# Patient Record
Sex: FEMALE | Race: BLACK OR AFRICAN AMERICAN | NOT HISPANIC OR LATINO | Employment: OTHER | URBAN - METROPOLITAN AREA
[De-identification: names, ages, dates, MRNs, and addresses within clinical notes are randomized per-mention and may not be internally consistent; named-entity substitution may affect disease eponyms.]

---

## 2018-10-26 ENCOUNTER — HOSPITAL ENCOUNTER (EMERGENCY)
Facility: HOSPITAL | Age: 33
Discharge: HOME | End: 2018-10-26
Attending: EMERGENCY MEDICINE | Admitting: EMERGENCY MEDICINE

## 2018-10-26 VITALS
BODY MASS INDEX: 36.7 KG/M2 | RESPIRATION RATE: 20 BRPM | TEMPERATURE: 98.4 F | SYSTOLIC BLOOD PRESSURE: 122 MMHG | WEIGHT: 215 LBS | OXYGEN SATURATION: 99 % | DIASTOLIC BLOOD PRESSURE: 68 MMHG | HEIGHT: 64 IN | HEART RATE: 103 BPM

## 2018-10-26 DIAGNOSIS — R22.0 MANDIBULAR SWELLING: ICD-10-CM

## 2018-10-26 DIAGNOSIS — K08.89 DENTALGIA: Primary | ICD-10-CM

## 2018-10-26 PROCEDURE — 99282 EMERGENCY DEPT VISIT SF MDM: CPT

## 2018-10-26 RX ORDER — CHLORHEXIDINE GLUCONATE 0.12 MG/ML
15 RINSE ORAL 2 TIMES DAILY
Qty: 120 ML | Refills: 0 | Status: SHIPPED | OUTPATIENT
Start: 2018-10-26

## 2018-10-26 RX ORDER — CLINDAMYCIN HYDROCHLORIDE 150 MG/1
450 CAPSULE ORAL EVERY 8 HOURS SCHEDULED
Qty: 63 CAPSULE | Refills: 0 | Status: SHIPPED | OUTPATIENT
Start: 2018-10-26 | End: 2018-11-02

## 2018-10-26 RX ORDER — LIDOCAINE HYDROCHLORIDE 10 MG/ML
5 INJECTION, SOLUTION EPIDURAL; INFILTRATION; INTRACAUDAL; PERINEURAL ONCE
Status: COMPLETED | OUTPATIENT
Start: 2018-10-26 | End: 2018-10-26

## 2018-10-26 RX ORDER — CLINDAMYCIN HYDROCHLORIDE 150 MG/1
450 CAPSULE ORAL ONCE
Status: COMPLETED | OUTPATIENT
Start: 2018-10-26 | End: 2018-10-26

## 2018-10-26 RX ADMIN — CLINDAMYCIN HYDROCHLORIDE 450 MG: 150 CAPSULE ORAL at 21:08

## 2018-10-26 RX ADMIN — LIDOCAINE HYDROCHLORIDE 5 ML: 10 INJECTION, SOLUTION EPIDURAL; INFILTRATION; INTRACAUDAL; PERINEURAL at 21:08

## 2018-10-27 NOTE — ED ATTENDING ATTESTATION
Roberta Castañeda MD, saw and evaluated the patient  I have discussed the patient with the resident/non-physician practitioner and agree with the resident's/non-physician practitioner's findings, Plan of Care, and MDM as documented in the resident's/non-physician practitioner's note, except where noted  All available labs and Radiology studies were reviewed  At this point I agree with the current assessment done in the Emergency Department  I have conducted an independent evaluation of this patient a history and physical is as follows:      Critical Care Time  CritCare Time    Procedures     34 yo female with left mandibular pain and drainage with hx of poor dentition and cracked crown  Pt with no trauma  Pt taking ibuprofen with no relief  No trouble swallowing or breathing, no fever  Vss, afebrile, lungs cta, rrr, left mandibular lower tooth tender, no trismus    Dentalgia, pain meds, block, abx, dental referral

## 2018-10-27 NOTE — ED PROVIDER NOTES
History  Chief Complaint   Patient presents with    Oral Swelling     pt reports pain and swelling to left lower gum area since last night  denies resp involvement  pt took 800mg motrin around 68pm     35year-old woman presents for evaluation of oral swelling  She describes a 2-day history of progressive left mandibular swelling and associated pain surrounding tooth #20  She has a known cracked crown and has not seen a dentist  No inciting trauma  No fevers, chills, dysphagia, or dyspnea  On arrival, patient is afebrile with a heart rate of 103 and otherwise normal vital signs  Physical exam shows gingival and soft-tissue edema in the area of tooth #20 without obvious abscess  No submental edema  Soft sublingual space  No trismus  Will provide dental block and first dose of antibiotics for suspected dental infection  Will prescribe Peridex, a course of antibiotics, and have the patient follow up with dentistry for further management  None       No past medical history on file  No past surgical history on file  No family history on file  I have reviewed and agree with the history as documented  Social History   Substance Use Topics    Smoking status: Not on file    Smokeless tobacco: Not on file    Alcohol use Not on file        Review of Systems   Constitutional: Negative for chills and fever  HENT: Positive for dental problem and facial swelling  Negative for rhinorrhea, sore throat and trouble swallowing  Eyes: Negative for photophobia and visual disturbance  Respiratory: Negative for cough and shortness of breath  Cardiovascular: Negative for chest pain and leg swelling  Gastrointestinal: Negative for abdominal pain, diarrhea, nausea and vomiting  Genitourinary: Negative for dysuria, frequency and hematuria  Musculoskeletal: Negative for back pain, neck pain and neck stiffness  Skin: Negative for rash and wound     Neurological: Negative for light-headedness and headaches  Physical Exam  ED Triage Vitals [10/26/18 2033]   Temperature Pulse Respirations Blood Pressure SpO2   98 4 °F (36 9 °C) 103 20 122/68 99 %      Temp Source Heart Rate Source Patient Position - Orthostatic VS BP Location FiO2 (%)   Oral Monitor Sitting Right arm --      Pain Score       8           Orthostatic Vital Signs  Vitals:    10/26/18 2033   BP: 122/68   Pulse: 103   Patient Position - Orthostatic VS: Sitting       Physical Exam   Constitutional: She is oriented to person, place, and time  She appears well-developed and well-nourished  No distress  HENT:   Head: Normocephalic and atraumatic    gingival and soft-tissue edema in the area of tooth #20 without obvious abscess  No submental edema  Soft sublingual space  No trismus   Eyes: Pupils are equal, round, and reactive to light  Conjunctivae are normal  No scleral icterus  Neck: Neck supple  No JVD present  No signs of meningismus   Cardiovascular: Normal rate, regular rhythm and normal heart sounds  Exam reveals no gallop and no friction rub  No murmur heard  Pulmonary/Chest: Effort normal and breath sounds normal  No respiratory distress  She has no wheezes  She has no rales  Abdominal: Soft  She exhibits no distension  There is no tenderness  There is no rebound and no guarding  Musculoskeletal: She exhibits no edema or tenderness  Neurological: She is alert and oriented to person, place, and time  Skin: Skin is warm and dry  She is not diaphoretic  Psychiatric: She has a normal mood and affect  Her behavior is normal  Thought content normal    Vitals reviewed        ED Medications  Medications   clindamycin (CLEOCIN) capsule 450 mg (450 mg Oral Given 10/26/18 2108)   lidocaine (PF) (XYLOCAINE-MPF) 1 % injection 5 mL (5 mL Infiltration Given by Other 10/26/18 2108)       Diagnostic Studies  Results Reviewed     None                 No orders to display         Procedures  Procedures      Phone Consults  ED Phone Contact    ED Course                               MDM  Number of Diagnoses or Management Options  Dentalgia:   Mandibular swelling:   Diagnosis management comments: No signs of systemic infection  No submental edema  Dental block was performed  Patient was given the first dose of clindamycin and discharged with Peridex, clindamycin, and outpatient dental follow up  CritCare Time    Disposition  Final diagnoses:   Dentalgia   Mandibular swelling     Time reflects when diagnosis was documented in both MDM as applicable and the Disposition within this note     Time User Action Codes Description Comment    10/26/2018  9:16 PM Charlette Lopez [K08 [id number]] Dentalgia     10/26/2018  9:16 PM Enma Lopez Add [[id number]] Mandibular swelling       ED Disposition     ED Disposition Condition Comment    Discharge  Marleny Odom discharge to home/self care  Condition at discharge: Good        Follow-up Information     Follow up With Specialties Details Why Contact Info Additional [address]  Schedule an appointment as soon as possible for a visit  [address]  [address] Emergency Department Emergency Medicine  As needed [address] [facility] ED, [address] [place], [place], [facility]          Discharge Medication List as of 10/26/2018  9:18 PM      START taking these medications    Details   chlorhexidine (PERIDEX) 0 12 % solution Apply 15 mL to the mouth or throat 2 (two) times a day, Starting Fri 10/26/2018, Print      clindamycin (CLEOCIN) 150 mg capsule Take 3 capsules (450 mg total) by mouth every 8 (eight) hours for 7 days, Starting Fri 10/26/2018, Until Fri 11/2/2018, Print           No discharge procedures on file  ED Provider  Attending physically available and evaluated Marleny Odom   I managed the patient along with the ED Attending      Electronically Signed by         Jesus Gooden MD  10/27/18 4690

## 2018-10-27 NOTE — DISCHARGE INSTRUCTIONS
Please follow up with our dental clinic, as soon as possible, for further management  You can take Tylenol and ibuprofen, as needed, for pain  Take the antibiotics and use the Peridex I prescribed you for a likely dental infection  Return to the ER for new or worrisome symptoms  Toothache   WHAT YOU NEED TO KNOW:   A toothache is pain that is caused by irritation of the nerves in the center of your tooth  The irritation may be caused by several problems, such as a cavity, an infection, a cracked tooth, or gum disease  It is very important to follow up with your dentist so the cause of your toothache can be diagnosed and treated  This can help prevent more serious problems  DISCHARGE INSTRUCTIONS:   Medicines: You may  need any of the following:  · NSAIDs  decrease swelling and pain  This medicine can be bought with or without a doctor's order  This medicine can cause stomach bleeding or kidney problems in certain people  If you take blood thinner medicine, always ask your healthcare provider if NSAIDs are safe for you  Always read the medicine label and follow the directions on it before using this medicine  · Acetaminophen  decreases pain  It is available without a doctor's order  Ask how much to take and how often to take it  Follow directions  Acetaminophen can cause liver damage if not taken correctly  · Pain medicine  may be given as a pill or as medicine that you put directly on your tooth or gums  Do not wait until the pain is severe before you take this medicine  · Antibiotics  help fight or prevent an infection caused by bacteria  Take them as directed  · Take your medicine as directed  Contact your healthcare provider if you think your medicine is not helping or if you have side effects  Tell him of her if you are allergic to any medicine  Keep a list of the medicines, vitamins, and herbs you take  Include the amounts, and when and why you take them   Bring the list or the pill bottles to follow-up visits  Carry your medicine list with you in case of an emergency  Follow up with your dentist as directed: You may be referred to a dental surgeon  Write down your questions so you remember to ask them during your visits  Self-care:   · Rinse your mouth with warm salt water 4 times a day or as directed  · You may need to eat soft foods to help relieve pain caused by chewing  Contact your dentist if:   · You have questions or concerns about your condition or care  Return to the emergency department if:   · You have trouble breathing  · You have swelling in your face or neck  · You have a fever and chills  · You have trouble speaking or swallowing  · You have trouble opening or closing your mouth  © 2017 [facility] Information is for End User's use only and may not be sold, redistributed or otherwise used for commercial purposes  All illustrations and images included in CareNotes® are the copyrighted property of [employer]  or Matthew Savage  The above information is an  only  It is not intended as medical advice for individual conditions or treatments  Talk to your doctor, nurse or pharmacist before following any medical regimen to see if it is safe and effective for you  Chlorhexidine (Into the mouth)   Chlorhexidine (klor-HEX-i-mundo)  Treats gingivitis and periodontitis (teeth and gum disease)  Brand Name(s): Periochip   There may be other brand names for this medicine  When This Medicine Should Not Be Used:   Do not use this medicine if you have had an allergic reaction to chlorhexidine  How to Use This Medicine:   Chip, Liquid  · Your dentist will tell you how much of this medicine to use and how often  Do not use more medicine or use it longer or more often than your dentist tells you to  · Chip: Your dentist will place the chip between your gums and teeth where the gum has a deep pocket   He will place the chip after your teeth have been thoroughly cleaned  Your dentist will check the depth of the pockets in your gums every 3 months to see if they need to be treated again  · Liquid: This medicine should be used after you have brushed and flossed your teeth  Measure out 1/2 fluid ounce (15 milliliters) as marked in the cap that comes with the bottle  Swish the solution in your mouth for at least 30 seconds and then spit it out  Do not swallow it  Do not mix it with water or other fluids  Do not eat or drink right after you use this medicine  If a dose is missed:   · Take a dose as soon as you remember  If it is almost time for your next dose, wait until then and take a regular dose  Do not take extra medicine to make up for a missed dose  How to Store and Dispose of This Medicine:   · Store the medicine in a closed container at room temperature, away from heat, moisture, and direct light  · Ask your pharmacist, doctor, or health caregiver about the best way to dispose of any outdated medicine or medicine no longer needed  · Keep all medicine out of the reach of children  Never share your medicine with anyone  Drugs and Foods to Avoid:      Ask your doctor or pharmacist before using any other medicine, including over-the-counter medicines, vitamins, and herbal products  Warnings While Using This Medicine:   · Make sure your dentist knows if you are pregnant or breastfeeding, or if you have cancer, diabetes, a weak immune system, or other gum problems (such as abscess or pus)  · This medicine may discolor your teeth a yellow-brown color  This is normal and can be removed with professional cleaning  · Check with your dentist right away if a dental chip becomes loose or falls out  Chlorhexidine implants are small, orange-brown rectangular chips that are rounded at one end  · Do not floss around the teeth and gums that have been treated for 10 days after the dental chips have been inserted   Flossing may push out the dental chips  · Mild sensitivity is normal for about 1 week after the chip was inserted  Check with your dentist right away if you feel pain or swelling where the chip was inserted  Possible Side Effects While Using This Medicine: If you notice these less serious side effects, talk with your doctor:  · Allergic reaction: Itching or hives, swelling in your face or hands, swelling or tingling in your mouth or throat, chest tightness, trouble breathing  · Bad taste in your mouth or altered sense of taste  · Stained teeth, tooth fillings, and dentures  · Toothache  If you notice other side effects that you think are caused by this medicine, tell your doctor  Call your doctor for medical advice about side effects  You may report side effects to FDA at 2-860-FDA-3909  © 2017 [address] [employer] Information is for End User's use only and may not be sold, redistributed or otherwise used for commercial purposes  The above information is an  only  It is not intended as medical advice for individual conditions or treatments  Talk to your doctor, nurse or pharmacist before following any medical regimen to see if it is safe and effective for you  Clindamycin (By mouth)   Clindamycin (kaqd-fx-BUI-sin)  Treats infections  Brand Name(s): Cleocin, Cleocin HCl, Cleocin Pediatric   There may be other brand names for this medicine  When This Medicine Should Not Be Used: This medicine is not right for everyone  Do not use it if you had an allergic reaction to clindamycin or lincomycin  How to Use This Medicine:   Capsule, Liquid  · Your doctor will tell you how much medicine to use  Do not use more than directed  · Capsule: Swallow with a full glass of water  · Oral liquid: Measure the oral liquid medicine with a marked measuring spoon, oral syringe, or medicine cup    · Take all of the medicine in your prescription to clear up your infection, even if you feel better after the first few doses   · Missed dose: Take a dose as soon as you remember  If it is almost time for your next dose, wait until then and take a regular dose  Do not take extra medicine to make up for a missed dose  · Store the medicine in a closed container at room temperature, away from heat, moisture, and direct light  Oral liquid: Do not refrigerate or freeze  Throw away any unused medicine after 14 days  Drugs and Foods to Avoid:   Ask your doctor or pharmacist before using any other medicine, including over-the-counter medicines, vitamins, and herbal products  · Some medicines can affect how clindamycin works  Tell your doctor if you are using erythromycin  Warnings While Using This Medicine:   · Tell your doctor if you are pregnant or breastfeeding, or if you have kidney disease, liver disease, allergies (including an allergy to aspirin), asthma, or stomach or bowel problems (including colitis)  · This medicine may cause severe skin reactions  · This medicine can cause diarrhea  Call your doctor if the diarrhea becomes severe, does not stop, or is bloody  Do not take any medicine to stop diarrhea until you have talked to your doctor  Diarrhea can occur 2 months or more after you stop taking this medicine  · Keep all medicine out of the reach of children  Never share your medicine with anyone    Possible Side Effects While Using This Medicine:   Call your doctor right away if you notice any of these side effects:  · Allergic reaction: Itching or hives, swelling in your face or hands, swelling or tingling in your mouth or throat, chest tightness, trouble breathing  · Blistering, peeling, red skin rash  · Fever, chills, cough, sore throat, body aches  · Severe diarrhea that does not go away, stomach cramps  · Unusual bleeding, bruising, or weakness  If you notice these less serious side effects, talk with your doctor:   · Mild diarrhea, nausea  If you notice other side effects that you think are caused by this medicine, tell your doctor  Call your doctor for medical advice about side effects  You may report side effects to FDA at 4-836-FDA-1034  © 2017 [address] [employer] Information is for End User's use only and may not be sold, redistributed or otherwise used for commercial purposes  The above information is an  only  It is not intended as medical advice for individual conditions or treatments  Talk to your doctor, nurse or pharmacist before following any medical regimen to see if it is safe and effective for you

## 2021-01-12 ENCOUNTER — TELEPHONE (OUTPATIENT)
Dept: FAMILY MEDICINE CLINIC | Facility: CLINIC | Age: 36
End: 2021-01-12

## 2024-06-28 ENCOUNTER — HOSPITAL ENCOUNTER (INPATIENT)
Facility: HOSPITAL | Age: 39
LOS: 3 days | Discharge: HOME | DRG: 812 | End: 2024-07-01
Attending: EMERGENCY MEDICINE | Admitting: INTERNAL MEDICINE
Payer: COMMERCIAL

## 2024-06-28 DIAGNOSIS — D64.9 ANEMIA: Primary | ICD-10-CM

## 2024-06-28 DIAGNOSIS — D72.819 LEUKOPENIA: ICD-10-CM

## 2024-06-28 LAB
ABO GROUP BLD: NORMAL
ABO GROUP BLD: NORMAL
ALBUMIN SERPL BCG-MCNC: 4 G/DL (ref 3.5–5)
ALP SERPL-CCNC: 30 U/L (ref 34–104)
ALT SERPL W P-5'-P-CCNC: 5 U/L (ref 7–52)
ANION GAP SERPL CALCULATED.3IONS-SCNC: 6 MMOL/L (ref 4–13)
ANISOCYTOSIS BLD QL SMEAR: PRESENT
AST SERPL W P-5'-P-CCNC: 12 U/L (ref 13–39)
BACTERIA UR QL AUTO: ABNORMAL /HPF
BASOPHILS # BLD AUTO: 0.06 THOUSANDS/ÂΜL (ref 0–0.1)
BASOPHILS # BLD AUTO: 0.25 THOUSAND/UL (ref 0–0.1)
BASOPHILS NFR BLD AUTO: 2 % (ref 0–1)
BASOPHILS NFR MAR MANUAL: 6 % (ref 0–1)
BILIRUB DIRECT SERPL-MCNC: 0.08 MG/DL (ref 0–0.2)
BILIRUB SERPL-MCNC: 0.31 MG/DL (ref 0.2–1)
BILIRUB UR QL STRIP: NEGATIVE
BLD GP AB SCN SERPL QL: NEGATIVE
BUN SERPL-MCNC: 8 MG/DL (ref 5–25)
CALCIUM SERPL-MCNC: 8.5 MG/DL (ref 8.4–10.2)
CHLORIDE SERPL-SCNC: 107 MMOL/L (ref 96–108)
CLARITY UR: CLEAR
CO2 SERPL-SCNC: 26 MMOL/L (ref 21–32)
COLOR UR: ABNORMAL
CREAT SERPL-MCNC: 0.49 MG/DL (ref 0.6–1.3)
DACRYOCYTES BLD QL SMEAR: PRESENT
DACRYOCYTES BLD QL SMEAR: PRESENT
EOSINOPHIL # BLD AUTO: 0.04 THOUSAND/ÂΜL (ref 0–0.61)
EOSINOPHIL NFR BLD AUTO: 1 % (ref 0–6)
ERYTHROCYTE [DISTWIDTH] IN BLOOD BY AUTOMATED COUNT: 27.8 % (ref 11.6–15.1)
FERRITIN SERPL-MCNC: 2 NG/ML (ref 11–307)
FOLATE SERPL-MCNC: 7.2 NG/ML
GFR SERPL CREATININE-BSD FRML MDRD: 123 ML/MIN/1.73SQ M
GLUCOSE SERPL-MCNC: 76 MG/DL (ref 65–140)
GLUCOSE UR STRIP-MCNC: NEGATIVE MG/DL
HCT VFR BLD AUTO: 14.6 % (ref 34.8–46.1)
HCT VFR BLD AUTO: 16.3 % (ref 34.8–46.1)
HELMET CELLS BLD QL SMEAR: PRESENT
HELMET CELLS BLD QL SMEAR: PRESENT
HGB BLD-MCNC: 3.7 G/DL (ref 11.5–15.4)
HGB BLD-MCNC: 3.9 G/DL (ref 11.5–15.4)
HGB UR QL STRIP.AUTO: ABNORMAL
HYPERCHROMIA BLD QL SMEAR: PRESENT
HYPERCHROMIA BLD QL SMEAR: PRESENT
IMM GRANULOCYTES # BLD AUTO: 0.04 THOUSAND/UL (ref 0–0.2)
IMM GRANULOCYTES NFR BLD AUTO: 1 % (ref 0–2)
IRON SATN MFR SERPL: 2 % (ref 15–50)
IRON SERPL-MCNC: 11 UG/DL (ref 50–212)
KETONES UR STRIP-MCNC: NEGATIVE MG/DL
LDH SERPL-CCNC: 186 U/L (ref 140–271)
LEUKOCYTE ESTERASE UR QL STRIP: NEGATIVE
LG PLATELETS BLD QL SMEAR: PRESENT
LG PLATELETS BLD QL SMEAR: PRESENT
LYMPHOCYTES # BLD AUTO: 1.41 THOUSANDS/ÂΜL (ref 0.6–4.47)
LYMPHOCYTES # BLD AUTO: 1.44 THOUSAND/UL (ref 0.6–4.47)
LYMPHOCYTES # BLD AUTO: 32 %
LYMPHOCYTES NFR BLD AUTO: 36 % (ref 14–44)
MCH RBC QN AUTO: 13.9 PG (ref 26.8–34.3)
MCHC RBC AUTO-ENTMCNC: 23.9 G/DL (ref 31.4–37.4)
MCV RBC AUTO: 58 FL (ref 82–98)
MICROCYTES BLD QL AUTO: PRESENT
MICROCYTES BLD QL AUTO: PRESENT
MONOCYTES # BLD AUTO: 0.08 THOUSAND/UL (ref 0–1.22)
MONOCYTES # BLD AUTO: 0.33 THOUSAND/ÂΜL (ref 0.17–1.22)
MONOCYTES NFR BLD AUTO: 2 % (ref 4–12)
MONOCYTES NFR BLD AUTO: 8 % (ref 4–12)
MUCOUS THREADS UR QL AUTO: ABNORMAL
NEUTROPHILS # BLD AUTO: 2.07 THOUSANDS/ÂΜL (ref 1.85–7.62)
NEUTS SEG # BLD: 2.34 THOUSAND/UL (ref 1.81–6.82)
NEUTS SEG NFR BLD AUTO: 52 % (ref 43–75)
NEUTS SEG NFR BLD AUTO: 57 %
NITRITE UR QL STRIP: NEGATIVE
NON-SQ EPI CELLS URNS QL MICRO: ABNORMAL /HPF
NRBC BLD AUTO-RTO: 0 /100 WBCS
OVALOCYTES BLD QL SMEAR: PRESENT
PH UR STRIP.AUTO: 7 [PH]
PLATELET # BLD AUTO: 372 THOUSANDS/UL (ref 149–390)
PLATELET BLD QL SMEAR: ABNORMAL
PLATELET BLD QL SMEAR: ADEQUATE
PLATELET CLUMP BLD QL SMEAR: PRESENT
PLATELET CLUMP BLD QL SMEAR: PRESENT
POIKILOCYTOSIS BLD QL SMEAR: PRESENT
POIKILOCYTOSIS BLD QL SMEAR: PRESENT
POLYCHROMASIA BLD QL SMEAR: PRESENT
POTASSIUM SERPL-SCNC: 3.6 MMOL/L (ref 3.5–5.3)
PROT SERPL-MCNC: 6.6 G/DL (ref 6.4–8.4)
PROT UR STRIP-MCNC: ABNORMAL MG/DL
RBC # BLD AUTO: 2.81 MILLION/UL (ref 3.81–5.12)
RBC #/AREA URNS AUTO: ABNORMAL /HPF
RBC MORPH BLD: PRESENT
RBC MORPH BLD: PRESENT
RH BLD: POSITIVE
RH BLD: POSITIVE
SODIUM SERPL-SCNC: 139 MMOL/L (ref 135–147)
SP GR UR STRIP.AUTO: 1.01 (ref 1–1.03)
SPECIMEN EXPIRATION DATE: NORMAL
TARGETS BLD QL SMEAR: PRESENT
TARGETS BLD QL SMEAR: PRESENT
TIBC SERPL-MCNC: 691 UG/DL (ref 250–450)
TOTAL CELLS COUNTED SPEC: 100
UIBC SERPL-MCNC: 680 UG/DL (ref 155–355)
UROBILINOGEN UR STRIP-ACNC: <2 MG/DL
VARIANT LYMPHS # BLD AUTO: 3 % (ref 0–0)
VIT B12 SERPL-MCNC: 207 PG/ML (ref 180–914)
WBC # BLD AUTO: 3.95 THOUSAND/UL (ref 4.31–10.16)
WBC #/AREA URNS AUTO: ABNORMAL /HPF

## 2024-06-28 PROCEDURE — 86900 BLOOD TYPING SEROLOGIC ABO: CPT

## 2024-06-28 PROCEDURE — 30233N1 TRANSFUSION OF NONAUTOLOGOUS RED BLOOD CELLS INTO PERIPHERAL VEIN, PERCUTANEOUS APPROACH: ICD-10-PCS | Performed by: HOSPITALIST

## 2024-06-28 PROCEDURE — 99291 CRITICAL CARE FIRST HOUR: CPT | Performed by: EMERGENCY MEDICINE

## 2024-06-28 PROCEDURE — 82728 ASSAY OF FERRITIN: CPT | Performed by: INTERNAL MEDICINE

## 2024-06-28 PROCEDURE — 85018 HEMOGLOBIN: CPT

## 2024-06-28 PROCEDURE — 86901 BLOOD TYPING SEROLOGIC RH(D): CPT

## 2024-06-28 PROCEDURE — 85007 BL SMEAR W/DIFF WBC COUNT: CPT | Performed by: INTERNAL MEDICINE

## 2024-06-28 PROCEDURE — 86850 RBC ANTIBODY SCREEN: CPT

## 2024-06-28 PROCEDURE — 36430 TRANSFUSION BLD/BLD COMPNT: CPT

## 2024-06-28 PROCEDURE — 83540 ASSAY OF IRON: CPT | Performed by: INTERNAL MEDICINE

## 2024-06-28 PROCEDURE — 86923 COMPATIBILITY TEST ELECTRIC: CPT

## 2024-06-28 PROCEDURE — 85025 COMPLETE CBC W/AUTO DIFF WBC: CPT

## 2024-06-28 PROCEDURE — 80048 BASIC METABOLIC PNL TOTAL CA: CPT

## 2024-06-28 PROCEDURE — 83550 IRON BINDING TEST: CPT | Performed by: INTERNAL MEDICINE

## 2024-06-28 PROCEDURE — 83615 LACTATE (LD) (LDH) ENZYME: CPT | Performed by: INTERNAL MEDICINE

## 2024-06-28 PROCEDURE — 82607 VITAMIN B-12: CPT | Performed by: INTERNAL MEDICINE

## 2024-06-28 PROCEDURE — 80076 HEPATIC FUNCTION PANEL: CPT | Performed by: INTERNAL MEDICINE

## 2024-06-28 PROCEDURE — 81001 URINALYSIS AUTO W/SCOPE: CPT | Performed by: INTERNAL MEDICINE

## 2024-06-28 PROCEDURE — 82746 ASSAY OF FOLIC ACID SERUM: CPT | Performed by: INTERNAL MEDICINE

## 2024-06-28 PROCEDURE — P9016 RBC LEUKOCYTES REDUCED: HCPCS

## 2024-06-28 PROCEDURE — 99283 EMERGENCY DEPT VISIT LOW MDM: CPT

## 2024-06-28 PROCEDURE — 85014 HEMATOCRIT: CPT

## 2024-06-28 PROCEDURE — 99222 1ST HOSP IP/OBS MODERATE 55: CPT | Performed by: INTERNAL MEDICINE

## 2024-06-28 PROCEDURE — 36415 COLL VENOUS BLD VENIPUNCTURE: CPT

## 2024-06-28 RX ORDER — FOLIC ACID 1 MG/1
1 TABLET ORAL DAILY
Status: DISCONTINUED | OUTPATIENT
Start: 2024-06-29 | End: 2024-07-01 | Stop reason: HOSPADM

## 2024-06-28 RX ORDER — CYANOCOBALAMIN 1000 UG/ML
1000 INJECTION, SOLUTION INTRAMUSCULAR; SUBCUTANEOUS DAILY
Status: DISCONTINUED | OUTPATIENT
Start: 2024-06-29 | End: 2024-07-01 | Stop reason: HOSPADM

## 2024-06-28 NOTE — ED PROVIDER NOTES
"History  Chief Complaint   Patient presents with    Abnormal Lab     Pt states she was called to come in for low RBCs.  Pt states she had it done yesterday at the clinic     Patient is a 39-year-old female no past medical history presenting to the ED for evaluation of abnormal outpatient lab work.  Reports that she had blood work done as part of her process for getting her 's license.  She was called today and told to go to the hospital because \"my RBCs were low\".  She denies any exertional dyspnea chest pain lightheadedness syncope heavy vaginal bleeding or bleeding from other sources no abdominal pain nausea vomiting or any other complaints.          Prior to Admission Medications   Prescriptions Last Dose Informant Patient Reported? Taking?   chlorhexidine (PERIDEX) 0.12 % solution   No No   Sig: Apply 15 mL to the mouth or throat 2 (two) times a day   fexofenadine-pseudoephedrine (ALLEGRA-D 24) 180-240 MG per 24 hr tablet   Yes No   Sig: Take 1 tablet by mouth daily      Facility-Administered Medications: None       History reviewed. No pertinent past medical history.    History reviewed. No pertinent surgical history.    History reviewed. No pertinent family history.  I have reviewed and agree with the history as documented.    E-Cigarette/Vaping    E-Cigarette Use Never User      E-Cigarette/Vaping Substances     Social History     Tobacco Use    Smoking status: Never    Smokeless tobacco: Never   Vaping Use    Vaping status: Never Used   Substance Use Topics    Alcohol use: Yes     Comment: occasional    Drug use: Never        Review of Systems   Constitutional:  Negative for chills and fever.   HENT:  Negative for ear pain and sore throat.    Eyes:  Negative for pain and visual disturbance.   Respiratory:  Negative for cough and shortness of breath.    Cardiovascular:  Negative for chest pain and palpitations.   Gastrointestinal:  Negative for abdominal pain and vomiting.   Genitourinary:  Negative for " dysuria and hematuria.   Musculoskeletal:  Negative for arthralgias and back pain.   Skin:  Negative for color change and rash.   Neurological:  Negative for seizures and syncope.   All other systems reviewed and are negative.      Physical Exam  ED Triage Vitals [06/28/24 1120]   Temperature Pulse Respirations Blood Pressure SpO2   98 °F (36.7 °C) 77 18 162/70 99 %      Temp Source Heart Rate Source Patient Position - Orthostatic VS BP Location FiO2 (%)   Tympanic Monitor Sitting Right arm --      Pain Score       No Pain             Orthostatic Vital Signs  Vitals:    06/28/24 1120 06/28/24 1230 06/28/24 1430   BP: 162/70 146/67 157/67   Pulse: 77 72 74   Patient Position - Orthostatic VS: Sitting Lying        Physical Exam  Vitals and nursing note reviewed.   Constitutional:       General: She is not in acute distress.     Appearance: She is well-developed. She is not ill-appearing or diaphoretic.   HENT:      Head: Normocephalic and atraumatic.      Mouth/Throat:      Mouth: Mucous membranes are moist.   Eyes:      Extraocular Movements: Extraocular movements intact.      Conjunctiva/sclera: Conjunctivae normal.      Comments: Pale sclera   Cardiovascular:      Rate and Rhythm: Normal rate and regular rhythm.      Heart sounds: Murmur heard.      Gallop present.   Pulmonary:      Effort: Pulmonary effort is normal. No respiratory distress.      Breath sounds: Normal breath sounds.   Abdominal:      Palpations: Abdomen is soft.      Tenderness: There is no abdominal tenderness.   Musculoskeletal:         General: Normal range of motion.      Cervical back: Neck supple.   Skin:     General: Skin is warm and dry.      Coloration: Skin is pale (palms, nails, oral mucosa).   Neurological:      General: No focal deficit present.      Mental Status: She is alert.         ED Medications  Medications - No data to display    Diagnostic Studies  Results Reviewed       Procedure Component Value Units Date/Time    Hemoglobin  and hematocrit, blood [[id number]]  (Abnormal) Collected: 06/28/24 1459    Lab Status: Final result Specimen: Blood from Arm, Left Updated: 06/28/24 1541     Hemoglobin 3.7 g/dL      Hematocrit 14.6 %     CBC and differential [[id number]]  (Abnormal) Collected: 06/28/24 1339    Lab Status: Final result Specimen: Blood from Arm, Left Updated: 06/28/24 1446     WBC 3.95 Thousand/uL      RBC 2.81 Million/uL      Hemoglobin 3.9 g/dL      Hematocrit 16.3 %      MCV 58 fL      MCH 13.9 pg      MCHC 23.9 g/dL      RDW 27.8 %      Platelets 372 Thousands/uL      nRBC 0 /100 WBCs      Segmented % 52 %      Immature Grans % 1 %      Lymphocytes % 36 %      Monocytes % 8 %      Eosinophils Relative 1 %      Basophils Relative 2 %      Absolute Neutrophils 2.07 Thousands/µL      Absolute Immature Grans 0.04 Thousand/uL      Absolute Lymphocytes 1.41 Thousands/µL      Absolute Monocytes 0.33 Thousand/µL      Eosinophils Absolute 0.04 Thousand/µL      Basophils Absolute 0.06 Thousands/µL     Narrative:      This is an appended report.  These results have been appended to a previously verified report.    Smear Review(Phlebs Do Not Order) [[id number]]  (Abnormal) Collected: 06/28/24 1339    Lab Status: Final result Specimen: Blood from Arm, Left Updated: 06/28/24 1446     RBC Morphology Present     Platelet Estimate Unable to Estimate due to clumped platelets     Clumped Platelets Present     Large Platelet Present     Anisocytosis Present     Helmet Cells Present     Hypochromia Present     Microcytes Present     Poikilocytes Present     Target Cells Present     Tear Drop Cells Present    Basic metabolic panel [[id number]]  (Abnormal) Collected: 06/28/24 1339    Lab Status: Final result Specimen: Blood from Arm, Left Updated: 06/28/24 1413     Sodium 139 mmol/L      Potassium 3.6 mmol/L      Chloride 107 mmol/L      CO2 26 mmol/L      ANION GAP 6 mmol/L      BUN 8 mg/dL      Creatinine 0.49 mg/dL      Glucose 76 mg/dL      Calcium 8.5  mg/dL      eGFR 123 ml/min/1.73sq m     Narrative:      National [employer] guidelines for Chronic Kidney Disease (CKD):     Stage 1 with normal or high GFR (GFR > 90 mL/min/1.73 square meters)    Stage 2 Mild CKD (GFR = 60-89 mL/min/1.73 square meters)    Stage 3A Moderate CKD (GFR = 45-59 mL/min/1.73 square meters)    Stage 3B Moderate CKD (GFR = 30-44 mL/min/1.73 square meters)    Stage 4 Severe CKD (GFR = 15-29 mL/min/1.73 square meters)    Stage 5 End Stage CKD (GFR <15 mL/min/1.73 square meters)  Note: GFR calculation is accurate only with a steady state creatinine                   No orders to display         Procedures  Procedures      ED Course  ED Course as of 06/28/24 1605   Fri Jun 28, 2024   1455 Hemoglobin(!!): 3.9  Will send repeat h&h to confirm validity as pt is completely asymptomatic                              SBIRT 22yo+      Flowsheet Row Most Recent Value   Initial Alcohol Screen: US AUDIT-C     1. How often do you have a drink containing alcohol? 0 Filed at: 06/28/2024 1122   2. How many drinks containing alcohol do you have on a typical day you are drinking?  0 Filed at: 06/28/2024 1122   3a. Male UNDER 65: How often do you have five or more drinks on one occasion? 0 Filed at: 06/28/2024 1122   3b. FEMALE Any Age, or MALE 65+: How often do you have 4 or more drinks on one occassion? 0 Filed at: 06/28/2024 1122   Audit-C Score 0 Filed at: 06/28/2024 1122   ELVER: How many times in the past year have you...    Used an illegal drug or used a prescription medication for non-medical reasons? Never Filed at: 06/28/2024 1122                  Medical Decision Making  Patient is a 39-year-old female presenting for evaluation of abnormal outpatient lab work    Told low RBCs likely meant hemoglobin.  Will check labs, transfuse as needed    Initial hemoglobin 3.9, verified by repeat H&H 3.7.  There is also leukopenia.  Platelets normal.  Will need to be admitted for further workup as to  etiology.  In the meantime consent obtained for transfusion will give 2 units PRBC    Discussed with medicine who accepts patient for admission.  Patient hemodynamically stable and asymptomatic at the time of admission    Amount and/or Complexity of Data Reviewed  Labs: ordered. Decision-making details documented in ED Course.    Risk  Decision regarding hospitalization.          Disposition  Final diagnoses:   Anemia   Leukopenia     Time reflects when diagnosis was documented in both MDM as applicable and the Disposition within this note       Time User Action Codes Description Comment    6/28/2024  4:05 PM Naeem Lopez [D64.9] Anemia     6/28/2024  4:05 PM Naeem Lopez [D72.819] Leukopenia           ED Disposition       ED Disposition   Admit    Condition   Stable    Date/Time   Fri Jun 28, 2024 1605    Comment                  Follow-up Information    None         Patient's Medications   Discharge Prescriptions    No medications on file     No discharge procedures on file.    PDMP Review       None             ED Provider  Attending physically available and evaluated Tatiana MODE Quesadaon. I managed the patient along with the ED Attending.    Electronically Signed by           Naeem Lopez DO  06/29/24 0714

## 2024-06-28 NOTE — H&P
[facility]  H&P  Name: Tatiana Lopez 39 y.o. female I MRN: [id number]  Unit/Bed#: ED 08 I Date of Admission: 6/28/2024   Date of Service: 6/28/2024 I Hospital Day: 0    Iron studies consistent with severe iron deficiency anemia.  Also notable for B12 deficiency.  Will start IV Venofer and B12 supplements     Assessment & Plan   * Anemia  Assessment & Plan  Reports having routine outpatient blood work and was sent for low hemoglobin level  Hemoglobin presentation 3.7.  No prior history of anemia.  She does report intermittent heavy menstrual periods.  Last menstrual period started last Friday and ended today.  Denies hematuria, hematemesis, rectal bleeding  Check iron studies, folic acid, B12,, T bili, LDH and haptoglobin  Will be transfused 2 RBC units they will recheck afterward  Given concomitant leukopenia.  Will ask for hematology evaluation.  Check peripheral smear  Check occult stool           VTE Pharmacologic Prophylaxis:     Code Status: Level 1 - Full Code   Discussion with family: Patient declined call to .     Anticipated Length of Stay: Patient will be admitted on an inpatient basis with an anticipated length of stay of greater than 2 midnights secondary to above.    Total Time Spent on Date of Encounter in care of patient: 45 mins. This time was spent on one or more of the following: performing physical exam; counseling and coordination of care; obtaining or reviewing history; documenting in the medical record; reviewing/ordering tests, medications or procedures; communicating with other healthcare professionals and discussing with patient's family/caregivers.    Chief Complaint: low Hgb    History of Present Illness:  Tatiana Lopez is a 39 y.o. female with no significant past medical history.  She reports history of learning stability.  She had outpatient routine blood work which showed low hemoglobin level so she was sent to ED for further  evaluation.  Hemoglobin presentation 3.7 with associated mild leukopenia.  Patient reports intermittent episodes of heavy menstrual periods with last episode started last Friday and ending today.  Otherwise denies any other signs of bleeding.  She has no complaints today.  She is being transfused 2 units of blood and admitted for further evaluation.    Review of Systems:  Review of Systems   Constitutional:  Negative for chills and fever.   HENT:  Negative for ear pain and sore throat.    Eyes:  Negative for pain and visual disturbance.   Respiratory:  Negative for cough and shortness of breath.    Cardiovascular:  Negative for chest pain and palpitations.   Gastrointestinal:  Negative for abdominal pain and vomiting.   Genitourinary:  Negative for dysuria and hematuria.   Musculoskeletal:  Negative for arthralgias and back pain.   Skin:  Negative for color change and rash.   Neurological:  Negative for seizures and syncope.   All other systems reviewed and are negative.      Past Medical and Surgical History:   History reviewed. No pertinent past medical history.    History reviewed. No pertinent surgical history.    Meds/Allergies:  Prior to Admission medications    Medication Sig Start Date End Date Taking? Authorizing Provider   chlorhexidine (PERIDEX) 0.12 % solution Apply 15 mL to the mouth or throat 2 (two) times a day 10/26/18   Ibrahima Lopez MD   fexofenadine-pseudoephedrine (ALLEGRA-D 24) 180-240 MG per 24 hr tablet Take 1 tablet by mouth daily    Historical Provider, MD     I have reviewed home medications with patient personally.    Allergies:   Allergies   Allergen Reactions    Ceclor [Cefaclor]        Social History:  Marital Status: Single     Social History     Substance and Sexual Activity   Alcohol Use Yes    Comment: occasional     Social History     Tobacco Use   Smoking Status Never   Smokeless Tobacco Never     Social History     Substance and Sexual Activity   Drug Use Never  "      Family History:  History reviewed. No pertinent family history.    Physical Exam:     Vitals:   Blood Pressure: 163/71 (06/28/24 1657)  Pulse: 80 (06/28/24 1657)  Temperature: 98.9 °F (37.2 °C) (06/28/24 1657)  Temp Source: Tympanic (06/28/24 1657)  Respirations: 18 (06/28/24 1657)  SpO2: 99 % (06/28/24 1657)    Physical Exam  Vitals and nursing note reviewed.   Constitutional:       General: She is not in acute distress.     Appearance: She is well-developed.   HENT:      Head: Normocephalic and atraumatic.   Eyes:      Conjunctiva/sclera: Conjunctivae normal.   Cardiovascular:      Rate and Rhythm: Normal rate and regular rhythm.      Heart sounds: No murmur heard.  Pulmonary:      Effort: Pulmonary effort is normal. No respiratory distress.      Breath sounds: Normal breath sounds.   Abdominal:      Palpations: Abdomen is soft.      Tenderness: There is no abdominal tenderness.   Musculoskeletal:         General: No swelling.      Cervical back: Neck supple.   Skin:     General: Skin is warm and dry.      Capillary Refill: Capillary refill takes less than 2 seconds.   Neurological:      Mental Status: She is alert.   Psychiatric:         Mood and Affect: Mood normal.          Additional Data:     Lab Results:  Results from last 7 days   Lab Units 06/28/24  1459 06/28/24  1339   WBC Thousand/uL  --  3.95*   HEMOGLOBIN g/dL 3.7* 3.9*   HEMATOCRIT % 14.6* 16.3*   PLATELETS Thousands/uL  --  372   SEGS PCT %  --  52   LYMPHO PCT % 32 36   MONO PCT % 2* 8   EOS PCT %  --  1     Results from last 7 days   Lab Units 06/28/24  1339   SODIUM mmol/L 139   POTASSIUM mmol/L 3.6   CHLORIDE mmol/L 107   CO2 mmol/L 26   BUN mg/dL 8   CREATININE mg/dL 0.49*   ANION GAP mmol/L 6   CALCIUM mg/dL 8.5   ALBUMIN g/dL 4.0   TOTAL BILIRUBIN mg/dL 0.31   ALK PHOS U/L 30*   ALT U/L 5*   AST U/L 12*   GLUCOSE RANDOM mg/dL 76             No results found for: \"HGBA1C\"        Lines/Drains:  Invasive Devices       Peripheral " Intravenous Line  Duration             Peripheral IV 06/28/24 Distal;Left;Ventral (anterior) Forearm <1 day                        Imaging: Reviewed radiology reports from this admission including: xray(s)  No orders to display       EKG and Other Studies Reviewed on Admission:   EKG: Personally Reviewed.    ** Please Note: This note has been constructed using a voice recognition system. **

## 2024-06-28 NOTE — ASSESSMENT & PLAN NOTE
Reports having routine outpatient blood work and was sent for low hemoglobin level  Hemoglobin presentation 3.7.  No prior history of anemia.  She does report intermittent heavy menstrual periods.  Last menstrual period started last Friday and ended today.  Denies hematuria, hematemesis, rectal bleeding  Check iron studies, folic acid, B12,, T bili, LDH and haptoglobin  Will be transfused 2 RBC units they will recheck afterward  Given concomitant leukopenia.  Will ask for hematology evaluation.  Check peripheral smear

## 2024-06-28 NOTE — ED PROCEDURE NOTE
PROCEDURE  CriticalCare Time    Date/Time: 6/28/2024 3:45 PM    Performed by: Mitchell Rivas DO  Authorized by: Mitchell Rivas DO    Critical care provider statement:     Critical care time (minutes):  60    Critical care time was exclusive of:  Separately billable procedures and treating other patients and teaching time    Critical care was time spent personally by me on the following activities:  Blood draw for specimens, obtaining history from patient or surrogate, development of treatment plan with patient or surrogate, evaluation of patient's response to treatment, examination of patient, ordering and performing treatments and interventions, ordering and review of laboratory studies, ordering and review of radiographic studies, re-evaluation of patient's condition and review of old charts  Comments:      Upon my evaluation, this patient has a high probability of imminent or life-threatening deterioration due to anemia requiring blood transfusion  which required my direct attention, intervention, and personal management.     I have personally provided 60 minutes of critical care time, exclusive of procedures, teaching, and any prior time recorded by providers other than myself. Time includes review of laboratory data, radiology results, discussion with consultants, and monitoring for potential decompensation.            Mitchell Rivas DO  06/28/24 1546

## 2024-06-28 NOTE — ED ATTENDING ATTESTATION
6/28/2024  I, Mitchell Rivas DO, saw and evaluated the patient. I have discussed the patient with the resident/non-physician practitioner and agree with the resident's/non-physician practitioner's findings, Plan of Care, and MDM as documented in the resident's/non-physician practitioner's note, except where noted. All available labs and Radiology studies were reviewed.  I was present for key portions of any procedure(s) performed by the resident/non-physician practitioner and I was immediately available to provide assistance.       At this point I agree with the current assessment done in the Emergency Department.  I have conducted an independent evaluation of this patient a history and physical is as follows:    39F with routine blood work, low rbcs noted. No symptoms noted. Unknown lab value on chart review.     ROS: per resident physician note    Gen: NAD, AA&Ox3  HEENT: PERRL, EOMI  Neck: supple  CV: RRR, murmur noted, systolic.   Lungs: CTA B/L  Abdomen: soft, NT/ND  Ext: no swelling or deformity  Neuro: 5/5 strength all extremities, sensation grossly intact  Skin: no rash    Ddx- anemia, lab error,     Will check cbc.     ED Course  ED Course as of 06/28/24 1546   Fri Jun 28, 2024   1543 Hemoglobin(!!): 3.7   1543 Hemoglobin(!!): 3.9  Hemoglobin with severe anemia noted.  Will transfuse and admit.         Critical Care Time  Procedures

## 2024-06-28 NOTE — ASSESSMENT & PLAN NOTE
Reports having routine outpatient blood work and was sent for low hemoglobin level  Hemoglobin presentation 3.7.  No prior history of anemia.  She does report intermittent heavy menstrual periods.  Last menstrual period started last Friday and ended this morning, denies passing any clots.  Denies hematuria, hematemesis, rectal bleeding.  Iron studies remarkable for iron deficiency anemia.  Total bili and LDH within normal limit, haptoglobin pending but low suspicious for any underlying hemolysis.  Patient is s/p 3 unit transfusion of PRBC with improvement in hemoglobin to 6.4 this morning, will order 1 more dose of PRBC.  She will also be started on IV iron on Monday.   Hematology consulted due to concurrent leukopenia.  Patient will need referral for GYN on discharge.

## 2024-06-29 LAB
ABO GROUP BLD BPU: NORMAL
ANION GAP SERPL CALCULATED.3IONS-SCNC: 8 MMOL/L (ref 4–13)
BPU ID: NORMAL
BUN SERPL-MCNC: 8 MG/DL (ref 5–25)
CALCIUM SERPL-MCNC: 8.5 MG/DL (ref 8.4–10.2)
CHLORIDE SERPL-SCNC: 107 MMOL/L (ref 96–108)
CO2 SERPL-SCNC: 25 MMOL/L (ref 21–32)
CREAT SERPL-MCNC: 0.47 MG/DL (ref 0.6–1.3)
CROSSMATCH: NORMAL
ERYTHROCYTE [DISTWIDTH] IN BLOOD BY AUTOMATED COUNT: 30.9 % (ref 11.6–15.1)
GFR SERPL CREATININE-BSD FRML MDRD: 124 ML/MIN/1.73SQ M
GLUCOSE SERPL-MCNC: 68 MG/DL (ref 65–140)
HCT VFR BLD AUTO: 23.1 % (ref 34.8–46.1)
HGB BLD-MCNC: 5.7 G/DL (ref 11.5–15.4)
HGB BLD-MCNC: 6.4 G/DL (ref 11.5–15.4)
MCH RBC QN AUTO: 18.3 PG (ref 26.8–34.3)
MCHC RBC AUTO-ENTMCNC: 27.7 G/DL (ref 31.4–37.4)
MCV RBC AUTO: 66 FL (ref 82–98)
PLATELET # BLD AUTO: 392 THOUSANDS/UL (ref 149–390)
POTASSIUM SERPL-SCNC: 3.6 MMOL/L (ref 3.5–5.3)
RBC # BLD AUTO: 3.5 MILLION/UL (ref 3.81–5.12)
SODIUM SERPL-SCNC: 140 MMOL/L (ref 135–147)
TSH SERPL DL<=0.05 MIU/L-ACNC: 3.21 UIU/ML (ref 0.45–4.5)
UNIT DISPENSE STATUS: NORMAL
UNIT PRODUCT CODE: NORMAL
UNIT PRODUCT VOLUME: 350 ML
UNIT RH: NORMAL
WBC # BLD AUTO: 5.08 THOUSAND/UL (ref 4.31–10.16)

## 2024-06-29 PROCEDURE — 83918 ORGANIC ACIDS TOTAL QUANT: CPT | Performed by: INTERNAL MEDICINE

## 2024-06-29 PROCEDURE — 84443 ASSAY THYROID STIM HORMONE: CPT | Performed by: INTERNAL MEDICINE

## 2024-06-29 PROCEDURE — 85018 HEMOGLOBIN: CPT | Performed by: INTERNAL MEDICINE

## 2024-06-29 PROCEDURE — 83010 ASSAY OF HAPTOGLOBIN QUANT: CPT | Performed by: INTERNAL MEDICINE

## 2024-06-29 PROCEDURE — 85027 COMPLETE CBC AUTOMATED: CPT | Performed by: INTERNAL MEDICINE

## 2024-06-29 PROCEDURE — 80048 BASIC METABOLIC PNL TOTAL CA: CPT | Performed by: INTERNAL MEDICINE

## 2024-06-29 PROCEDURE — P9040 RBC LEUKOREDUCED IRRADIATED: HCPCS

## 2024-06-29 PROCEDURE — 99231 SBSQ HOSP IP/OBS SF/LOW 25: CPT | Performed by: STUDENT IN AN ORGANIZED HEALTH CARE EDUCATION/TRAINING PROGRAM

## 2024-06-29 PROCEDURE — P9016 RBC LEUKOCYTES REDUCED: HCPCS

## 2024-06-29 PROCEDURE — 99222 1ST HOSP IP/OBS MODERATE 55: CPT | Performed by: INTERNAL MEDICINE

## 2024-06-29 RX ADMIN — FOLIC ACID 1 MG: 1 TABLET ORAL at 08:58

## 2024-06-29 RX ADMIN — CYANOCOBALAMIN 1000 MCG: 1000 INJECTION, SOLUTION INTRAMUSCULAR at 08:58

## 2024-06-29 RX ADMIN — IRON SUCROSE 200 MG: 20 INJECTION, SOLUTION INTRAVENOUS at 08:58

## 2024-06-29 NOTE — PLAN OF CARE
Problem: PAIN - ADULT  Goal: Verbalizes/displays adequate comfort level or baseline comfort level  Description: Interventions:  - Encourage patient to monitor pain and request assistance  - Assess pain using appropriate pain scale  - Administer analgesics based on type and severity of pain and evaluate response  - Implement non-pharmacological measures as appropriate and evaluate response  - Consider cultural and social influences on pain and pain management  - Notify physician/advanced practitioner if interventions unsuccessful or patient reports new pain  Outcome: Progressing     Problem: INFECTION - ADULT  Goal: Absence or prevention of progression during hospitalization  Description: INTERVENTIONS:  - Assess and monitor for signs and symptoms of infection  - Monitor lab/diagnostic results  - Monitor all insertion sites, i.e. indwelling lines, tubes, and drains  - Monitor endotracheal if appropriate and nasal secretions for changes in amount and color  - Price appropriate cooling/warming therapies per order  - Administer medications as ordered  - Instruct and encourage patient and family to use good hand hygiene technique  - Identify and instruct in appropriate isolation precautions for identified infection/condition  Outcome: Progressing     Problem: SAFETY ADULT  Goal: Maintain or return to baseline ADL function  Description: INTERVENTIONS:  -  Assess patient's ability to carry out ADLs; assess patient's baseline for ADL function and identify physical deficits which impact ability to perform ADLs (bathing, care of mouth/teeth, toileting, grooming, dressing, etc.)  - Assess/evaluate cause of self-care deficits   - Assess range of motion  - Assess patient's mobility; develop plan if impaired  - Assess patient's need for assistive devices and provide as appropriate  - Encourage maximum independence but intervene and supervise when necessary  - Involve family in performance of ADLs  - Assess for home care  needs following discharge   - Consider OT consult to assist with ADL evaluation and planning for discharge  - Provide patient education as appropriate  Outcome: Progressing     Problem: CARDIOVASCULAR - ADULT  Goal: Maintains optimal cardiac output and hemodynamic stability  Description: INTERVENTIONS:  - Monitor I/O, vital signs and rhythm  - Monitor for S/S and trends of decreased cardiac output  - Administer and titrate ordered vasoactive medications to optimize hemodynamic stability  - Assess quality of pulses, skin color and temperature  - Assess for signs of decreased coronary artery perfusion  - Instruct patient to report change in severity of symptoms  Outcome: Progressing     Problem: RESPIRATORY - ADULT  Goal: Achieves optimal ventilation and oxygenation  Description: INTERVENTIONS:  - Assess for changes in respiratory status  - Assess for changes in mentation and behavior  - Position to facilitate oxygenation and minimize respiratory effort  - Oxygen administered by appropriate delivery if ordered  - Initiate smoking cessation education as indicated  - Encourage broncho-pulmonary hygiene including cough, deep breathe, Incentive Spirometry  - Assess the need for suctioning and aspirate as needed  - Assess and instruct to report SOB or any respiratory difficulty  - Respiratory Therapy support as indicated  Outcome: Progressing     Problem: HEMATOLOGIC - ADULT  Goal: Maintains hematologic stability  Description: INTERVENTIONS  - Assess for signs and symptoms of bleeding or hemorrhage  - Monitor labs  - Administer supportive blood products/factors as ordered and appropriate  Outcome: Progressing

## 2024-06-29 NOTE — CONSULTS
Hematology/Oncology Consult Note      [facility]ATOLOG[facility] [place]  [address] [facility] [id number] [phone]    Date of Service: 6/29/2024     Admitting Diagnosis: Leukopenia [D72.819]  Anemia [D64.9]  Encounter to discuss test results [Z71.2]    Reason for Consultation: Microcytic anemia/EDUAR    Referral Physician: No ref. provider found    Oncology/Hematology History:  6/28/2024: Hgb 3.7, MCV 66, ferritin 2, TIBC 691, iron 11, Fe Sat 2%  6/29/2024: WBC 5k, Hgb 6.4, plt 392k    Assessment and Recommendations:   Acute microcytic anemia in the setting of heavy menstrual period blood loss and established EDUAR based off of 6/28 labs  Pt is s/p 4U PRBC. Recommend IV Venofer 300mg Monday   Please initiate PO Iron today to be taken Monday, Wednesday, Friday  Outpatient heme f/u to assess if further IV Iron needed on a recurring basis  Outpatient gyn referral to address options to reduce her bleeding and need for future parenteral iron    Above discussed with the primary team    David Payne MD         Thank you very much for your consultation and making us part of this nice patient's care. I will continue to follow closely with you. Please contact me with any additional questions.    Disclaimer: This document was prepared using [employer] Direct technology. If a word or phrase is confusing, or does not make sense, this is likely due to recognition error which was not discovered during the providers review. If you believe an error has occurred, please contact me through HemOn service line for apple?cation.    HPI:   Tatiana Lopez is a 39 y.o. female admitted by Rui Hemphill MD with heavy menstrual periods since she was a teenager. No other acute issues. Feeling better since her 34th PRBC.    Review of system:  10-point review of system was performed, pertinent positive and negative were detailed as above    History reviewed. No  pertinent past medical history.    History reviewed. No pertinent surgical history.    History reviewed. No pertinent family history.    Social History     Socioeconomic History    Marital status: Single     Spouse name: Not on file    Number of children: Not on file    Years of education: Not on file    Highest education level: Not on file   Occupational History    Not on file   Tobacco Use    Smoking status: Never    Smokeless tobacco: Never   Vaping Use    Vaping status: Never Used   Substance and Sexual Activity    Alcohol use: Yes     Comment: occasional    Drug use: Never    Sexual activity: Not on file   Other Topics Concern    Not on file   Social History Narrative    Not on file     Social Determinants of Health     Financial Resource Strain: Not on file   Food Insecurity: Not on file   Transportation Needs: Not on file   Physical Activity: Not on file   Stress: Not on file   Social Connections: Not on file   Intimate Partner Violence: Not on file   Housing Stability: Not on file       Allergies   Allergen Reactions    Ceclor [Cefaclor]        Current Facility-Administered Medications   Medication Dose Route Frequency Provider Last Rate Last Admin    cyanocobalamin injection 1,000 mcg  1,000 mcg Intramuscular Daily Rui Hemphill MD   1,000 mcg at 06/29/24 0858    folic acid (FOLVITE) tablet 1 mg  1 mg Oral Daily Rui Hemphill MD   1 mg at 06/29/24 0858    iron sucrose (VENOFER) 200 mg in sodium chloride 0.9 % 100 mL IVPB  200 mg Intravenous Daily Rui Hemphill  mL/hr at 06/29/24 0858 200 mg at 06/29/24 0858       Medications Prior to Admission   Medication Sig Dispense Refill Last Dose    chlorhexidine (PERIDEX) 0.12 % solution Apply 15 mL to the mouth or throat 2 (two) times a day (Patient not taking: Reported on 6/28/2024) 120 mL 0 Not Taking    fexofenadine-pseudoephedrine (ALLEGRA-D 24) 180-240 MG per 24 hr tablet Take 1 tablet by mouth daily (Patient not taking: Reported on 6/28/2024)   Not  "Taking       Objective:     24 Hour Vitals Assessment:     BP-Systolic (24hrs), Av , Min:124 , Max:172   BP-Diastolic (24hrs), Av, Min:67, Max:85  BP  Min: 124/70  Max: 172/74  Temp  Av.6 °F (37 °C)  Min: 97.8 °F (36.6 °C)  Max: 99.4 °F (37.4 °C)  Pulse  Av.3  Min: 68  Max: 80  Resp  Av.6  Min: 16  Max: 18  SpO2  Av.4 %  Min: 96 %  Max: 100 %    PHYSICIAN EXAM:      Physical exam:  General:  Appears in no distress, sitting up in bed  Neuro:  Speaks in full sentences, no focal deficits noted  Pulmonary:  No cyanosis, no accessory muscle use  Cardiovascular: Regular rate, no abnormal rhythm noted  GI:  Appears nondistended, no masses noted  Extremities:  No new rash noted, no cyanosis  Psychiatry:  Normal mood with congruent affect  Ear nose and throat:  Atraumatic, extraocular muscles intact      Data Review:    Image Study:      LABS:  CBC:  Recent Labs     24  1339 24  0546   WBC 3.95* 5.08   MCV 58* 66*   MCH 13.9* 18.3*   MCHC 23.9* 27.7*   RDW 27.8* 30.9*     CMP:   Recent Labs     24  1339 24  0546   SODIUM 139 140   BUN 8 8   CALCIUM 8.5 8.5   CREATININE 0.49* 0.47*       MISC. LABS:    Recent Labs     24  1339        LFT:   Recent Labs     24  1339   AST 12*       Coags:  Invalid input(s): \"COAGPROFILE\"  No results for input(s): \"INR\", \"PTT\" in the last 72 hours.    Invalid input(s): \"PTP\", \"FIBRINOGENQN\"    By:  David Payne MD, 2024, 12:13 PM                                  Primary Care Physician:  No primary care provider on file.       "

## 2024-06-29 NOTE — PLAN OF CARE
Problem: PAIN - ADULT  Goal: Verbalizes/displays adequate comfort level or baseline comfort level  Description: Interventions:  - Encourage patient to monitor pain and request assistance  - Assess pain using appropriate pain scale  - Administer analgesics based on type and severity of pain and evaluate response  - Implement non-pharmacological measures as appropriate and evaluate response  - Consider cultural and social influences on pain and pain management  - Notify physician/advanced practitioner if interventions unsuccessful or patient reports new pain  Outcome: Progressing     Problem: INFECTION - ADULT  Goal: Absence or prevention of progression during hospitalization  Description: INTERVENTIONS:  - Assess and monitor for signs and symptoms of infection  - Monitor lab/diagnostic results  - Monitor all insertion sites, i.e. indwelling lines, tubes, and drains  - Monitor endotracheal if appropriate and nasal secretions for changes in amount and color  - [place] appropriate cooling/warming therapies per order  - Administer medications as ordered  - Instruct and encourage patient and family to use good hand hygiene technique  - Identify and instruct in appropriate isolation precautions for identified infection/condition  Outcome: Progressing  Goal: Absence of fever/infection during neutropenic period  Description: INTERVENTIONS:  - Monitor WBC    Outcome: Progressing     Problem: SAFETY ADULT  Goal: Patient will remain free of falls  Description: INTERVENTIONS:  - Educate patient/family on patient safety including physical limitations  - Instruct patient to call for assistance with activity   - Consult OT/PT to assist with strengthening/mobility   - Keep Call bell within reach  - Keep bed low and locked with side rails adjusted as appropriate  - Keep care items and personal belongings within reach  - Initiate and maintain comfort rounds  - Make Fall Risk Sign visible to staff  - Offer Toileting every 2 Hours,  in advance of need  - Initiate/Maintain bed alarm  - Obtain necessary fall risk management equipment  - Apply yellow socks and bracelet for high fall risk patients  - Consider moving patient to room near nurses station  Outcome: Progressing  Goal: Maintain or return to baseline ADL function  Description: INTERVENTIONS:  -  Assess patient's ability to carry out ADLs; assess patient's baseline for ADL function and identify physical deficits which impact ability to perform ADLs (bathing, care of mouth/teeth, toileting, grooming, dressing, etc.)  - Assess/evaluate cause of self-care deficits   - Assess range of motion  - Assess patient's mobility; develop plan if impaired  - Assess patient's need for assistive devices and provide as appropriate  - Encourage maximum independence but intervene and supervise when necessary  - Involve family in performance of ADLs  - Assess for home care needs following discharge   - Consider OT consult to assist with ADL evaluation and planning for discharge  - Provide patient education as appropriate  Outcome: Progressing  Goal: Maintains/Returns to pre admission functional level  Description: INTERVENTIONS:  - Perform AM-PAC 6 Click Basic Mobility/ Daily Activity assessment daily.  - Set and communicate daily mobility goal to care team and patient/family/caregiver.   - Collaborate with rehabilitation services on mobility goals if consulted  - Ambulate patient 3 times a day  - Out of bed to chair 3 times a day   - Out of bed for meals 3 times a day  - Out of bed for toileting  - Record patient progress and toleration of activity level   Outcome: Progressing     Problem: DISCHARGE PLANNING  Goal: Discharge to home or other facility with appropriate resources  Description: INTERVENTIONS:  - Identify barriers to discharge w/patient and caregiver  - Arrange for needed discharge resources and transportation as appropriate  - Identify discharge learning needs (meds, wound care, etc.)  - Arrange  for interpretive services to assist at discharge as needed  - Refer to Case Management Department for coordinating discharge planning if the patient needs post-hospital services based on physician/advanced practitioner order or complex needs related to functional status, cognitive ability, or social support system  Outcome: Progressing     Problem: Knowledge Deficit  Goal: Patient/family/caregiver demonstrates understanding of disease process, treatment plan, medications, and discharge instructions  Description: Complete learning assessment and assess knowledge base.  Interventions:  - Provide teaching at level of understanding  - Provide teaching via preferred learning methods  Outcome: Progressing     Problem: CARDIOVASCULAR - ADULT  Goal: Maintains optimal cardiac output and hemodynamic stability  Description: INTERVENTIONS:  - Monitor I/O, vital signs and rhythm  - Monitor for S/S and trends of decreased cardiac output  - Administer and titrate ordered vasoactive medications to optimize hemodynamic stability  - Assess quality of pulses, skin color and temperature  - Assess for signs of decreased coronary artery perfusion  - Instruct patient to report change in severity of symptoms  Outcome: Progressing  Goal: Absence of cardiac dysrhythmias or at baseline rhythm  Description: INTERVENTIONS:  - Continuous cardiac monitoring, vital signs, obtain 12 lead EKG if ordered  - Administer antiarrhythmic and heart rate control medications as ordered  - Monitor electrolytes and administer replacement therapy as ordered  Outcome: Progressing     Problem: RESPIRATORY - ADULT  Goal: Achieves optimal ventilation and oxygenation  Description: INTERVENTIONS:  - Assess for changes in respiratory status  - Assess for changes in mentation and behavior  - Position to facilitate oxygenation and minimize respiratory effort  - Oxygen administered by appropriate delivery if ordered  - Initiate smoking cessation education as indicated  -  Encourage broncho-pulmonary hygiene including cough, deep breathe, Incentive Spirometry  - Assess the need for suctioning and aspirate as needed  - Assess and instruct to report SOB or any respiratory difficulty  - Respiratory Therapy support as indicated  Outcome: Progressing     Problem: HEMATOLOGIC - ADULT  Goal: Maintains hematologic stability  Description: INTERVENTIONS  - Assess for signs and symptoms of bleeding or hemorrhage  - Monitor labs  - Administer supportive blood products/factors as ordered and appropriate  Outcome: Progressing

## 2024-06-29 NOTE — PROGRESS NOTES
[facility]  Progress Note  Name: Tatiana Lopez I  MRN: [id number]  Unit/Bed#: [facility] 905-01 I Date of Admission: 6/28/2024   Date of Service: 6/29/2024 I Hospital Day: 1    Assessment & Plan   * Anemia  Assessment & Plan  Reports having routine outpatient blood work and was sent for low hemoglobin level  Hemoglobin presentation 3.7.  No prior history of anemia.  She does report intermittent heavy menstrual periods.  Last menstrual period started last Friday and ended this morning, denies passing any clots.  Denies hematuria, hematemesis, rectal bleeding.  Iron studies remarkable for iron deficiency anemia.  Total bili and LDH within normal limit, haptoglobin pending but low suspicious for any underlying hemolysis.  Patient is s/p 3 unit transfusion of PRBC with improvement in hemoglobin to 6.4 this morning, will order 1 more dose of PRBC.  She will also be started on IV iron on Monday.   Hematology consulted due to concurrent leukopenia.  Patient will need referral for GYN on discharge.               VTE Pharmacologic Prophylaxis:   Moderate Risk (Score 3-4) - Pharmacological DVT Prophylaxis Contraindicated. Sequential Compression Devices Ordered.    Mobility:   Basic Mobility Inpatient Raw Score: 18  JH-HLM Goal: 6: Walk 10 steps or more  JH-HLM Achieved: 7: Walk 25 feet or more  JH-HLM Goal achieved. Continue to encourage appropriate mobility.    Patient Centered Rounds: I performed bedside rounds with nursing staff today.   Discussions with Specialists or Other Care Team Provider: Hematology.    Education and Discussions with Family / Patient:  Pt updated at bedside.     Total Time Spent on Date of Encounter in care of patient: 27 mins. This time was spent on one or more of the following: performing physical exam; counseling and coordination of care; obtaining or reviewing history; documenting in the medical record; reviewing/ordering tests, medications or procedures;  communicating with other healthcare professionals and discussing with patient's family/caregivers.    Current Length of Stay: 1 day(s)  Current Patient Status: Inpatient   Certification Statement: The patient will continue to require additional inpatient hospital stay due to ongoing management for anemia requiring transfusions and IV iron.  Discharge Plan: Anticipate discharge in 48 hrs to home.    Code Status: Level 1 - Full Code    Subjective:   Patient seen at bedside, denies any bleeding in urine or stool.  She reports her menstrual cycle ended this morning.  Denies passing any clots.    Objective:     Vitals:   Temp (24hrs), Av.7 °F (37.1 °C), Min:97.8 °F (36.6 °C), Max:99.8 °F (37.7 °C)    Temp:  [97.8 °F (36.6 °C)-99.8 °F (37.7 °C)] 99.8 °F (37.7 °C)  HR:  [68-80] 75  Resp:  [16-18] 18  BP: (124-172)/(67-85) 147/79  SpO2:  [96 %-100 %] 96 %  There is no height or weight on file to calculate BMI.     Input and Output Summary (last 24 hours):     Intake/Output Summary (Last 24 hours) at 2024 1605  Last data filed at 2024 1419  Gross per 24 hour   Intake 1818.94 ml   Output 825 ml   Net 993.94 ml       Physical Exam:   Physical Exam  Constitutional:       Appearance: Normal appearance.   HENT:      Head: Normocephalic and atraumatic.   Cardiovascular:      Rate and Rhythm: Normal rate and regular rhythm.   Pulmonary:      Effort: Pulmonary effort is normal.      Breath sounds: Normal breath sounds.   Abdominal:      General: Bowel sounds are normal.      Palpations: Abdomen is soft.   Skin:     General: Skin is warm.   Neurological:      Mental Status: She is alert and oriented to person, place, and time.          Additional Data:     Labs:  Results from last 7 days   Lab Units 24  0546 24  0002 24  1459 24  1339   WBC Thousand/uL 5.08  --   --  3.95*   HEMOGLOBIN g/dL 6.4*   < > 3.7* 3.9*   HEMATOCRIT % 23.1*  --  14.6* 16.3*   PLATELETS Thousands/uL 392*  --   --  372    SEGS PCT %  --   --   --  52   LYMPHO PCT %  --   --  32 36   MONO PCT %  --   --  2* 8   EOS PCT %  --   --   --  1    < > = values in this interval not displayed.     Results from last 7 days   Lab Units 06/29/24  0546 06/28/24  1339   SODIUM mmol/L 140 139   POTASSIUM mmol/L 3.6 3.6   CHLORIDE mmol/L 107 107   CO2 mmol/L 25 26   BUN mg/dL 8 8   CREATININE mg/dL 0.47* 0.49*   ANION GAP mmol/L 8 6   CALCIUM mg/dL 8.5 8.5   ALBUMIN g/dL  --  4.0   TOTAL BILIRUBIN mg/dL  --  0.31   ALK PHOS U/L  --  30*   ALT U/L  --  5*   AST U/L  --  12*   GLUCOSE RANDOM mg/dL 68 76                       Lines/Drains:  Invasive Devices       Peripheral Intravenous Line  Duration             Peripheral IV 06/28/24 Distal;Left;Ventral (anterior) Forearm 1 day                          Imaging: No pertinent imaging reviewed.    Recent Cultures (last 7 days):         Last 24 Hours Medication List:   Current Facility-Administered Medications   Medication Dose Route Frequency Provider Last Rate    cyanocobalamin  1,000 mcg Intramuscular Daily Rui Hemphill MD      folic acid  1 mg Oral Daily Rui Hemphill MD      iron sucrose  200 mg Intravenous Daily Rui Hemphill  mg (06/29/24 0858)        Today, Patient Was Seen By: Geno Guillen MD    **Please Note: This note may have been constructed using a voice recognition system.**

## 2024-06-29 NOTE — CASE MANAGEMENT
Case Management Assessment & Discharge Planning Note    Patient name Tatiana Lopez  Location [facility] 905/[facility]-01 MRN [id number]  : 1985 Date 2024       Current Admission Date: 2024  Current Admission Diagnosis:Anemia   Patient Active Problem List    Diagnosis Date Noted Date Diagnosed    Anemia 2024       LOS (days): 1  Geometric Mean LOS (GMLOS) (days):   Days to GMLOS:     OBJECTIVE:    Risk of Unplanned Readmission Score: 5.74         Current admission status: Inpatient       Preferred Pharmacy:   UNKNOWN - FOLLOW UP PRIOR TO DISCHARGE TO E-PRESCRIBE  No address on file      Primary Care Provider: No primary care provider on file.    Primary Insurance: MEDICARE  Secondary Insurance: HU[employer]    ASSESSMENT:  Active Health Care Proxies    There are no active Health Care Proxies on file.         Patient Information  Admitted from:: Home  Mental Status: Alert  During Assessment patient was accompanied by: Not accompanied during assessment  Assessment information provided by:: Patient  Primary Caregiver: Self  Support Systems: Parent  County of Residence: [place]  What city do you live in?: [place]  Home entry access options. Select all that apply.: Stairs  Number of steps to enter home.: One Flight (13)  Living Arrangements: Lives w/ Parent(s), Other (Comment) (Brother)  Is patient a ?: No    Activities of Daily Living Prior to Admission  Functional Status: Independent  Completes ADLs independently?: Yes  Ambulates independently?: Yes  Does patient use assisted devices?: No  Does patient currently own DME?: No  Does patient have a history of Outpatient Therapy (PT/OT)?: Yes  Does the patient have a history of Short-Term Rehab?: No  Does patient have a history of HHC?: No  Does patient currently have HHC?: No         Patient Information Continued  Income Source: SSI/SSD  Does patient have prescription coverage?: Yes  Does patient receive dialysis treatments?: No  Does  patient have a history of substance abuse?: No  Does patient have a history of Mental Health Diagnosis?: No         Means of Transportation  Means of Transport to Appts:: Family transport      Social Determinants of Health (SDOH)      Flowsheet Row Most Recent Value   Housing Stability    In the last 12 months, was there a time when you were not able to pay the mortgage or rent on time? N   At any time in the past 12 months, were you homeless or living in a shelter (including now)? N   Transportation Needs    In the past 12 months, has lack of transportation kept you from medical appointments or from getting medications? no   In the past 12 months, has lack of transportation kept you from meetings, work, or from getting things needed for daily living? No   Food Insecurity    Within the past 12 months, you worried that your food would run out before you got the money to buy more. Never true   Within the past 12 months, the food you bought just didn't last and you didn't have money to get more. Never true   Utilities    In the past 12 months has the electric, gas, oil, or water company threatened to shut off services in your home? No            DISCHARGE DETAILS:    Discharge planning discussed with:: Pt  Freedom of Choice: Yes  Comments - Freedom of Choice: pending recs

## 2024-06-30 LAB
ABO GROUP BLD BPU: NORMAL
ANION GAP SERPL CALCULATED.3IONS-SCNC: 7 MMOL/L (ref 4–13)
BPU ID: NORMAL
BUN SERPL-MCNC: 5 MG/DL (ref 5–25)
CALCIUM SERPL-MCNC: 8.7 MG/DL (ref 8.4–10.2)
CHLORIDE SERPL-SCNC: 107 MMOL/L (ref 96–108)
CO2 SERPL-SCNC: 25 MMOL/L (ref 21–32)
CREAT SERPL-MCNC: 0.48 MG/DL (ref 0.6–1.3)
CROSSMATCH: NORMAL
ERYTHROCYTE [DISTWIDTH] IN BLOOD BY AUTOMATED COUNT: 30.3 % (ref 11.6–15.1)
GFR SERPL CREATININE-BSD FRML MDRD: 123 ML/MIN/1.73SQ M
GLUCOSE SERPL-MCNC: 75 MG/DL (ref 65–140)
GLUCOSE SERPL-MCNC: 93 MG/DL (ref 65–140)
HAPTOGLOB SERPL-MCNC: 60 MG/DL (ref 33–278)
HCT VFR BLD AUTO: 26.1 % (ref 34.8–46.1)
HGB BLD-MCNC: 7.7 G/DL (ref 11.5–15.4)
MCH RBC QN AUTO: 19.5 PG (ref 26.8–34.3)
MCHC RBC AUTO-ENTMCNC: 29.5 G/DL (ref 31.4–37.4)
MCV RBC AUTO: 66 FL (ref 82–98)
PLATELET # BLD AUTO: 473 THOUSANDS/UL (ref 149–390)
POTASSIUM SERPL-SCNC: 3.3 MMOL/L (ref 3.5–5.3)
RBC # BLD AUTO: 3.94 MILLION/UL (ref 3.81–5.12)
SODIUM SERPL-SCNC: 139 MMOL/L (ref 135–147)
UNIT DISPENSE STATUS: NORMAL
UNIT PRODUCT CODE: NORMAL
UNIT PRODUCT VOLUME: 350 ML
UNIT RH: NORMAL
WBC # BLD AUTO: 8.32 THOUSAND/UL (ref 4.31–10.16)

## 2024-06-30 PROCEDURE — 80048 BASIC METABOLIC PNL TOTAL CA: CPT | Performed by: STUDENT IN AN ORGANIZED HEALTH CARE EDUCATION/TRAINING PROGRAM

## 2024-06-30 PROCEDURE — 82948 REAGENT STRIP/BLOOD GLUCOSE: CPT

## 2024-06-30 PROCEDURE — 85027 COMPLETE CBC AUTOMATED: CPT | Performed by: STUDENT IN AN ORGANIZED HEALTH CARE EDUCATION/TRAINING PROGRAM

## 2024-06-30 PROCEDURE — 99231 SBSQ HOSP IP/OBS SF/LOW 25: CPT | Performed by: STUDENT IN AN ORGANIZED HEALTH CARE EDUCATION/TRAINING PROGRAM

## 2024-06-30 RX ORDER — ACETAMINOPHEN 325 MG/1
650 TABLET ORAL ONCE
Status: COMPLETED | OUTPATIENT
Start: 2024-06-30 | End: 2024-06-30

## 2024-06-30 RX ORDER — POTASSIUM CHLORIDE 20 MEQ/1
40 TABLET, EXTENDED RELEASE ORAL ONCE
Status: COMPLETED | OUTPATIENT
Start: 2024-06-30 | End: 2024-06-30

## 2024-06-30 RX ORDER — FERROUS GLUCONATE 324(38)MG
324 TABLET ORAL
Status: DISCONTINUED | OUTPATIENT
Start: 2024-07-01 | End: 2024-07-01 | Stop reason: HOSPADM

## 2024-06-30 RX ADMIN — CYANOCOBALAMIN 1000 MCG: 1000 INJECTION, SOLUTION INTRAMUSCULAR at 08:40

## 2024-06-30 RX ADMIN — IRON SUCROSE 200 MG: 20 INJECTION, SOLUTION INTRAVENOUS at 08:40

## 2024-06-30 RX ADMIN — ACETAMINOPHEN 650 MG: 325 TABLET, FILM COATED ORAL at 09:50

## 2024-06-30 RX ADMIN — POTASSIUM CHLORIDE 40 MEQ: 1500 TABLET, EXTENDED RELEASE ORAL at 09:50

## 2024-06-30 RX ADMIN — FOLIC ACID 1 MG: 1 TABLET ORAL at 08:40

## 2024-06-30 NOTE — PLAN OF CARE
Problem: PAIN - ADULT  Goal: Verbalizes/displays adequate comfort level or baseline comfort level  Description: Interventions:  - Encourage patient to monitor pain and request assistance  - Assess pain using appropriate pain scale  - Administer analgesics based on type and severity of pain and evaluate response  - Implement non-pharmacological measures as appropriate and evaluate response  - Consider cultural and social influences on pain and pain management  - Notify physician/advanced practitioner if interventions unsuccessful or patient reports new pain  Outcome: Progressing     Problem: INFECTION - ADULT  Goal: Absence or prevention of progression during hospitalization  Description: INTERVENTIONS:  - Assess and monitor for signs and symptoms of infection  - Monitor lab/diagnostic results  - Monitor all insertion sites, i.e. indwelling lines, tubes, and drains  - Monitor endotracheal if appropriate and nasal secretions for changes in amount and color  - [place] appropriate cooling/warming therapies per order  - Administer medications as ordered  - Instruct and encourage patient and family to use good hand hygiene technique  - Identify and instruct in appropriate isolation precautions for identified infection/condition  Outcome: Progressing  Goal: Absence of fever/infection during neutropenic period  Description: INTERVENTIONS:  - Monitor WBC    Outcome: Progressing     Problem: SAFETY ADULT  Goal: Patient will remain free of falls  Description: INTERVENTIONS:  - Educate patient/family on patient safety including physical limitations  - Instruct patient to call for assistance with activity   - Consult OT/PT to assist with strengthening/mobility   - Keep Call bell within reach  - Keep bed low and locked with side rails adjusted as appropriate  - Keep care items and personal belongings within reach  - Initiate and maintain comfort rounds  - Make Fall Risk Sign visible to staff  - Offer Toileting every 2 Hours,  in advance of need  - Initiate/Maintain bed alarm  - Obtain necessary fall risk management equipment  - Apply yellow socks and bracelet for high fall risk patients  - Consider moving patient to room near nurses station  Outcome: Progressing  Goal: Maintain or return to baseline ADL function  Description: INTERVENTIONS:  -  Assess patient's ability to carry out ADLs; assess patient's baseline for ADL function and identify physical deficits which impact ability to perform ADLs (bathing, care of mouth/teeth, toileting, grooming, dressing, etc.)  - Assess/evaluate cause of self-care deficits   - Assess range of motion  - Assess patient's mobility; develop plan if impaired  - Assess patient's need for assistive devices and provide as appropriate  - Encourage maximum independence but intervene and supervise when necessary  - Involve family in performance of ADLs  - Assess for home care needs following discharge   - Consider OT consult to assist with ADL evaluation and planning for discharge  - Provide patient education as appropriate  Outcome: Progressing  Goal: Maintains/Returns to pre admission functional level  Description: INTERVENTIONS:  - Perform AM-PAC 6 Click Basic Mobility/ Daily Activity assessment daily.  - Set and communicate daily mobility goal to care team and patient/family/caregiver.   - Collaborate with rehabilitation services on mobility goals if consulted  - Ambulate patient 3 times a day  - Out of bed to chair 3 times a day   - Out of bed for meals 3 times a day  - Out of bed for toileting  - Record patient progress and toleration of activity level   Outcome: Progressing     Problem: DISCHARGE PLANNING  Goal: Discharge to home or other facility with appropriate resources  Description: INTERVENTIONS:  - Identify barriers to discharge w/patient and caregiver  - Arrange for needed discharge resources and transportation as appropriate  - Identify discharge learning needs (meds, wound care, etc.)  - Arrange  for interpretive services to assist at discharge as needed  - Refer to Case Management Department for coordinating discharge planning if the patient needs post-hospital services based on physician/advanced practitioner order or complex needs related to functional status, cognitive ability, or social support system  Outcome: Progressing     Problem: Knowledge Deficit  Goal: Patient/family/caregiver demonstrates understanding of disease process, treatment plan, medications, and discharge instructions  Description: Complete learning assessment and assess knowledge base.  Interventions:  - Provide teaching at level of understanding  - Provide teaching via preferred learning methods  Outcome: Progressing     Problem: CARDIOVASCULAR - ADULT  Goal: Maintains optimal cardiac output and hemodynamic stability  Description: INTERVENTIONS:  - Monitor I/O, vital signs and rhythm  - Monitor for S/S and trends of decreased cardiac output  - Administer and titrate ordered vasoactive medications to optimize hemodynamic stability  - Assess quality of pulses, skin color and temperature  - Assess for signs of decreased coronary artery perfusion  - Instruct patient to report change in severity of symptoms  Outcome: Progressing  Goal: Absence of cardiac dysrhythmias or at baseline rhythm  Description: INTERVENTIONS:  - Continuous cardiac monitoring, vital signs, obtain 12 lead EKG if ordered  - Administer antiarrhythmic and heart rate control medications as ordered  - Monitor electrolytes and administer replacement therapy as ordered  Outcome: Progressing     Problem: RESPIRATORY - ADULT  Goal: Achieves optimal ventilation and oxygenation  Description: INTERVENTIONS:  - Assess for changes in respiratory status  - Assess for changes in mentation and behavior  - Position to facilitate oxygenation and minimize respiratory effort  - Oxygen administered by appropriate delivery if ordered  - Initiate smoking cessation education as indicated  -  Encourage broncho-pulmonary hygiene including cough, deep breathe, Incentive Spirometry  - Assess the need for suctioning and aspirate as needed  - Assess and instruct to report SOB or any respiratory difficulty  - Respiratory Therapy support as indicated  Outcome: Progressing     Problem: HEMATOLOGIC - ADULT  Goal: Maintains hematologic stability  Description: INTERVENTIONS  - Assess for signs and symptoms of bleeding or hemorrhage  - Monitor labs  - Administer supportive blood products/factors as ordered and appropriate  Outcome: Progressing

## 2024-06-30 NOTE — UTILIZATION REVIEW
Initial Clinical Review    Admission: Date/Time/Statement:   Admission Orders (From admission, onward)       Ordered        06/28/24 1622  INPATIENT ADMISSION  Once                          Orders Placed This Encounter   Procedures    INPATIENT ADMISSION     Standing Status:   Standing     Number of Occurrences:   1     Order Specific Question:   Level of Care     Answer:   Med Surg [16]     Order Specific Question:   Estimated length of stay     Answer:   More than 2 Midnights     Order Specific Question:   Certification     Answer:   I certify that inpatient services are medically necessary for this patient for a duration of greater than two midnights. See H&P and MD Progress Notes for additional information about the patient's course of treatment.     ED Arrival Information       Expected   -    Arrival   6/28/2024 11:13    Acuity   Urgent              Means of arrival   Ambulance    Escorted by   [facility]    Service   Hospitalist    Admission type   Emergency              Arrival complaint   abn lab             Chief Complaint   Patient presents with    Abnormal Lab     Pt states she was called to come in for low RBCs.  Pt states she had it done yesterday at the clinic       Initial Presentation: 39 y.o. female with no significant PMHx, who presented on 6/28 to [facility] ED via EMS due to outpatient routine blood work which showed low hemoglobin level, sent to ED for further evaluation.  Hemoglobin presentation 3.7 with associated mild leukopenia.  Patient reports intermittent episodes of heavy menstrual periods with last episode started last Friday and ending today.  Otherwise denies any other signs of bleeding. Transfused 2 units of blood and admitted for further evaluation.    Plan:  Admit Inpatient status to med surg dt Anemia: recheck hgb post transfusion, order iron studies, folic acid, B12,, T bili, LDH and haptoglobin. Hematology consult, order peripheral smear. Check occult  stool.     Anticipated Length of Stay/Certification Statement: Patient will be admitted on an inpatient basis with an anticipated length of stay of greater than 2 midnights secondary to above.    Date: 6/29   Day 2:  Denies any bleeding in urine or stool. She reports her menstrual cycle ended this morning. Hgb 6.4 today. Iron studies remarkable for iron deficiency anemia. Patient is s/p 3 unit transfusion of PRBC with improvement in hemoglobin to 6.4 this morning, will order 1 more dose of PRBC. The patient will continue to require additional inpatient hospital stay due to ongoing management for anemia requiring transfusions and IV iron.     6/29 Per Hematology: Acute microcytic anemia in the setting of heavy menstrual period blood loss and established EDUAR based off of 6/28 labs  Pt is s/p 4U PRBC. Recommend IV Venofer 300mg Monday   Please initiate PO Iron today to be taken Monday, Wednesday, Friday  Outpatient heme f/u to assess if further IV Iron needed on a recurring basis  Outpatient gyn referral to address options to reduce her bleeding and need for future paren    Date: 6/30  Day 3: Has surpassed a 2nd midnight with active treatments and services. Denies any bleeding in urine or stool. Denies passing any clots. Pt was started on IV iron on admission, will receive 3rd dose tomorrow. Continue to monitor VS and labs. The patient will continue to require additional inpatient hospital stay due to ongoing management for anemia requiring transfusions and IV iron.     ED Triage Vitals [06/28/24 1120]   Temperature Pulse Respirations Blood Pressure SpO2 Pain Score   98 °F (36.7 °C) 77 18 162/70 99 % No Pain     Weight (last 2 days)       Date/Time Weight    06/29/24 1937 89 (196.21)            Vital Signs (last 3 days)       Date/Time Temp Pulse Resp BP MAP (mmHg) SpO2 O2 Device Patient Position - Orthostatic VS Pain    07/01/24 07:54:19 98.7 °F (37.1 °C) 69 18 149/68 95 97 % -- -- --    06/30/24 2300 -- -- 16 -- --  -- -- -- --    06/30/24 21:49:13 -- 70 -- 165/98 120 96 % -- -- --    06/30/24 1936 -- -- -- -- -- -- None (Room air) -- No Pain    06/30/24 1900 -- -- -- -- -- -- -- -- No Pain    06/30/24 14:59:59 98.2 °F (36.8 °C) 67 18 150/81 104 97 % -- -- --    06/30/24 0950 -- -- -- -- -- -- -- -- 9    06/30/24 0850 -- -- -- -- -- -- None (Room air) -- 2    06/30/24 07:24:53 99.5 °F (37.5 °C) 65 17 146/80 102 96 % -- -- --    06/29/24 22:23:27 100.2 °F (37.9 °C) 73 18 150/80 103 96 % -- -- --    06/29/24 2100 -- -- -- -- -- -- None (Room air) -- No Pain    06/29/24 1937 99 °F (37.2 °C) 75 18 147/79 -- -- -- -- --    06/29/24 1700 -- -- -- -- -- 97 % -- -- --    06/29/24 15:19:57 99.8 °F (37.7 °C) 75 18 147/79 102 96 % -- -- --    06/29/24 14:22:45 -- 74 18 146/78 101 97 % -- -- --    06/29/24 1419 99 °F (37.2 °C) 74 18 146/78 -- -- -- -- --    06/29/24 1139 98.5 °F (36.9 °C) 70 17 144/77 99 97 % -- Lying --    06/29/24 1104 98.6 °F (37 °C) 73 18 144/78 -- -- -- Lying --    06/29/24 11:00:43 98.6 °F (37 °C) 73 18 144/78 100 98 % -- -- --    06/29/24 0815 -- -- -- -- -- 98 % None (Room air) -- No Pain    06/29/24 07:58:42 98.4 °F (36.9 °C) 68 17 148/77 101 98 % -- -- --    06/29/24 07:58:18 98.4 °F (36.9 °C) 69 17 148/77 101 97 % -- -- --    06/29/24 04:39:58 98.8 °F (37.1 °C) 70 16 135/67 90 99 % -- -- --    06/29/24 03:26:03 98.2 °F (36.8 °C) 72 17 139/85 103 98 % -- -- --    06/29/24 02:36:53 98.5 °F (36.9 °C) 72 18 141/84 103 96 % -- -- --    06/28/24 22:03:39 98.4 °F (36.9 °C) 69 18 154/83 107 98 % -- -- --    06/28/24 2159 -- -- -- -- -- -- -- -- No Pain    06/28/24 20:11:50 99.4 °F (37.4 °C) 72 18 124/70 88 100 % -- -- --    06/28/24 2011 99.4 °F (37.4 °C) -- -- -- -- 99 % None (Room air) -- No Pain    06/28/24 1915 98.9 °F (37.2 °C) 72 18 151/72 104 98 % None (Room air) Lying --    06/28/24 1901 98.6 °F (37 °C) 71 18 154/72 -- -- -- -- --    06/28/24 1900 -- 72 18 154/72 103 98 % None (Room air) Lying --    06/28/24  1657 98.9 °F (37.2 °C) 80 18 163/71 -- 99 % None (Room air) -- --    06/28/24 1636 97.8 °F (36.6 °C) 80 18 172/74 -- 99 % None (Room air) Lying --    06/28/24 1430 -- 74 -- 157/67 96 100 % -- -- --    06/28/24 1230 -- 72 18 146/67 97 100 % None (Room air) Lying --    06/28/24 1120 98 °F (36.7 °C) 77 18 162/70 -- 99 % None (Room air) Sitting No Pain            Pertinent Labs/Diagnostic Test Results:   Radiology:  No orders to display     Cardiology:  No orders to display     GI:  No orders to display       Results from last 7 days   Lab Units 07/01/24  0536 06/30/24  0443 06/29/24  0546 06/29/24  0002 06/28/24  1459 06/28/24  1339   WBC Thousand/uL 8.04 8.32 5.08  --   --  3.95*   HEMOGLOBIN g/dL 7.6* 7.7* 6.4* 5.7* 3.7* 3.9*   HEMATOCRIT % 26.8* 26.1* 23.1*  --  14.6* 16.3*   PLATELETS Thousands/uL 477* 473* 392*  --   --  372   TOTAL NEUT ABS Thousand/uL  --   --   --   --  2.34 2.07         Results from last 7 days   Lab Units 06/30/24  0443 06/29/24  0546 06/28/24  1339   SODIUM mmol/L 139 140 139   POTASSIUM mmol/L 3.3* 3.6 3.6   CHLORIDE mmol/L 107 107 107   CO2 mmol/L 25 25 26   ANION GAP mmol/L 7 8 6   BUN mg/dL 5 8 8   CREATININE mg/dL 0.48* 0.47* 0.49*   EGFR ml/min/1.73sq m 123 124 123   CALCIUM mg/dL 8.7 8.5 8.5     Results from last 7 days   Lab Units 06/28/24  1339   AST U/L 12*   ALT U/L 5*   ALK PHOS U/L 30*   TOTAL PROTEIN g/dL 6.6   ALBUMIN g/dL 4.0   TOTAL BILIRUBIN mg/dL 0.31   BILIRUBIN DIRECT mg/dL 0.08     Results from last 7 days   Lab Units 07/01/24  0751 06/30/24  2053   POC GLUCOSE mg/dl 89 93     Results from last 7 days   Lab Units 06/30/24  0443 06/29/24  0546 06/28/24  1339   GLUCOSE RANDOM mg/dL 75 68 76     Results from last 7 days   Lab Units 06/29/24  0546   TSH 3RD GENERATON uIU/mL 3.208     Results from last 7 days   Lab Units 06/28/24  1339   FERRITIN ng/mL 2*   IRON SATURATION % 2*   IRON ug/dL 11*   TIBC ug/dL 691*         Results from last 7 days   Lab Units 06/30/24  0555  06/29/24  0555   UNIT PRODUCT CODE  T0581I54 [id number]  [id number]  [id number]  D4487E50   UNIT NUMBER  [id number]  [id number]  [id number]  [id number]   UNITABO  A A  A  A  A   UNITRH  POS POS  POS  POS  POS   CROSSMATCH  Compatible Compatible  Compatible  Compatible  Compatible   UNIT DISPENSE STATUS  Presumed Trans Presumed Trans  Crossmatched  Presumed Trans  Presumed Trans   UNIT PRODUCT VOL mL 350 350  350  350  350     Results from last 7 days   Lab Units 06/28/24  2142   CLARITY UA  Clear   COLOR UA  Light Yellow   SPEC GRAV UA  1.011   PH UA  7.0   GLUCOSE UA mg/dl Negative   KETONES UA mg/dl Negative   BLOOD UA  Moderate*   PROTEIN UA mg/dl Trace*   NITRITE UA  Negative   BILIRUBIN UA  Negative   UROBILINOGEN UA (BE) mg/dl <2.0   LEUKOCYTES UA  Negative   WBC UA /hpf 1-2   RBC UA /hpf 2-4*   BACTERIA UA /hpf Occasional   EPITHELIAL CELLS WET PREP /hpf Occasional   MUCUS THREADS  Occasional*     Results from last 7 days   Lab Units 06/28/24  1459   TOTAL COUNTED  100         History reviewed. No pertinent past medical history.  Present on Admission:  **None**      Admitting Diagnosis: Leukopenia [D72.819]  Anemia [D64.9]  Encounter to discuss test results [Z71.2]  Age/Sex: 39 y.o. female  Admission Orders:  Scheduled Medications:  cyanocobalamin, 1,000 mcg, Intramuscular, Daily  ferrous gluconate, 324 mg, Oral, Daily With Breakfast  folic acid, 1 mg, Oral, Daily  iron sucrose, 200 mg, Intravenous, Daily      Continuous IV Infusions: none     PRN Meds: none       IP CONSULT TO HEMATOLOGY    Network Utilization Review Department  ATTENTION: Please call with any questions or concerns to [phone] and carefully listen to the prompts so that you are directed to the right person. All voicemails are confidential.   For Discharge needs, contact Care Management DC Support Team at [phone] opt. [phone]  Send all requests for admission clinical reviews, approved or  denied determinations and any other requests to dedicated fax number below belonging to the campus where the patient is receiving treatment. List of dedicated fax numbers for the Facilities:  FACILITY NAME UR FAX NUMBER   ADMISSION DENIALS (Administrative/Medical Necessity) [phone]   DISCHARGE SUPPORT TEAM (NETWORK) [phone]   PARENT CHILD HEALTH (Maternity/NICU/Pediatrics) [phone]   [facility] [phone]   [facility] [phone]   [facility] [phone]   [facility] [phone]   Critical access hospital [phone]   [facility] [phone]   [facility] [phone]   [facility] [phone]   [facility] [phone]   [facility] [phone]   [facility] [phone]   [facility] [phone]

## 2024-06-30 NOTE — PROGRESS NOTES
[facility]  Progress Note  Name: Tatiana Lopez I  MRN: [id number]  Unit/Bed#: [facility] 905-01 I Date of Admission: 6/28/2024   Date of Service: 6/30/2024 I Hospital Day: 2    Assessment & Plan   * Anemia  Assessment & Plan  Reports having routine outpatient blood work and was sent for low hemoglobin level  Hemoglobin presentation 3.7.  No prior history of anemia.  She does report intermittent heavy menstrual periods.  Last menstrual period started last Friday and ended this morning, denies passing any clots.  Denies hematuria, hematemesis, rectal bleeding.  Iron studies remarkable for iron deficiency anemia.  Total bili and LDH within normal limit, haptoglobin pending but low suspicious for any underlying hemolysis.  Patient is s/p 3 unit transfusion of PRBC with improvement in hemoglobin to 6.4 this morning, will order 1 more dose of PRBC.  Pt was started on IV iron on admission, will receive 3rd dose tomorrow and can be discharged home with hematology follow up.   Patient will need referral for GYN on discharge.           VTE Pharmacologic Prophylaxis:   Moderate Risk (Score 3-4) - Pharmacological DVT Prophylaxis Contraindicated. Sequential Compression Devices Ordered.    Mobility:   Basic Mobility Inpatient Raw Score: 18  JH-HLM Goal: 6: Walk 10 steps or more  JH-HLM Achieved: 7: Walk 25 feet or more  JH-HLM Goal achieved. Continue to encourage appropriate mobility.    Patient Centered Rounds: I performed bedside rounds with nursing staff today.   Discussions with Specialists or Other Care Team Provider: Hematology.    Education and Discussions with Family / Patient: Updated  (mother and sister) at bedside.    Total Time Spent on Date of Encounter in care of patient: 27 mins. This time was spent on one or more of the following: performing physical exam; counseling and coordination of care; obtaining or reviewing history; documenting in the medical record;  reviewing/ordering tests, medications or procedures; communicating with other healthcare professionals and discussing with patient's family/caregivers.    Current Length of Stay: 2 day(s)  Current Patient Status: Inpatient   Certification Statement: The patient will continue to require additional inpatient hospital stay due to ongoing management for anemia requiring transfusions and IV iron.  Discharge Plan: Anticipate discharge in 48 hrs to home.    Code Status: Level 1 - Full Code    Subjective:   Patient seen at bedside, denies any bleeding in urine or stool.  Denies passing any clots.    Objective:     Vitals:   Temp (24hrs), Av.2 °F (37.3 °C), Min:98.2 °F (36.8 °C), Max:100.2 °F (37.9 °C)    Temp:  [98.2 °F (36.8 °C)-100.2 °F (37.9 °C)] 98.2 °F (36.8 °C)  HR:  [65-75] 67  Resp:  [17-18] 18  BP: (146-150)/(79-81) 150/81  SpO2:  [96 %-97 %] 97 %  Body mass index is 33.68 kg/m².     Input and Output Summary (last 24 hours):     Intake/Output Summary (Last 24 hours) at 2024 1646  Last data filed at 2024 1500  Gross per 24 hour   Intake 540 ml   Output 2400 ml   Net -1860 ml       Physical Exam:   Physical Exam  Constitutional:       Appearance: Normal appearance.   HENT:      Head: Normocephalic and atraumatic.   Cardiovascular:      Rate and Rhythm: Normal rate and regular rhythm.   Pulmonary:      Effort: Pulmonary effort is normal.      Breath sounds: Normal breath sounds.   Abdominal:      General: Bowel sounds are normal.      Palpations: Abdomen is soft.   Skin:     General: Skin is warm.   Neurological:      Mental Status: She is alert and oriented to person, place, and time.          Additional Data:     Labs:  Results from last 7 days   Lab Units 24  0443 24  0002 24  1459 24  1339   WBC Thousand/uL 8.32   < >  --  3.95*   HEMOGLOBIN g/dL 7.7*   < > 3.7* 3.9*   HEMATOCRIT % 26.1*   < > 14.6* 16.3*   PLATELETS Thousands/uL 473*   < >  --  372   SEGS PCT %  --   --   --   52   LYMPHO PCT %  --   --  32 36   MONO PCT %  --   --  2* 8   EOS PCT %  --   --   --  1    < > = values in this interval not displayed.     Results from last 7 days   Lab Units 06/30/24  0443 06/29/24  0546 06/28/24  1339   SODIUM mmol/L 139   < > 139   POTASSIUM mmol/L 3.3*   < > 3.6   CHLORIDE mmol/L 107   < > 107   CO2 mmol/L 25   < > 26   BUN mg/dL 5   < > 8   CREATININE mg/dL 0.48*   < > 0.49*   ANION GAP mmol/L 7   < > 6   CALCIUM mg/dL 8.7   < > 8.5   ALBUMIN g/dL  --   --  4.0   TOTAL BILIRUBIN mg/dL  --   --  0.31   ALK PHOS U/L  --   --  30*   ALT U/L  --   --  5*   AST U/L  --   --  12*   GLUCOSE RANDOM mg/dL 75   < > 76    < > = values in this interval not displayed.                       Lines/Drains:  Invasive Devices       Peripheral Intravenous Line  Duration             Peripheral IV 06/28/24 Distal;Left;Ventral (anterior) Forearm 2 days                          Imaging: No pertinent imaging reviewed.    Recent Cultures (last 7 days):         Last 24 Hours Medication List:   Current Facility-Administered Medications   Medication Dose Route Frequency Provider Last Rate    cyanocobalamin  1,000 mcg Intramuscular Daily Rui Hemphill MD      [START ON 7/1/2024] ferrous gluconate  324 mg Oral Daily With Breakfast Geno Guillen MD      folic acid  1 mg Oral Daily Rui Hemphill MD      iron sucrose  200 mg Intravenous Daily Rui Hemphill  mg (06/30/24 0840)        Today, Patient Was Seen By: Geno Guillen MD    **Please Note: This note may have been constructed using a voice recognition system.**

## 2024-06-30 NOTE — ASSESSMENT & PLAN NOTE
Reports having routine outpatient blood work and was sent for low hemoglobin level  Hemoglobin presentation 3.7.  No prior history of anemia.  She does report intermittent heavy menstrual periods.  Last menstrual period started last Friday and ended this morning, denies passing any clots.  Denies hematuria, hematemesis, rectal bleeding.  Iron studies remarkable for iron deficiency anemia.    Patient seen by heme-onc.  She received 3 doses of IV iron.  Okay to go home on iron supplementation.    I put in referrals for her to see gynecology and hematology in the office and follow-up

## 2024-07-01 ENCOUNTER — TELEPHONE (OUTPATIENT)
Dept: HEMATOLOGY ONCOLOGY | Facility: CLINIC | Age: 39
End: 2024-07-01

## 2024-07-01 VITALS
RESPIRATION RATE: 18 BRPM | OXYGEN SATURATION: 97 % | HEIGHT: 64 IN | WEIGHT: 196.21 LBS | BODY MASS INDEX: 33.5 KG/M2 | HEART RATE: 69 BPM | SYSTOLIC BLOOD PRESSURE: 149 MMHG | DIASTOLIC BLOOD PRESSURE: 68 MMHG | TEMPERATURE: 98.7 F

## 2024-07-01 LAB
ERYTHROCYTE [DISTWIDTH] IN BLOOD BY AUTOMATED COUNT: 32 % (ref 11.6–15.1)
GLUCOSE SERPL-MCNC: 89 MG/DL (ref 65–140)
HCT VFR BLD AUTO: 26.8 % (ref 34.8–46.1)
HGB BLD-MCNC: 7.6 G/DL (ref 11.5–15.4)
MCH RBC QN AUTO: 19.4 PG (ref 26.8–34.3)
MCHC RBC AUTO-ENTMCNC: 28.4 G/DL (ref 31.4–37.4)
MCV RBC AUTO: 68 FL (ref 82–98)
PLATELET # BLD AUTO: 477 THOUSANDS/UL (ref 149–390)
RBC # BLD AUTO: 3.92 MILLION/UL (ref 3.81–5.12)
WBC # BLD AUTO: 8.04 THOUSAND/UL (ref 4.31–10.16)

## 2024-07-01 PROCEDURE — 85027 COMPLETE CBC AUTOMATED: CPT | Performed by: STUDENT IN AN ORGANIZED HEALTH CARE EDUCATION/TRAINING PROGRAM

## 2024-07-01 PROCEDURE — 99239 HOSP IP/OBS DSCHRG MGMT >30: CPT | Performed by: HOSPITALIST

## 2024-07-01 PROCEDURE — 82948 REAGENT STRIP/BLOOD GLUCOSE: CPT

## 2024-07-01 RX ORDER — FERROUS GLUCONATE 324(38)MG
324 TABLET ORAL
Qty: 30 TABLET | Refills: 1 | Status: SHIPPED | OUTPATIENT
Start: 2024-07-02

## 2024-07-01 RX ADMIN — CYANOCOBALAMIN 1000 MCG: 1000 INJECTION, SOLUTION INTRAMUSCULAR at 08:48

## 2024-07-01 RX ADMIN — FERROUS GLUCONATE 324 MG: 324 TABLET ORAL at 08:48

## 2024-07-01 RX ADMIN — FOLIC ACID 1 MG: 1 TABLET ORAL at 08:48

## 2024-07-01 RX ADMIN — IRON SUCROSE 200 MG: 20 INJECTION, SOLUTION INTRAVENOUS at 08:48

## 2024-07-01 NOTE — PLAN OF CARE
Problem: PAIN - ADULT  Goal: Verbalizes/displays adequate comfort level or baseline comfort level  Description: Interventions:  - Encourage patient to monitor pain and request assistance  - Assess pain using appropriate pain scale  - Administer analgesics based on type and severity of pain and evaluate response  - Implement non-pharmacological measures as appropriate and evaluate response  - Consider cultural and social influences on pain and pain management  - Notify physician/advanced practitioner if interventions unsuccessful or patient reports new pain  Outcome: Completed     Problem: INFECTION - ADULT  Goal: Absence or prevention of progression during hospitalization  Description: INTERVENTIONS:  - Assess and monitor for signs and symptoms of infection  - Monitor lab/diagnostic results  - Monitor all insertion sites, i.e. indwelling lines, tubes, and drains  - Monitor endotracheal if appropriate and nasal secretions for changes in amount and color  - [place] appropriate cooling/warming therapies per order  - Administer medications as ordered  - Instruct and encourage patient and family to use good hand hygiene technique  - Identify and instruct in appropriate isolation precautions for identified infection/condition  Outcome: Completed  Goal: Absence of fever/infection during neutropenic period  Description: INTERVENTIONS:  - Monitor WBC    Outcome: Completed     Problem: SAFETY ADULT  Goal: Patient will remain free of falls  Description: INTERVENTIONS:  - Educate patient/family on patient safety including physical limitations  - Instruct patient to call for assistance with activity   - Consult OT/PT to assist with strengthening/mobility   - Keep Call bell within reach  - Keep bed low and locked with side rails adjusted as appropriate  - Keep care items and personal belongings within reach  - Initiate and maintain comfort rounds  - Make Fall Risk Sign visible to staff  - Offer Toileting every  Hours, in  advance of need  - Initiate/Maintain alarm  - Obtain necessary fall risk management equipment:   - Apply yellow socks and bracelet for high fall risk patients  - Consider moving patient to room near nurses station  Outcome: Completed  Goal: Maintain or return to baseline ADL function  Description: INTERVENTIONS:  -  Assess patient's ability to carry out ADLs; assess patient's baseline for ADL function and identify physical deficits which impact ability to perform ADLs (bathing, care of mouth/teeth, toileting, grooming, dressing, etc.)  - Assess/evaluate cause of self-care deficits   - Assess range of motion  - Assess patient's mobility; develop plan if impaired  - Assess patient's need for assistive devices and provide as appropriate  - Encourage maximum independence but intervene and supervise when necessary  - Involve family in performance of ADLs  - Assess for home care needs following discharge   - Consider OT consult to assist with ADL evaluation and planning for discharge  - Provide patient education as appropriate  Outcome: Completed  Goal: Maintains/Returns to pre admission functional level  Description: INTERVENTIONS:  - Perform AM-PAC 6 Click Basic Mobility/ Daily Activity assessment daily.  - Set and communicate daily mobility goal to care team and patient/family/caregiver.   - Collaborate with rehabilitation services on mobility goals if consulted  - Perform Range of Motion  times a day.  - Reposition patient every  hours.  - Dangle patient  times a day  - Stand patient  times a day  - Ambulate patient  times a day  - Out of bed to chair  times a day   - Out of bed for meals  times a day  - Out of bed for toileting  - Record patient progress and toleration of activity level   Outcome: Completed     Problem: DISCHARGE PLANNING  Goal: Discharge to home or other facility with appropriate resources  Description: INTERVENTIONS:  - Identify barriers to discharge w/patient and caregiver  - Arrange for needed  discharge resources and transportation as appropriate  - Identify discharge learning needs (meds, wound care, etc.)  - Arrange for interpretive services to assist at discharge as needed  - Refer to Case Management Department for coordinating discharge planning if the patient needs post-hospital services based on physician/advanced practitioner order or complex needs related to functional status, cognitive ability, or social support system  Outcome: Completed     Problem: Knowledge Deficit  Goal: Patient/family/caregiver demonstrates understanding of disease process, treatment plan, medications, and discharge instructions  Description: Complete learning assessment and assess knowledge base.  Interventions:  - Provide teaching at level of understanding  - Provide teaching via preferred learning methods  Outcome: Completed     Problem: CARDIOVASCULAR - ADULT  Goal: Maintains optimal cardiac output and hemodynamic stability  Description: INTERVENTIONS:  - Monitor I/O, vital signs and rhythm  - Monitor for S/S and trends of decreased cardiac output  - Administer and titrate ordered vasoactive medications to optimize hemodynamic stability  - Assess quality of pulses, skin color and temperature  - Assess for signs of decreased coronary artery perfusion  - Instruct patient to report change in severity of symptoms  Outcome: Completed  Goal: Absence of cardiac dysrhythmias or at baseline rhythm  Description: INTERVENTIONS:  - Continuous cardiac monitoring, vital signs, obtain 12 lead EKG if ordered  - Administer antiarrhythmic and heart rate control medications as ordered  - Monitor electrolytes and administer replacement therapy as ordered  Outcome: Completed     Problem: RESPIRATORY - ADULT  Goal: Achieves optimal ventilation and oxygenation  Description: INTERVENTIONS:  - Assess for changes in respiratory status  - Assess for changes in mentation and behavior  - Position to facilitate oxygenation and minimize respiratory  effort  - Oxygen administered by appropriate delivery if ordered  - Initiate smoking cessation education as indicated  - Encourage broncho-pulmonary hygiene including cough, deep breathe, Incentive Spirometry  - Assess the need for suctioning and aspirate as needed  - Assess and instruct to report SOB or any respiratory difficulty  - Respiratory Therapy support as indicated  Outcome: Completed     Problem: HEMATOLOGIC - ADULT  Goal: Maintains hematologic stability  Description: INTERVENTIONS  - Assess for signs and symptoms of bleeding or hemorrhage  - Monitor labs  - Administer supportive blood products/factors as ordered and appropriate  Outcome: Completed

## 2024-07-01 NOTE — DISCHARGE SUMMARY
[facility]  Discharge- Tatiana Lopez 1985, 39 y.o. female MRN: [id number]  Unit/Bed#: PPHP 905-01 Encounter: [id number]  Primary Care Provider: No primary care provider on file.   Date and time admitted to hospital: 6/28/2024 11:14 AM    * Anemia  Assessment & Plan  Reports having routine outpatient blood work and was sent for low hemoglobin level  Hemoglobin presentation 3.7.  No prior history of anemia.  She does report intermittent heavy menstrual periods.  Last menstrual period started last Friday and ended this morning, denies passing any clots.  Denies hematuria, hematemesis, rectal bleeding.  Iron studies remarkable for iron deficiency anemia.    Patient seen by heme-onc.  She received 3 doses of IV iron.  Okay to go home on iron supplementation.    I put in referrals for her to see gynecology and hematology in the office and follow-up        Discharging Physician / Practitioner: Carlos De León DO  PCP: No primary care provider on file.  Admission Date:   Admission Orders (From admission, onward)       Ordered        06/28/24 1622  INPATIENT ADMISSION  Once                          Discharge Date: 07/01/24    Medical Problems       Resolved Problems  Date Reviewed: 6/29/2024   None           Consultations During Hospital Stay:  hematology          Reason for Admission: Anemia noted on outpatient labs      Hospital Course:     Tatiana Lopez is a 39 y.o. female patient who originally presented to the hospital on 6/28/2024 due to anemia noted on outpatient labs.  Patient was sent in for severe anemia.  Hemoglobin was found to be 3.7.  She states she has been having heavy menses started a week prior to admission and just ended on the date of admission.  She is found to have microcytic anemia.  She was seen by hematology.  She was transfused with 4 units of packed red blood cells.  Hematology feels this is likely all secondary to the menses but they are  "working her up for other possibilities.  She received 3 doses of IV iron.  She will go home on iron supplementation.  We will refer her to gynecology to see if they can help with the heavy menses in the office.  As well as have her follow-up with hematology in the office for follow-up further workup of other potential causes of anemia.    Please see above list of diagnoses and related plan for additional information.       Condition at Discharge: good       Discharge Day Visit / Exam:     Subjective:  feels well  No vaginal bleeding since admission  No light headedness      Vitals: Blood Pressure: 149/68 (07/01/24 0754)  Pulse: 69 (07/01/24 0754)  Temperature: 98.7 °F (37.1 °C) (07/01/24 0754)  Temp Source: Oral (06/29/24 1937)  Respirations: 18 (07/01/24 0754)  Height: 5' 4\" (162.6 cm) (06/29/24 1937)  Weight - Scale: 89 kg (196 lb 3.4 oz) (06/29/24 1937)  SpO2: 97 % (07/01/24 0754)    Exam:     Physical Exam  Vitals and nursing note reviewed.   HENT:      Head: Normocephalic and atraumatic.   Eyes:      Pupils: Pupils are equal, round, and reactive to light.   Cardiovascular:      Rate and Rhythm: Normal rate and regular rhythm.      Heart sounds: No murmur heard.     No friction rub. No gallop.   Pulmonary:      Effort: Pulmonary effort is normal.      Breath sounds: Normal breath sounds. No wheezing or rales.   Abdominal:      General: Bowel sounds are normal.      Palpations: Abdomen is soft.      Tenderness: There is no abdominal tenderness.   Musculoskeletal:      Right lower leg: No edema.      Left lower leg: No edema.       .         Discharge instructions/Information to patient and family:   See after visit summary for information provided to patient and family.      Provisions for Follow-Up Care:  See after visit summary for information related to follow-up care and any pertinent home health orders.      Disposition:     Home       Discharge Statement:  I spent 36 minutes discharging the patient. This " time was spent on the day of discharge. I had direct contact with the patient on the day of discharge. Greater than 50% of the total time was spent examining patient, answering all patient questions, arranging and discussing plan of care with patient as well as directly providing post-discharge instructions.  Additional time then spent on discharge activities.    Discharge Medications:  See after visit summary for reconciled discharge medications provided to patient and family.      ** Please Note: This note has been constructed using a voice recognition system **

## 2024-07-01 NOTE — TELEPHONE ENCOUNTER
New Patient Intake Form   Patient Details:    Tatiana Lopez  1985    Appointment Information   Who is calling to schedule? Anne-Marie Wolf   If not self, what is the caller's name?   NA   DID YOU CONFIRM INSURANCE WITH PATIENT? E verified, Routed to finance   Referring provider Dr. Payne   What is the diagnosis? Acute microcytic anemia in the setting of heavy menstrual period blood loss and established EDUAR based off of 6/28 labs     Is there a confirmed tissue diagnosis?   NA     Is there a biopsy ordered or pending?  Please specify dates  If yes, route to NN/OCC   NA     Is patient aware of diagnosis?   Yes     Have you had any imaging or labs done?  If yes, where?  (If imaging done outside of [facility], please remind patient to bring a disk.) Yes-[facility]     If imaging done at outside facility, did you instruct patient to obtain discs and bring to visit? NA   Have you been seen by another Oncologist/Hematologist?  If so, who and where? No   Are the records in [facility] or Care Everywhere? Yes   Does the patient have records at another facility/hospital?    If yes, Name of facility, city and state where facility is located. Yes-[facility]     Did you instruct patient to have records faxed to rightx and provide rightfax number?   In Care Everywhere   Preferred [place]   Is the patient willing to be seen by another provider?  (This is for breast patients only) NA     Did you send new patient paperwork?  Email or mail? NA   Miscellaneous Information: The patient is scheduled for her HFU appointment with MARLI Saeed on 8/12 at 0900 in the [place] office.

## 2024-07-02 LAB
ABO GROUP BLD BPU: NORMAL
ABO GROUP BLD BPU: NORMAL
BPU ID: NORMAL
BPU ID: NORMAL
CROSSMATCH: NORMAL
CROSSMATCH: NORMAL
UNIT DISPENSE STATUS: NORMAL
UNIT DISPENSE STATUS: NORMAL
UNIT PRODUCT CODE: NORMAL
UNIT PRODUCT CODE: NORMAL
UNIT PRODUCT VOLUME: 350 ML
UNIT PRODUCT VOLUME: 350 ML
UNIT RH: NORMAL
UNIT RH: NORMAL

## 2024-07-02 NOTE — UTILIZATION REVIEW
NOTIFICATION OF ADMISSION DISCHARGE   This is a Notification of Discharge from [facility]. Please be advised that this patient has been discharge from our facility. Below you will find the admission and discharge date and time including the patient’s disposition.   UTILIZATION REVIEW CONTACT:  Reza Sanford  Utilization   Network Utilization Review Department  Phone: [phone] x carefully listen to the prompts. All voicemails are confidential.  Email: [email or web address]     ADMISSION INFORMATION  PRESENTATION DATE: 6/28/2024 11:14 AM  OBERVATION ADMISSION DATE: N/A  INPATIENT ADMISSION DATE: 6/28/24  4:23 PM   DISCHARGE DATE: 7/1/2024  1:45 PM   DISPOSITION:Home/Self Care    Network Utilization Review Department  ATTENTION: Please call with any questions or concerns to [phone] and carefully listen to the prompts so that you are directed to the right person. All voicemails are confidential.   For Discharge needs, contact Care Management DC Support Team at [phone] opt. 2  Send all requests for admission clinical reviews, approved or denied determinations and any other requests to dedicated fax number below belonging to the campus where the patient is receiving treatment. List of dedicated fax numbers for the Facilities:  FACILITY NAME UR FAX NUMBER   ADMISSION DENIALS (Administrative/Medical Necessity) [phone]   DISCHARGE SUPPORT TEAM ([facility]) [phone]   PARENT CHILD HEALTH (Maternity/NICU/Pediatrics) [phone]   [facility] [phone]   [facility] [phone]   [facility] [phone]   [facility] [phone]   [facility] [phone]   [facility] [phone]   [facility] [phone]   [facility] [phone]   [facility]  [facility] [phone]   [facility] [phone]   [facility] [phone]   [facility] [phone]

## 2024-07-03 LAB — METHYLMALONATE SERPL-SCNC: 173 NMOL/L (ref 0–378)

## 2024-08-23 NOTE — PROGRESS NOTES
Subjective      Tatiana Lopez is a 39 y.o. female who presents for annual GYN exam.     She has intellectual disability.     GYN:  Menses are regular, every month, occasionally skipped menses. Lasting 7 days. +heavy menses, with changing pads every 1-2 hours.   Recent evaluation in ED for menorrhagia, and hemeglobin of 3. She was admitted and given 4UPRBCs and Venofer.  TSH was normal at 3.2;   She reports that she was given oral OCPs, unsure what kind, but caused nausea and vomiting.  No history of bleeding/clotting disorders.  Denies HTN, migraines with auras, liver dysfunction, diabetes  Denies vaginal discharge, labial erythema or lesions, dyspareunia.  Menarche at 14-15 years old.  Patient is virginal.       OB:  OB History    Para Term  AB Living   0 0 0 0 0 0   SAB IAB Ectopic Multiple Live Births   0 0 0 0 0         :  Denies dysuria, urinary frequency or urgency.  Denies hematuria, flank pain, incontinence.    Breast:  Denies breast mass, skin changes, dimpling, reddening, nipple retraction.  Denies breast discharge.  Hx of breast reduction  Patient is unsure of family history of breast, endometrial, colon, or ovarian ca.     Cancer-related family history is not on file.    History reviewed. No pertinent past medical history.    Past Surgical History:   Procedure Laterality Date    ANKLE FRACTURE SURGERY Left     per patient    REDUCTION MAMMAPLASTY Bilateral     SPINE SURGERY N/A     per patient    TONSILECTOMY AND ADNOIDECTOMY Bilateral          General:  Safety: she lives with parents and brother. She feels safe at home.     Social History     Tobacco Use    Smoking status: Never    Smokeless tobacco: Never   Vaping Use    Vaping status: Never Used   Substance Use Topics    Alcohol use: Yes     Comment: occasional    Drug use: Never       Screening:  Cervical cancer: last pap smear in Not on file.     Review of Systems   Constitutional:  Negative for fatigue.   Eyes:  Negative for  "photophobia and visual disturbance.   Respiratory:  Negative for cough and shortness of breath.    Cardiovascular:  Negative for chest pain and palpitations.   Gastrointestinal:  Negative for abdominal pain, blood in stool, constipation, diarrhea, nausea and rectal pain.   Genitourinary:  Positive for menstrual problem. Negative for dyspareunia, dysuria, flank pain, frequency, genital sores, pelvic pain, urgency, vaginal bleeding, vaginal discharge and vaginal pain.   Musculoskeletal:  Negative for arthralgias and back pain.   Skin:  Negative for rash.   Neurological:  Negative for weakness and headaches.          Objective      /88 (BP Location: Left arm, Patient Position: Sitting, Cuff Size: Standard)   Ht 5' 4\" (1.626 m)   Wt 84.8 kg (187 lb)   LMP 08/07/2024 (Exact Date)   BMI 32.10 kg/m²   Physical Exam  Vitals and nursing note reviewed. Exam conducted with a chaperone present (Rukhsana Glasgow MA).   Constitutional:       General: She is not in acute distress.     Appearance: Normal appearance.   HENT:      Head: Normocephalic.   Eyes:      Conjunctiva/sclera: Conjunctivae normal.   Cardiovascular:      Rate and Rhythm: Normal rate and regular rhythm.      Heart sounds: Normal heart sounds.   Pulmonary:      Effort: Pulmonary effort is normal.      Breath sounds: Normal breath sounds.   Chest:      Comments: Patient declined breast exam.  Abdominal:      General: Abdomen is flat. There is distension.      Palpations: Abdomen is soft. There is no mass.      Tenderness: There is no abdominal tenderness. There is no right CVA tenderness or left CVA tenderness.   Genitourinary:     Exam position: Lithotomy position.      Pubic Area: No rash or pubic lice.       Labia:         Right: No rash or tenderness.         Left: No rash or tenderness.       Urethra: No urethral pain.      Comments: Unable to complete pelvic exam due to patient discomfort. Mild spotting noted at vaginal introitus. "   Musculoskeletal:         General: Normal range of motion.      Cervical back: Normal range of motion.      Right lower leg: No edema.      Left lower leg: No edema.   Lymphadenopathy:      Cervical: No cervical adenopathy.      Lower Body: No right inguinal adenopathy. No left inguinal adenopathy.   Skin:     General: Skin is warm and dry.   Neurological:      Mental Status: She is alert and oriented to person, place, and time.   Psychiatric:         Mood and Affect: Mood normal.         Behavior: Behavior normal.         Thought Content: Thought content normal.         Judgment: Judgment normal.                 Assessment/Plan  Problem List Items Addressed This Visit    None  Visit Diagnoses       Well woman exam    -  Primary    Menorrhagia with regular cycle        Relevant Medications    norgestrel-ethinyl estradiol (Cryselle-28) 0.3 mg-30 mcg per tablet    Other Relevant Orders    US pelvis transabdominal only              Menorrhagia: Reviewed recent ED visit. Advised evaluation by US for anatomical anomalies causing menorrhagia (uterine fibroids/polyps). Discussed options to treat menorrhagia, including TXA/Lysteda or hormonal birth control.  Contraceptive options were reviewed, including hormonal methods, both combination (pill, patch, vaginal ring) and progesterone-only (pill, Depo Provera and Nexplanon), intrauterine devices (Mirena, Lissette and Paraguard), barrier methods (condoms, diaphragm) and male/female sterilization.  The mechanisms, risks, benefits and side effects of all methods were discussed.  All questions have been answered to her satisfaction.     Despite previous nausea, patient desires OCP. Encouraged to take with food. Pt aware of importance of taking her pill daily at the same time every day to maximize effectiveness. Pt is aware of risks of estrogen use and higher clotting risks.   Pt aware of possible side effects including, but not limited to, headaches, acne, bloating, irregular  menses, mood changes and breast tenderness.   Pt aware to start with her next menstrual cycle on the fist Sunday of her cycle. Aware of need for back up method of birth control during her first month of pill use.        Cervical cancer screening: unable to obtain  Reviewed healthy lifestyle.  RTO for annual exam or PRN      MARLI Barnhart  OB/GYN  8/26/2024  9:55 AM

## 2024-08-26 ENCOUNTER — OFFICE VISIT (OUTPATIENT)
Dept: OBGYN CLINIC | Facility: CLINIC | Age: 39
End: 2024-08-26
Payer: COMMERCIAL

## 2024-08-26 VITALS
SYSTOLIC BLOOD PRESSURE: 136 MMHG | BODY MASS INDEX: 31.92 KG/M2 | HEIGHT: 64 IN | DIASTOLIC BLOOD PRESSURE: 88 MMHG | WEIGHT: 187 LBS

## 2024-08-26 DIAGNOSIS — Z01.419 WELL WOMAN EXAM: Primary | ICD-10-CM

## 2024-08-26 DIAGNOSIS — N92.0 MENORRHAGIA WITH REGULAR CYCLE: ICD-10-CM

## 2024-08-26 PROBLEM — F81.9 LEARNING DISABILITY: Status: ACTIVE | Noted: 2024-08-26

## 2024-08-26 PROCEDURE — G0101 CA SCREEN;PELVIC/BREAST EXAM: HCPCS

## 2024-08-26 RX ORDER — NORGESTREL-ETHINYL ESTRADIOL 0.3-0.03MG
1 TABLET ORAL DAILY
Qty: 84 TABLET | Refills: 0 | Status: SHIPPED | OUTPATIENT
Start: 2024-08-26

## 2024-09-10 ENCOUNTER — TELEPHONE (OUTPATIENT)
Age: 39
End: 2024-09-10

## 2024-09-10 NOTE — TELEPHONE ENCOUNTER
Patient called, having vision correction surgery. The Ophthalmologist, Dr Coleen Mckeon[facility] [place] requested office notes from 8/26 [facility] for clearance prior to surgery.   Please fax office notes to (f) [phone]

## 2024-09-10 NOTE — TELEPHONE ENCOUNTER
She saw me for menorrhagia - can you please fax the notes to eye provider. However, I do not want to clear her for surgery. It should come from her hematologist due to recent hospital admission for hemoglobin of 3. She is on OCPs now, which should mitigate her bleeding.

## 2024-09-10 NOTE — TELEPHONE ENCOUNTER
Patient's PCP Dr. Desir calling with updates regarding patient. States previously ordered pelvic ultrasound has been rescheduled to 9/19 as patient was to have eye surgery on 9/6 when ultrasound was previously scheduled. He states they then did not go through with the surgery and although it is not clear why he believes it was because she was still having vaginal bleeding, though improved. He states they Ophthalmologist will not reschedule her eye surgery until she is cleared by OBGYN.     He is additionally requesting appointment note from patient's visit with Wendie CALLES on 8/26.     Message to office clerical pool

## 2024-09-10 NOTE — TELEPHONE ENCOUNTER
Office notes faxed to requested number.    Outgoing call to patient. Informed of providers response. Patient verbalized understanding. Patient is seeing hematologist tomorrow, 9/11.

## 2024-09-11 ENCOUNTER — OFFICE VISIT (OUTPATIENT)
Dept: HEMATOLOGY ONCOLOGY | Facility: CLINIC | Age: 39
End: 2024-09-11
Payer: COMMERCIAL

## 2024-09-11 ENCOUNTER — APPOINTMENT (OUTPATIENT)
Dept: LAB | Facility: CLINIC | Age: 39
End: 2024-09-11
Payer: COMMERCIAL

## 2024-09-11 VITALS
TEMPERATURE: 97.6 F | RESPIRATION RATE: 17 BRPM | HEIGHT: 64 IN | HEART RATE: 90 BPM | DIASTOLIC BLOOD PRESSURE: 80 MMHG | BODY MASS INDEX: 30.56 KG/M2 | WEIGHT: 179 LBS | OXYGEN SATURATION: 97 % | SYSTOLIC BLOOD PRESSURE: 138 MMHG

## 2024-09-11 DIAGNOSIS — E53.8 B12 DEFICIENCY: ICD-10-CM

## 2024-09-11 DIAGNOSIS — E53.8 LOW FOLATE: ICD-10-CM

## 2024-09-11 DIAGNOSIS — D50.0 IRON DEFICIENCY ANEMIA DUE TO CHRONIC BLOOD LOSS: ICD-10-CM

## 2024-09-11 DIAGNOSIS — D50.0 IRON DEFICIENCY ANEMIA DUE TO CHRONIC BLOOD LOSS: Primary | ICD-10-CM

## 2024-09-11 LAB
ALBUMIN SERPL BCG-MCNC: 3.7 G/DL (ref 3.5–5)
ALP SERPL-CCNC: 51 U/L (ref 34–104)
ALT SERPL W P-5'-P-CCNC: 18 U/L (ref 7–52)
ANION GAP SERPL CALCULATED.3IONS-SCNC: 7 MMOL/L (ref 4–13)
AST SERPL W P-5'-P-CCNC: 28 U/L (ref 13–39)
BASOPHILS # BLD AUTO: 0.05 THOUSANDS/ΜL (ref 0–0.1)
BASOPHILS NFR BLD AUTO: 0 % (ref 0–1)
BILIRUB SERPL-MCNC: 0.45 MG/DL (ref 0.2–1)
BUN SERPL-MCNC: 10 MG/DL (ref 5–25)
CALCIUM SERPL-MCNC: 8.8 MG/DL (ref 8.4–10.2)
CHLORIDE SERPL-SCNC: 103 MMOL/L (ref 96–108)
CO2 SERPL-SCNC: 27 MMOL/L (ref 21–32)
CREAT SERPL-MCNC: 0.55 MG/DL (ref 0.6–1.3)
EOSINOPHIL # BLD AUTO: 0.02 THOUSAND/ΜL (ref 0–0.61)
EOSINOPHIL NFR BLD AUTO: 0 % (ref 0–6)
ERYTHROCYTE [DISTWIDTH] IN BLOOD BY AUTOMATED COUNT: 20.9 % (ref 11.6–15.1)
FERRITIN SERPL-MCNC: 55 NG/ML (ref 11–307)
FOLATE SERPL-MCNC: 7.4 NG/ML
GFR SERPL CREATININE-BSD FRML MDRD: 118 ML/MIN/1.73SQ M
GLUCOSE P FAST SERPL-MCNC: 95 MG/DL (ref 65–99)
HCT VFR BLD AUTO: 24.1 % (ref 34.8–46.1)
HGB BLD-MCNC: 7 G/DL (ref 11.5–15.4)
IMM GRANULOCYTES # BLD AUTO: 0.13 THOUSAND/UL (ref 0–0.2)
IMM GRANULOCYTES NFR BLD AUTO: 1 % (ref 0–2)
IRON SERPL-MCNC: <10 UG/DL (ref 50–212)
LYMPHOCYTES # BLD AUTO: 1.25 THOUSANDS/ΜL (ref 0.6–4.47)
LYMPHOCYTES NFR BLD AUTO: 11 % (ref 14–44)
MCH RBC QN AUTO: 23.1 PG (ref 26.8–34.3)
MCHC RBC AUTO-ENTMCNC: 29 G/DL (ref 31.4–37.4)
MCV RBC AUTO: 80 FL (ref 82–98)
MONOCYTES # BLD AUTO: 1.21 THOUSAND/ΜL (ref 0.17–1.22)
MONOCYTES NFR BLD AUTO: 11 % (ref 4–12)
NEUTROPHILS # BLD AUTO: 8.85 THOUSANDS/ΜL (ref 1.85–7.62)
NEUTS SEG NFR BLD AUTO: 77 % (ref 43–75)
NRBC BLD AUTO-RTO: 0 /100 WBCS
PLATELET # BLD AUTO: 364 THOUSANDS/UL (ref 149–390)
PMV BLD AUTO: 10.4 FL (ref 8.9–12.7)
POTASSIUM SERPL-SCNC: 4.4 MMOL/L (ref 3.5–5.3)
PROT SERPL-MCNC: 7 G/DL (ref 6.4–8.4)
RBC # BLD AUTO: 3.03 MILLION/UL (ref 3.81–5.12)
SODIUM SERPL-SCNC: 137 MMOL/L (ref 135–147)
UIBC SERPL-MCNC: 293 UG/DL (ref 155–355)
VIT B12 SERPL-MCNC: 480 PG/ML (ref 180–914)
WBC # BLD AUTO: 11.51 THOUSAND/UL (ref 4.31–10.16)

## 2024-09-11 PROCEDURE — 82607 VITAMIN B-12: CPT

## 2024-09-11 PROCEDURE — 80053 COMPREHEN METABOLIC PANEL: CPT

## 2024-09-11 PROCEDURE — 82746 ASSAY OF FOLIC ACID SERUM: CPT

## 2024-09-11 PROCEDURE — 83540 ASSAY OF IRON: CPT

## 2024-09-11 PROCEDURE — 82728 ASSAY OF FERRITIN: CPT

## 2024-09-11 PROCEDURE — 83550 IRON BINDING TEST: CPT

## 2024-09-11 PROCEDURE — 99204 OFFICE O/P NEW MOD 45 MIN: CPT | Performed by: NURSE PRACTITIONER

## 2024-09-11 PROCEDURE — 36415 COLL VENOUS BLD VENIPUNCTURE: CPT

## 2024-09-11 PROCEDURE — 85025 COMPLETE CBC W/AUTO DIFF WBC: CPT

## 2024-09-11 NOTE — PROGRESS NOTES
[facility] HEMATOLOGY ONCOLOGY SPECIALISTS [place]  [address] [id number]  [phone]  Hematology Ambulatory Consult  Tatiana Lopez, 1985, [id number]  9/11/2024      Assessment and Plan   1. Iron deficiency anemia due to chronic blood loss  2. B12 deficiency  3. Low folate    Patient is a 39-year-old female with a history of learning disability however no other significant past medical history who was initially seen by hematology during hospitalization on 6/29/2024.  Patient had gotten outpatient blood work and found to have a hemoglobin of 3.7.  She was sent to the ER for further evaluation.  Anemia was felt to be secondary to acute menstrual blood loss.  Patient has a history of heavy periods.  She also follows with gynecology.  Patient received 4 units PRBCs and Venofer 300 mg x 3 doses.  She was discharged on oral iron.  She is here for hospital follow-up.   Hemoglobin improved to 7.6 on day of discharge, RBC count 3.92, MCV 68, normal WBCs, platelets 477.  No repeat CBC has been done since discharge.  Iron saturation was 2% with a ferritin level of 2, vitamin B12 level was 207, folate 7.2.  Hepatic function panel showed an alkaline phosphatase of 30 AST 12, ALT 5 normal T. bili, normal haptoglobin.  Methylmalonic acid was 173.  We will request updated labs and manage as indicated.  I suspect patient will need an iron infusion.  She tolerated the infusions without difficulty in the past.  I reviewed indications and adverse reactions including shortness of breath, chest heaviness, muscle cramping, headache, itching, nausea; agrees to proceed with treatment.   Patient also needs a surgical clearance for vision correction surgery with ophthalmologist Dr. Coleen Mckeon/eye care in [place].  For follow-up I will have her see one of our physicians so they can determine surgical clearance.  Patient had her father who was on the phone verbalized understanding and  agrees to the plan.    - CBC and differential; Future  - Folate; Future  - Vitamin B12; Future  - Iron Panel (Includes Ferritin, Iron Sat%, Iron, and TIBC); Future  - Comprehensive metabolic panel; Future    Patient and her father verbalized understanding and is in agreement to the plan. Patient knows to call the Hematology/Oncology office with any questions and concerns regarding the above.    Barrier(s) to care: None.   The patient is able to self care.    Alfreda CALLES  Medical Oncology/Hematology  [facility]    Subjective     Chief Complaint   Patient presents with    Hospital Follow-up     Referring provider    Carlos De León, [address] [address]    History of present illness:   Patient is a 39-year-old female with a history of learning disability however no other significant past medical history who was initially seen by hematology during hospitalization on 6/29/2024.  Patient had gotten outpatient blood work and found to have a hemoglobin of 3.7.  She was sent to the ER for further evaluation.  Anemia was felt to be secondary to acute menstrual blood loss.  Patient received 4 units PRBCs and Venofer 300 mg x 3 doses.  She was discharged on oral iron.  She is here for hospital follow-up.    09/11/24: Clinically stable    Review of Systems   Constitutional:  Negative for activity change, appetite change, fatigue, fever and unexpected weight change.   Respiratory:  Negative for cough and shortness of breath.    Cardiovascular:  Negative for chest pain and leg swelling.   Gastrointestinal:  Negative for abdominal pain, constipation, diarrhea and nausea.   Endocrine: Negative for cold intolerance and heat intolerance.   Genitourinary:  Positive for menstrual problem (heavy periods).   Musculoskeletal:  Positive for myalgias. Negative for arthralgias.   Skin: Negative.    Neurological:  Positive for headaches. Negative for dizziness and weakness.    Hematological:  Negative for adenopathy. Does not bruise/bleed easily.       No past medical history on file.  Past Surgical History:   Procedure Laterality Date    ANKLE FRACTURE SURGERY Left     per patient    REDUCTION MAMMAPLASTY Bilateral     SPINE SURGERY N/A     per patient    TONSILECTOMY AND ADNOIDECTOMY Bilateral      No family history on file.  Social History     Socioeconomic History    Marital status: Single     Spouse name: Not on file    Number of children: Not on file    Years of education: Not on file    Highest education level: Not on file   Occupational History    Not on file   Tobacco Use    Smoking status: Never    Smokeless tobacco: Never   Vaping Use    Vaping status: Never Used   Substance and Sexual Activity    Alcohol use: Yes     Comment: occasional    Drug use: Never    Sexual activity: Never   Other Topics Concern    Not on file   Social History Narrative    Not on file     Social Determinants of Health     Financial Resource Strain: Not on file   Food Insecurity: No Food Insecurity (6/29/2024)    Hunger Vital Sign     Worried About Running Out of Food in the Last Year: Never true     Ran Out of Food in the Last Year: Never true   Transportation Needs: No Transportation Needs (6/29/2024)    PRAPARE - Transportation     Lack of Transportation (Medical): No     Lack of Transportation (Non-Medical): No   Physical Activity: Not on file   Stress: Not on file   Social Connections: Not on file   Intimate Partner Violence: Not on file   Housing Stability: Unknown (6/29/2024)    Housing Stability Vital Sign     Unable to Pay for Housing in the Last Year: No     Number of Times Moved in the Last Year: Not on file     Homeless in the Last Year: No     Current Outpatient Medications:     ferrous gluconate (FERGON) 324 mg tablet, Take 1 tablet (324 mg total) by mouth daily with breakfast, Disp: 30 tablet, Rfl: 1    norgestrel-ethinyl estradiol (Cryselle-28) 0.3 mg-30 mcg per tablet, Take 1 tablet  "by mouth daily (Patient not taking: Reported on 9/11/2024), Disp: 84 tablet, Rfl: 0  Allergies   Allergen Reactions    Ceclor [Cefaclor]      Objective   /80 (BP Location: Left arm, Patient Position: Sitting, Cuff Size: Standard)   Pulse 90   Temp 97.6 °F (36.4 °C)   Resp 17   Ht 5' 4\" (1.626 m)   Wt 81.2 kg (179 lb)   LMP 08/07/2024 (Exact Date)   SpO2 97%   BMI 30.73 kg/m²   Physical Exam  Vitals reviewed.   Constitutional:       Appearance: Normal appearance. She is well-developed.   HENT:      Head: Normocephalic and atraumatic.   Eyes:      Conjunctiva/sclera: Conjunctivae normal.      Pupils: Pupils are equal, round, and reactive to light.   Pulmonary:      Effort: Pulmonary effort is normal. No respiratory distress.   Musculoskeletal:         General: Normal range of motion.      Cervical back: Normal range of motion.   Lymphadenopathy:      Cervical: No cervical adenopathy.   Skin:     General: Skin is dry.   Neurological:      Mental Status: She is alert and oriented to person, place, and time.   Psychiatric:         Behavior: Behavior normal.     Result Review  Labs:  Lab Results   Component Value Date    WBC 8.04 07/01/2024    HGB 7.6 (L) 07/01/2024    HCT 26.8 (L) 07/01/2024    MCV 68 (L) 07/01/2024     (H) 07/01/2024     Lab Results   Component Value Date    SODIUM 139 06/30/2024    K 3.3 (L) 06/30/2024     06/30/2024    CO2 25 06/30/2024    AGAP 7 06/30/2024    BUN 5 06/30/2024    CREATININE 0.48 (L) 06/30/2024    GLUC 75 06/30/2024    CALCIUM 8.7 06/30/2024    AST 12 (L) 06/28/2024    ALT 5 (L) 06/28/2024    ALKPHOS 30 (L) 06/28/2024    TP 6.6 06/28/2024    TBILI 0.31 06/28/2024    EGFR 123 06/30/2024     Imaging:   No results found.    Please note:  This report has been generated by a voice recognition software system. Therefore there may be syntax, spelling, and/or grammatical errors. Please call if you have any questions.  "

## 2024-09-12 ENCOUNTER — TELEPHONE (OUTPATIENT)
Dept: HEMATOLOGY ONCOLOGY | Facility: CLINIC | Age: 39
End: 2024-09-12

## 2024-09-12 DIAGNOSIS — D50.9 IRON DEFICIENCY ANEMIA, UNSPECIFIED IRON DEFICIENCY ANEMIA TYPE: ICD-10-CM

## 2024-09-12 DIAGNOSIS — D50.0 IRON DEFICIENCY ANEMIA DUE TO CHRONIC BLOOD LOSS: Primary | ICD-10-CM

## 2024-09-12 DIAGNOSIS — E53.8 LOW FOLATE: ICD-10-CM

## 2024-09-12 RX ORDER — SODIUM CHLORIDE 9 MG/ML
20 INJECTION, SOLUTION INTRAVENOUS ONCE
Status: CANCELLED | OUTPATIENT
Start: 2024-09-18

## 2024-09-12 NOTE — TELEPHONE ENCOUNTER
Called and left message for patient to review labs.  I also called patient's father and reviewed labs from yesterday.  Hemoglobin is 7.0.  We will make arrangements for an iron infusion.  I also would like to get a blood consent on file in case we need to transfuse.  Patient's father verbalized understanding and agrees to the plan.    11:03 I called and spoke with patient to review lab results, hemoglobin 7.0, iron saturation unable to be calculated, ferritin level 55.  We discussed iron infusions and will make arrangements for Venofer 300 mg IV x 3 to start as soon as possible.  I reviewed side effects including chest heaviness, muscle cramping, headache, itching, nausea.  I also obtained blood consent from patient.  We reviewed risks and side effects.  For now we will trend CBC and transfuse if hemoglobin less than 7.0.  Patient is currently not symptomatic.  Patient verbalized understanding and agrees with plan.

## 2024-09-17 ENCOUNTER — APPOINTMENT (OUTPATIENT)
Dept: LAB | Facility: CLINIC | Age: 39
End: 2024-09-17
Payer: COMMERCIAL

## 2024-09-17 ENCOUNTER — TELEPHONE (OUTPATIENT)
Age: 39
End: 2024-09-17

## 2024-09-17 DIAGNOSIS — D50.9 IRON DEFICIENCY ANEMIA, UNSPECIFIED IRON DEFICIENCY ANEMIA TYPE: ICD-10-CM

## 2024-09-17 DIAGNOSIS — D50.0 IRON DEFICIENCY ANEMIA DUE TO CHRONIC BLOOD LOSS: ICD-10-CM

## 2024-09-17 DIAGNOSIS — E53.8 LOW FOLATE: ICD-10-CM

## 2024-09-17 LAB
ERYTHROCYTE [DISTWIDTH] IN BLOOD BY AUTOMATED COUNT: 20.7 % (ref 11.6–15.1)
HCT VFR BLD AUTO: 24.7 % (ref 34.8–46.1)
HGB BLD-MCNC: 7.1 G/DL (ref 11.5–15.4)
MCH RBC QN AUTO: 22.8 PG (ref 26.8–34.3)
MCHC RBC AUTO-ENTMCNC: 28.7 G/DL (ref 31.4–37.4)
MCV RBC AUTO: 79 FL (ref 82–98)
PLATELET # BLD AUTO: 585 THOUSANDS/UL (ref 149–390)
PMV BLD AUTO: 9.7 FL (ref 8.9–12.7)
RBC # BLD AUTO: 3.11 MILLION/UL (ref 3.81–5.12)
WBC # BLD AUTO: 6.52 THOUSAND/UL (ref 4.31–10.16)

## 2024-09-17 PROCEDURE — 85027 COMPLETE CBC AUTOMATED: CPT

## 2024-09-17 PROCEDURE — 36415 COLL VENOUS BLD VENIPUNCTURE: CPT

## 2024-09-17 NOTE — TELEPHONE ENCOUNTER
Call received from lab. Questioning if patient needed an Iron panel drawn today. Informed lab that patient needs a CBC and blood bank hold tube drawn today. Verbalized understanding.

## 2024-09-20 ENCOUNTER — HOSPITAL ENCOUNTER (OUTPATIENT)
Dept: INFUSION CENTER | Facility: CLINIC | Age: 39
End: 2024-09-20
Payer: COMMERCIAL

## 2024-09-20 VITALS
HEART RATE: 68 BPM | RESPIRATION RATE: 18 BRPM | DIASTOLIC BLOOD PRESSURE: 81 MMHG | TEMPERATURE: 97.6 F | SYSTOLIC BLOOD PRESSURE: 151 MMHG

## 2024-09-20 DIAGNOSIS — D50.9 IRON DEFICIENCY ANEMIA, UNSPECIFIED IRON DEFICIENCY ANEMIA TYPE: Primary | ICD-10-CM

## 2024-09-20 DIAGNOSIS — D50.0 IRON DEFICIENCY ANEMIA DUE TO CHRONIC BLOOD LOSS: ICD-10-CM

## 2024-09-20 PROCEDURE — 96365 THER/PROPH/DIAG IV INF INIT: CPT

## 2024-09-20 RX ORDER — SODIUM CHLORIDE 9 MG/ML
20 INJECTION, SOLUTION INTRAVENOUS ONCE
Status: COMPLETED | OUTPATIENT
Start: 2024-09-20 | End: 2024-09-20

## 2024-09-20 RX ORDER — SODIUM CHLORIDE 9 MG/ML
20 INJECTION, SOLUTION INTRAVENOUS ONCE
Status: CANCELLED | OUTPATIENT
Start: 2024-09-27

## 2024-09-20 RX ADMIN — IRON SUCROSE 300 MG: 20 INJECTION, SOLUTION INTRAVENOUS at 14:40

## 2024-09-20 RX ADMIN — SODIUM CHLORIDE 20 ML/HR: 0.9 INJECTION, SOLUTION INTRAVENOUS at 14:34

## 2024-09-20 NOTE — PROGRESS NOTES
Tatiana Lopez  tolerated Venofer therapy well with no complications.      Tatiana Lopez is aware of future appt on Friday Sep 27, 2024 2:00 PM.     AVS printed and given to Tatiana Lopez.

## 2024-09-24 ENCOUNTER — HOSPITAL ENCOUNTER (OUTPATIENT)
Dept: RADIOLOGY | Facility: HOSPITAL | Age: 39
Discharge: HOME | End: 2024-09-24
Payer: COMMERCIAL

## 2024-09-24 DIAGNOSIS — R19.09 OTHER INTRA-ABDOMINAL AND PELVIC SWELLING, MASS AND LUMP: ICD-10-CM

## 2024-09-24 DIAGNOSIS — N92.0 MENORRHAGIA WITH REGULAR CYCLE: ICD-10-CM

## 2024-09-24 PROCEDURE — 76705 ECHO EXAM OF ABDOMEN: CPT

## 2024-09-24 PROCEDURE — 76856 US EXAM PELVIC COMPLETE: CPT

## 2024-09-27 ENCOUNTER — HOSPITAL ENCOUNTER (OUTPATIENT)
Dept: INFUSION CENTER | Facility: CLINIC | Age: 39
End: 2024-09-27
Payer: COMMERCIAL

## 2024-09-27 VITALS
HEART RATE: 71 BPM | SYSTOLIC BLOOD PRESSURE: 146 MMHG | RESPIRATION RATE: 18 BRPM | TEMPERATURE: 96.7 F | DIASTOLIC BLOOD PRESSURE: 88 MMHG

## 2024-09-27 DIAGNOSIS — E53.8 LOW FOLATE: ICD-10-CM

## 2024-09-27 DIAGNOSIS — D50.0 IRON DEFICIENCY ANEMIA DUE TO CHRONIC BLOOD LOSS: ICD-10-CM

## 2024-09-27 DIAGNOSIS — D50.9 IRON DEFICIENCY ANEMIA, UNSPECIFIED IRON DEFICIENCY ANEMIA TYPE: Primary | ICD-10-CM

## 2024-09-27 LAB
ERYTHROCYTE [DISTWIDTH] IN BLOOD BY AUTOMATED COUNT: 21.2 % (ref 11.6–15.1)
HCT VFR BLD AUTO: 26.5 % (ref 34.8–46.1)
HGB BLD-MCNC: 7.6 G/DL (ref 11.5–15.4)
MCH RBC QN AUTO: 21.9 PG (ref 26.8–34.3)
MCHC RBC AUTO-ENTMCNC: 28.7 G/DL (ref 31.4–37.4)
MCV RBC AUTO: 76 FL (ref 82–98)
PLATELET # BLD AUTO: 507 THOUSANDS/UL (ref 149–390)
PMV BLD AUTO: 10 FL (ref 8.9–12.7)
RBC # BLD AUTO: 3.47 MILLION/UL (ref 3.81–5.12)
WBC # BLD AUTO: 5.21 THOUSAND/UL (ref 4.31–10.16)

## 2024-09-27 PROCEDURE — 85027 COMPLETE CBC AUTOMATED: CPT

## 2024-09-27 PROCEDURE — 96366 THER/PROPH/DIAG IV INF ADDON: CPT

## 2024-09-27 PROCEDURE — 96365 THER/PROPH/DIAG IV INF INIT: CPT

## 2024-09-27 RX ORDER — SODIUM CHLORIDE 9 MG/ML
20 INJECTION, SOLUTION INTRAVENOUS ONCE
Status: COMPLETED | OUTPATIENT
Start: 2024-09-27 | End: 2024-09-27

## 2024-09-27 RX ORDER — SODIUM CHLORIDE 9 MG/ML
20 INJECTION, SOLUTION INTRAVENOUS ONCE
OUTPATIENT
Start: 2024-10-04

## 2024-09-27 RX ADMIN — IRON SUCROSE 300 MG: 20 INJECTION, SOLUTION INTRAVENOUS at 14:38

## 2024-09-27 RX ADMIN — SODIUM CHLORIDE 20 ML/HR: 0.9 INJECTION, SOLUTION INTRAVENOUS at 14:39

## 2024-09-27 NOTE — PROGRESS NOTES
Tatiana Lopez  tolerated venofer therapy well with no complications.  CBC monitoring noted from note, and patient states she has had a lot going on recently and forgot to get her CBC checked this week and last week. This week's cbc drawn prior to therapy, and patient states she will remember to get it next week.    Tatiana Lopez is aware of future appt on Friday Oct 4, 2024 2:00 PM.     AVS printed and given to Tatiana Lopez.

## 2024-09-30 ENCOUNTER — TELEPHONE (OUTPATIENT)
Dept: OBGYN CLINIC | Facility: CLINIC | Age: 39
End: 2024-09-30

## 2024-09-30 NOTE — TELEPHONE ENCOUNTER
Called patient and spoke with her and father (Emily) on phone regarding US results indicating large uterine fibroids.     Reviewed the uterine fibroids can contribute to excessive vaginal bleeding. She is not currently taking CINDY prescribed to her. I would recommend restarting to try to control menses until further treatment can be instituted.    Advised to have appt with a physician to discuss further treatment or surgery. I reviewed surgery may be required due to excessive size of the fibroids. Patient and father agreed to appointment on 10/11/24 at 0800 with Dr. Roman. Provided address and directions to the office.

## 2024-09-30 NOTE — PROGRESS NOTES
Ambulatory Visit  Name: Tatiana Lopez      : 1985      MRN: [id number]  Encounter Provider: Luz Zamora MD  Encounter Date: 10/1/2024   Encounter department: [facility] HEMATOLOGY ONCOLOGY SPECIALISTS [place]    Assessment & Plan  Iron deficiency anemia due to chronic blood loss  Source of chronic blood loss: Heavy menses in patient with massive enlargement of uterus caused by bulky uterine fibroids     Patient is 39-year-old -American female with severe iron deficiency anemia due to chronic gynecologic blood loss caused by bulky uterine fibroids and massively enlarged uterus. Her excessive vaginal bleeding during menstrual cycles is the cause of her iron deficiency anemia. On 2024, she re-initiated oral contraceptives for bleeding control prescribed by the Gyn service. Patient may require a hysterectomy to control her heavy menses which have resulted in severe iron deficiency anemia. So far, the patient has required transfusion of 4 units of pRBCs in late 2024 and parenteral plus oral iron replacement therapy with moderate improvement of her severe iron deficiency anemia.    Today I presented detail information about the clinical presentation, causes, management, and prognosis of iron deficiency anemia caused by chronic blood loss.  Patient has chronic, severe gynecologic blood loss during menstrual cycles due to a massively enlarged uterus with bulky uterine fibroids.    She was advised to initiate oral contraceptive agents on 2024 to improve heavy menses and to lessen her severe, chronic blood loss.  In addition, she will have outpatient assessment by Dr. Roman on 2024 for consideration of a hysterectomy. In the meantime, the patient will continue oral and parenteral iron replacement therapy and transfusion of pRBCs as needed. However, it is unlikely that the patient may achieve a target hemoglobin of 10 g/dL or higher with oral and parenteral iron  replacement therapy, if she continues to have heavy manses with severe, chronic gynecologic blood loss, caused by a massively enlarged uterus and bulky uterine fibroids.    Plan:  - Outpatient gynecologic evaluation by Dr. Roman to discuss risks and benefits of hysterectomy on 10/11/2024    -Continue oral and parenteral iron replacement therapy.  -Transfusion of pRBCs as needed  Menorrhagia with regular cycle  Patient initiated oral contraceptive agents on September 30, 2024.  She will have outpatient gynecologic evaluation to discuss risks and benefits of hysterectomy on 10/11/2024         Bulky or enlarged uterus  Outpatient gynecologic evaluation to discuss risks and benefits of hysterectomy on 10/11/2024           History of Present Illness     Tatiana Lopez is a 39 y.o. female who presents to hematology oncology clinic for management of severe iron deficiency anemia caused by chronic, severe gynecologic blood loss, related to heavy menses, in a patient with massive enlargement of the uterus due to bulky uterine fibroids.    In summary, patient refers a year history of progressive, heavy menstrual periods.  She refers menstrual periods lasting approximately 5 days which required changing vaginal pads at least every 2 hours.  Frequently, she identifies large blood clots during her menses.  She denies other types of bleeding such as hematemesis, melena, hematochezia, nosebleeds, skin bruises, or hematomas. She does not have prior personal or family history of a bleeding disorder. On June 28, 2024, routine hemoglobin and hematocrit was abnormal, with a hemoglobin was 3.7 g/dL and hematocrit 14.6%. Patient's primary care physician sent her to the emergency department for management of severe anemia. The patient was admitted to the hospital from June 28, 2024 to July 1, 2024.  Workup for heavy menses and severe iron deficiency anemia uncovered a massively enlarged uterus palpable to the upper abdomen. Bulky  uterine fibroids were identified.  There was no clinical or radiographic evidence of malignancy.  During hospitalization, patient received a total of 4 units of packed red blood cells and initiated parenteral iron replacement therapy with iron sucrose (Venofer).  She was discharged from the hospital to continue oral and parenteral iron replacement therapy.  With iron replacement treatment, she has some improvement of her hemoglobin and hematocrit.  Her most recent hemoglobin on September 27, 2024 was 7.6 g/dL.  On September 30, 2024, patient received a call from the GYN clinic.  She was advised to initiate oral contraceptives as a measure to control heavy menses.  In addition, she was advised to have outpatient assessment by Dr. Roman on October 11, 2024 to discuss benefits and risks of hysterectomy.    Today, the patient does not have any new symptoms.  She denies systemic symptoms such as fatigue, weight loss, fever, chills, or night sweats.  She continues to have heavy manses but does not have other types of bleeding.  Patient continues periodic parenteral iron replacement therapy with iron sucrose (Venofer).  Please see below summary of clinical course of severe iron deficiency anemia    Summary of clinical course iron deficiency anemia due to chronic loss  - June 28, 20224, outpatient CBC showed a Hg of 3.7 Gr/dL and hematocrit of 14.6%. Patient was sent to the ED  - June 28 2024 to July 1, 2024 admission to the hospital for management of severe iron deficiency anemia  - June 28, 2024: Ferritin 2 ng/mL  - June 28, 2024 moderate leukopenia and severe microcytic anemia due to severe iron deficiency: white blood cell count 3.95, hemoglobin 3.7 g/dL, hematocrit - 14.6%, MCV 58 FL,-MCH C20 3.9 g/dL RDW 27.8%  - September 24, 20224 US of the abdomen and pelvis. Markedly enlarged fibroid uterus with largest estimated fibroid measuring 17.9 x 13.9 x 27 cm. Uterus extends in close proximity to the liver and felt to  correspond to the palpable abnormality in the left upper abdomen.         Review of Systems   Constitutional:  Negative for activity change, appetite change, chills, diaphoresis, fatigue, fever and unexpected weight change.   HENT:  Negative for congestion, dental problem, ear discharge, ear pain, facial swelling, hearing loss, mouth sores, nosebleeds, postnasal drip, rhinorrhea, sinus pain, sneezing, sore throat, tinnitus, trouble swallowing and voice change.    Eyes:  Negative for photophobia, pain, discharge, redness, itching and visual disturbance.   Respiratory:  Negative for apnea, cough, choking, chest tightness, shortness of breath, wheezing and stridor.    Cardiovascular:  Negative for chest pain, palpitations and leg swelling.   Gastrointestinal:  Negative for abdominal distention, abdominal pain, anal bleeding, blood in stool, constipation, diarrhea, nausea, rectal pain and vomiting.   Endocrine: Negative for cold intolerance and heat intolerance.   Genitourinary:  Positive for vaginal bleeding. Negative for decreased urine volume, difficulty urinating, dysuria, enuresis, flank pain, frequency, hematuria and urgency.   Musculoskeletal:  Negative for arthralgias, back pain, gait problem, joint swelling, myalgias, neck pain and neck stiffness.   Skin:  Negative for color change, pallor, rash and wound.   Neurological:  Negative for dizziness, tremors, seizures, syncope, facial asymmetry, speech difficulty, weakness, light-headedness, numbness and headaches.   Hematological:  Negative for adenopathy. Does not bruise/bleed easily.   Psychiatric/Behavioral:  Negative for behavioral problems, confusion, dysphoric mood and sleep disturbance. The patient is not nervous/anxious.            Objective     LMP  (LMP Unknown)     Physical Exam  Vitals reviewed.   Constitutional:       General: She is not in acute distress.     Appearance: Normal appearance. She is normal weight. She is not toxic-appearing.   HENT:       Head: Normocephalic and atraumatic.      Nose: Nose normal. No congestion.      Mouth/Throat:      Mouth: Mucous membranes are moist.      Pharynx: Oropharynx is clear. No oropharyngeal exudate or posterior oropharyngeal erythema.   Eyes:      General: No scleral icterus.     Conjunctiva/sclera: Conjunctivae normal.      Pupils: Pupils are equal, round, and reactive to light.      Comments: Lateral strabismus of the right eye   Cardiovascular:      Rate and Rhythm: Normal rate and regular rhythm.      Pulses: Normal pulses.      Heart sounds: Normal heart sounds. No murmur heard.     No friction rub. No gallop.   Pulmonary:      Effort: Pulmonary effort is normal. No respiratory distress.      Breath sounds: Normal breath sounds. No stridor. No wheezing, rhonchi or rales.   Chest:      Chest wall: No tenderness.   Abdominal:      General: Bowel sounds are normal. There is distension (Massive enlargement of the uterus, occupying the abdomen up to the right and left upper quadrants.  Palpation of massively enlarged uterus nonpainful).      Palpations: There is mass (Massively an enlarged uterus occupying almost the entire abdomen as described above).      Tenderness: There is no abdominal tenderness. There is no right CVA tenderness, left CVA tenderness, guarding or rebound.      Hernia: No hernia is present.   Musculoskeletal:         General: No swelling, tenderness or deformity. Normal range of motion.      Cervical back: Normal range of motion and neck supple. No rigidity or tenderness.      Right lower leg: No edema.      Left lower leg: No edema.   Lymphadenopathy:      Cervical: No cervical adenopathy.   Skin:     General: Skin is warm and dry.      Capillary Refill: Capillary refill takes less than 2 seconds.      Coloration: Skin is not jaundiced or pale.      Findings: No bruising, erythema, lesion or rash.   Neurological:      General: No focal deficit present.      Mental Status: She is alert and oriented  to person, place, and time. Mental status is at baseline.      Cranial Nerves: No cranial nerve deficit.      Sensory: No sensory deficit.      Motor: No weakness.      Coordination: Coordination normal.      Gait: Gait normal.      Deep Tendon Reflexes: Reflexes normal.   Psychiatric:         Mood and Affect: Mood normal.         Behavior: Behavior normal.         Thought Content: Thought content normal.         Recent CBC Results:     Component      Latest Ref Rng 6/28/2024 6/29/2024 6/30/2024 7/1/2024 9/11/2024 9/17/2024   WBC      4.31 - 10.16 Thousand/uL 3.95 (L)  5.08  8.32  8.04  11.51 (H)  6.52    RBC      3.81 - 5.12 Million/uL 2.81 (L)  3.50 (L)  3.94  3.92  3.03 (L)  3.11 (L)    Hemoglobin      11.5 - 15.4 g/dL 3.7 (LL)  6.4 (L)  7.7 (L)  7.6 (L)  7.0 (L)  7.1 (L)    Hemoglobin       3.9 (LL)  5.7 (LL)        Hematocrit      34.8 - 46.1 % 14.6 (L)  23.1 (L)  26.1 (L)  26.8 (L)  24.1 (L)  24.7 (L)    Hematocrit       16.3 (L)         MCV      82 - 98 fL 58 (L)  66 (L)  66 (L)  68 (L)  80 (L)  79 (L)    MCH      26.8 - 34.3 pg 13.9 (L)  18.3 (L)  19.5 (L)  19.4 (L)  23.1 (L)  22.8 (L)    MCHC      31.4 - 37.4 g/dL 23.9 (L)  27.7 (L)  29.5 (L)  28.4 (L)  29.0 (L)  28.7 (L)    RDW      11.6 - 15.1 % 27.8 (H)  30.9 (H)  30.3 (H)  32.0 (H)  20.9 (H)  20.7 (H)    Platelet Count      149 - 390 Thousands/uL 372  392 (H)  473 (H)  477 (H)  364  585 (H)    MPV      8.9 - 12.7 fL     10.4  9.7      Component      Latest Ref Rn 9/27/2024   WBC      4.31 - 10.16 Thousand/uL 5.21    RBC      3.81 - 5.12 Million/uL 3.47 (L)    Hemoglobin      11.5 - 15.4 g/dL 7.6 (L)    Hematocrit      34.8 - 46.1 % 26.5 (L)    MCV      82 - 98 fL 76 (L)    MCH      26.8 - 34.3 pg 21.9 (L)    MCHC      31.4 - 37.4 g/dL 28.7 (L)    RDW      11.6 - 15.1 % 21.2 (H)    Platelet Count      149 - 390 Thousands/uL 507 (H)    MPV      8.9 - 12.7 fL 10.0

## 2024-09-30 NOTE — ASSESSMENT & PLAN NOTE
Source of chronic blood loss: Heavy menses in patient with massive enlargement of uterus caused by bulky uterine fibroids     Patient is 39-year-old -American female with severe iron deficiency anemia due to chronic gynecologic blood loss caused by bulky uterine fibroids and massively enlarged uterus. Her excessive vaginal bleeding during menstrual cycles is the cause of her iron deficiency anemia. On 09/30/2024, she re-initiated oral contraceptives for bleeding control prescribed by the Gyn service. Patient may require a hysterectomy to control her heavy menses which have resulted in severe iron deficiency anemia. So far, the patient has required transfusion of 4 units of pRBCs in late June 2024 and parenteral plus oral iron replacement therapy with moderate improvement of her severe iron deficiency anemia.    Today I presented detail information about the clinical presentation, causes, management, and prognosis of iron deficiency anemia caused by chronic blood loss.  Patient has chronic, severe gynecologic blood loss during menstrual cycles due to a massively enlarged uterus with bulky uterine fibroids.    She was advised to initiate oral contraceptive agents on September 30, 2024 to improve heavy menses and to lessen her severe, chronic blood loss.  In addition, she will have outpatient assessment by Dr. Roman on October 11, 2024 for consideration of a hysterectomy. In the meantime, the patient will continue oral and parenteral iron replacement therapy and transfusion of pRBCs as needed. However, it is unlikely that the patient may achieve a target hemoglobin of 10 g/dL or higher with oral and parenteral iron replacement therapy, if she continues to have heavy manses with severe, chronic gynecologic blood loss, caused by a massively enlarged uterus and bulky uterine fibroids.    Plan:  - Outpatient gynecologic evaluation by Dr. Roman to discuss risks and benefits of hysterectomy on 10/11/2024     -Continue oral and parenteral iron replacement therapy.  -Transfusion of pRBCs as needed

## 2024-10-01 ENCOUNTER — OFFICE VISIT (OUTPATIENT)
Dept: HEMATOLOGY ONCOLOGY | Facility: CLINIC | Age: 39
End: 2024-10-01
Payer: COMMERCIAL

## 2024-10-01 VITALS
OXYGEN SATURATION: 97 % | SYSTOLIC BLOOD PRESSURE: 156 MMHG | BODY MASS INDEX: 30.56 KG/M2 | WEIGHT: 179 LBS | RESPIRATION RATE: 17 BRPM | TEMPERATURE: 98 F | HEIGHT: 64 IN | HEART RATE: 72 BPM | DIASTOLIC BLOOD PRESSURE: 90 MMHG

## 2024-10-01 DIAGNOSIS — N85.2 BULKY OR ENLARGED UTERUS: ICD-10-CM

## 2024-10-01 DIAGNOSIS — D50.0 IRON DEFICIENCY ANEMIA DUE TO CHRONIC BLOOD LOSS: Primary | ICD-10-CM

## 2024-10-01 DIAGNOSIS — D50.0 ANEMIA DUE TO CHRONIC BLOOD LOSS: ICD-10-CM

## 2024-10-01 DIAGNOSIS — N92.0 MENORRHAGIA WITH REGULAR CYCLE: ICD-10-CM

## 2024-10-01 PROCEDURE — 99204 OFFICE O/P NEW MOD 45 MIN: CPT | Performed by: INTERNAL MEDICINE

## 2024-10-04 ENCOUNTER — HOSPITAL ENCOUNTER (OUTPATIENT)
Dept: INFUSION CENTER | Facility: CLINIC | Age: 39
End: 2024-10-04
Payer: COMMERCIAL

## 2024-10-04 VITALS
RESPIRATION RATE: 18 BRPM | DIASTOLIC BLOOD PRESSURE: 78 MMHG | TEMPERATURE: 97.3 F | SYSTOLIC BLOOD PRESSURE: 126 MMHG | HEART RATE: 76 BPM

## 2024-10-04 DIAGNOSIS — E53.8 LOW FOLATE: ICD-10-CM

## 2024-10-04 DIAGNOSIS — D50.9 IRON DEFICIENCY ANEMIA, UNSPECIFIED IRON DEFICIENCY ANEMIA TYPE: Primary | ICD-10-CM

## 2024-10-04 DIAGNOSIS — D50.0 IRON DEFICIENCY ANEMIA DUE TO CHRONIC BLOOD LOSS: ICD-10-CM

## 2024-10-04 LAB
ERYTHROCYTE [DISTWIDTH] IN BLOOD BY AUTOMATED COUNT: 21.6 % (ref 11.6–15.1)
HCT VFR BLD AUTO: 26.7 % (ref 34.8–46.1)
HGB BLD-MCNC: 7.7 G/DL (ref 11.5–15.4)
MCH RBC QN AUTO: 21.8 PG (ref 26.8–34.3)
MCHC RBC AUTO-ENTMCNC: 28.8 G/DL (ref 31.4–37.4)
MCV RBC AUTO: 76 FL (ref 82–98)
PLATELET # BLD AUTO: 408 THOUSANDS/UL (ref 149–390)
PMV BLD AUTO: 10.4 FL (ref 8.9–12.7)
RBC # BLD AUTO: 3.53 MILLION/UL (ref 3.81–5.12)
WBC # BLD AUTO: 5.64 THOUSAND/UL (ref 4.31–10.16)

## 2024-10-04 PROCEDURE — 96365 THER/PROPH/DIAG IV INF INIT: CPT

## 2024-10-04 PROCEDURE — 96366 THER/PROPH/DIAG IV INF ADDON: CPT

## 2024-10-04 PROCEDURE — 85027 COMPLETE CBC AUTOMATED: CPT

## 2024-10-04 RX ORDER — SODIUM CHLORIDE 9 MG/ML
20 INJECTION, SOLUTION INTRAVENOUS ONCE
Status: CANCELLED | OUTPATIENT
Start: 2024-10-04

## 2024-10-04 RX ORDER — SODIUM CHLORIDE 9 MG/ML
20 INJECTION, SOLUTION INTRAVENOUS ONCE
Status: COMPLETED | OUTPATIENT
Start: 2024-10-04 | End: 2024-10-04

## 2024-10-04 RX ADMIN — SODIUM CHLORIDE 20 ML/HR: 0.9 INJECTION, SOLUTION INTRAVENOUS at 14:27

## 2024-10-04 RX ADMIN — IRON SUCROSE 300 MG: 20 INJECTION, SOLUTION INTRAVENOUS at 14:28

## 2024-10-04 NOTE — PROGRESS NOTES
Patient arrived to the unit and denied complications with previous infusions. Tolerated venofer infusion well without adverse affects. Aware of follow up appointment and to have blood work done.

## 2024-10-29 ENCOUNTER — OFFICE VISIT (OUTPATIENT)
Dept: HEMATOLOGY ONCOLOGY | Facility: CLINIC | Age: 39
End: 2024-10-29
Payer: COMMERCIAL

## 2024-10-29 VITALS
WEIGHT: 181.2 LBS | SYSTOLIC BLOOD PRESSURE: 138 MMHG | DIASTOLIC BLOOD PRESSURE: 78 MMHG | HEIGHT: 64 IN | OXYGEN SATURATION: 98 % | TEMPERATURE: 97.8 F | RESPIRATION RATE: 18 BRPM | HEART RATE: 75 BPM | BODY MASS INDEX: 30.93 KG/M2

## 2024-10-29 DIAGNOSIS — D50.0 IRON DEFICIENCY ANEMIA DUE TO CHRONIC BLOOD LOSS: Primary | ICD-10-CM

## 2024-10-29 PROCEDURE — 99213 OFFICE O/P EST LOW 20 MIN: CPT | Performed by: INTERNAL MEDICINE

## 2024-10-29 NOTE — PROGRESS NOTES
Ambulatory Visit  Name: Tatiana Lopez      : 1985      MRN: [id number]  Encounter Provider: Luz Zamora MD  Encounter Date: 10/29/2024   Encounter department: [facility] [place]    Assessment & Plan  Iron deficiency anemia due to chronic blood loss  Tatiana Lopez is a 39-year-old -American female with a learning disability and severe iron deficiency anemia due to chronic, sever gynecologic blood loss caused by bulky uterine fibroids and massively enlarged uterus. Her excessive vaginal bleeding during menstrual cycles is the cause of her iron deficiency anemia. On 2024, she re-initiated oral contraceptives for bleeding control prescribed by the Gyn service. Patient may require a hysterectomy to control her heavy menses which have resulted in severe iron deficiency anemia. So far, the patient has required transfusion of 4 units of pRBCs in late 2024 and parenteral plus oral iron replacement therapy with moderate improvement of her severe iron deficiency anemia.     She initiated oral contraceptive agents on 2024 to improve heavy menses and to lessen her severe, chronic blood loss. With OCPs, she has noticed improvement of menses with less severe bleeding as compared to the past several months. Her most recent CBC with differential on 10/04/2024 showed microcytic anemia with a hemoglobin of 7.7 g/dL, hematocrit of 26.7%.  On 2024, the patient received an infusion of iron sucrose (Venofer) 300 mg IV. With oral and parenteral iron supplementation, the patient had slow improvement of her severe iron deficiency anemia.    Also, the patient will have outpatient assessment by Dr. Roman on 2024 for consideration of a hysterectomy. In the meantime, the patient will continue oral iron replacement therapy and transfusion of pRBCs as needed. However, it is unlikely that the patient may achieve a target hemoglobin of 10 g/dL or  higher with oral and parenteral iron replacement therapy, if she continues to have heavy manses with severe, chronic gynecologic blood loss, caused by a massively enlarged uterus and bulky uterine fibroids.    Plan:  Outpatient gynecologic evaluation by Dr. Roman to discuss risks and benefits of hysterectomy on 11/04/2024    Continue oral iron replacement therapy with ferrous sulfate 325 mg PO daily  Transfusion of pRBCs as needed  RTC in 2 months                 Orders:    CBC and differential; Future      Patient understands and agrees with my management recommendations and plan of care. I answered questions to her satisfaction.      History of Present Illness     Tatiana Lopez is a 39 y.o. female who returns to hematology clinic for management of her severe iron deficiency anemia secondary to chronic blood loss, caused by heavy menses, caused by bulky uterine fibroids.    The patient has history of learning disability however no other significant past medical history who was initially seen by hematology during hospitalization on 6/29/2024.  Patient had outpatient blood work and found to have a hemoglobin of 3.7.  She was sent to the ER for further evaluation.  Anemia was felt to be secondary to acute menstrual blood loss.  Patient has a history of heavy periods.  She also follows with gynecology.  Patient received 4 units PRBCs and Venofer 300 mg x 3 doses.  She was discharged on oral iron.  Subsequently, she was referred to hematology clinic for outpatient management of severe iron deficiency anemia.                 Interim History: The patient initiated oral contraceptive agents (OCPs) on September 30, 2024 to improve heavy menses and to lessen her severe, chronic gyn blood loss. With OCPs, she has noticed improvement of menses with less severe bleeding as compared to the past several months. Her most recent CBC with differential on 10/04/2024 showed microcytic anemia with a hemoglobin of 7.7 g/dL,  hematocrit of 26.7%.  On October 4, 2024, the patient received an infusion of iron sucrose (Venofer) 300 mg IV. With oral and parenteral iron supplementation, the patient had slow improvement of her severe iron deficiency anemia. Today, shed does not have new symptoms.    Review of Systems:   Constitutional:  Negative for activity change, appetite change, chills, diaphoresis, fatigue, fever and unexpected weight change.   HENT:  Negative for congestion, dental problem, ear discharge, ear pain, facial swelling, hearing loss, mouth sores, nosebleeds, postnasal drip, rhinorrhea, sinus pain, sneezing, sore throat, tinnitus, trouble swallowing and voice change.    Eyes:  Negative for photophobia, pain, discharge, redness, itching and visual disturbance.   Respiratory:  Negative for apnea, cough, choking, chest tightness, shortness of breath, wheezing and stridor.    Cardiovascular:  Negative for chest pain, palpitations and leg swelling.   Gastrointestinal:  Negative for abdominal distention, abdominal pain, anal bleeding, blood in stool, constipation, diarrhea, nausea, rectal pain and vomiting.   Endocrine: Negative for cold intolerance and heat intolerance.   Genitourinary:  Positive for vaginal bleeding. Negative for decreased urine volume, difficulty urinating, dysuria, enuresis, flank pain, frequency, hematuria and urgency.   Musculoskeletal:  Negative for arthralgias, back pain, gait problem, joint swelling, myalgias, neck pain and neck stiffness.   Skin:  Negative for color change, pallor, rash and wound.   Neurological:  Negative for dizziness, tremors, seizures, syncope, facial asymmetry, speech difficulty, weakness, light-headedness, numbness and headaches.   Hematological:  Negative for adenopathy. Does not bruise/bleed easily.   Psychiatric/Behavioral:  Negative for behavioral problems, confusion, dysphoric mood and sleep disturbance. The patient is not nervous/anxious.       Physical Exam:  Vitals reviewed.    Constitutional:       General: She is not in acute distress.     Appearance: Normal appearance. She is normal weight. She is not toxic-appearing.   HENT:      Head: Normocephalic and atraumatic.      Nose: Nose normal. No congestion.      Mouth/Throat:      Mouth: Mucous membranes are moist.      Pharynx: Oropharynx is clear. No oropharyngeal exudate or posterior oropharyngeal erythema.   Eyes:      General: No scleral icterus.     Conjunctiva/sclera: Conjunctivae normal.      Pupils: Pupils are equal, round, and reactive to light.      Comments: Lateral strabismus of the right eye   Cardiovascular:      Rate and Rhythm: Normal rate and regular rhythm.      Pulses: Normal pulses.      Heart sounds: Normal heart sounds. No murmur heard.     No friction rub. No gallop.   Pulmonary:      Effort: Pulmonary effort is normal. No respiratory distress.      Breath sounds: Normal breath sounds. No stridor. No wheezing, rhonchi or rales.   Chest:      Chest wall: No tenderness.   Abdominal:      General: Bowel sounds are normal. There is distension (Massive enlargement of the uterus, occupying the abdomen up to the right and left upper quadrants.  Palpation of massively enlarged uterus nonpainful).      Palpations: There is mass (Massively an enlarged uterus occupying almost the entire abdomen as described above).      Tenderness: There is no abdominal tenderness. There is no right CVA tenderness, left CVA tenderness, guarding or rebound.      Hernia: No hernia is present.   Musculoskeletal:         General: No swelling, tenderness or deformity. Normal range of motion.      Cervical back: Normal range of motion and neck supple. No rigidity or tenderness.      Right lower leg: No edema.      Left lower leg: No edema.   Lymphadenopathy:      Cervical: No cervical adenopathy.   Skin:     General: Skin is warm and dry.      Capillary Refill: Capillary refill takes less than 2 seconds.      Coloration: Skin is not jaundiced or  pale.      Findings: No bruising, erythema, lesion or rash.   Neurological:      General: No focal deficit present.      Mental Status: She is alert and oriented to person, place, and time. Mental status is at baseline.      Cranial Nerves: No cranial nerve deficit.      Sensory: No sensory deficit.      Motor: No weakness.      Coordination: Coordination normal.      Gait: Gait normal.      Deep Tendon Reflexes: Reflexes normal.   Psychiatric:         Mood and Affect: Mood normal.         Behavior: Behavior normal.         Thought Content: Thought content normal.         Recent CBCs:    Component      Latest Ref Rng 6/28/2024 6/29/2024 6/30/2024 7/1/2024 9/11/2024 9/17/2024   WBC      4.31 - 10.16 Thousand/uL 3.95 (L)  5.08  8.32  8.04  11.51 (H)  6.52    RBC      3.81 - 5.12 Million/uL 2.81 (L)  3.50 (L)  3.94  3.92  3.03 (L)  3.11 (L)    Hemoglobin      11.5 - 15.4 g/dL 3.7 (LL)  6.4 (L)  7.7 (L)  7.6 (L)  7.0 (L)  7.1 (L)    Hemoglobin       3.9 (LL)  5.7 (LL)        Hematocrit      34.8 - 46.1 % 14.6 (L)  23.1 (L)  26.1 (L)  26.8 (L)  24.1 (L)  24.7 (L)    Hematocrit       16.3 (L)         MCV      82 - 98 fL 58 (L)  66 (L)  66 (L)  68 (L)  80 (L)  79 (L)    MCH      26.8 - 34.3 pg 13.9 (L)  18.3 (L)  19.5 (L)  19.4 (L)  23.1 (L)  22.8 (L)    MCHC      31.4 - 37.4 g/dL 23.9 (L)  27.7 (L)  29.5 (L)  28.4 (L)  29.0 (L)  28.7 (L)    RDW      11.6 - 15.1 % 27.8 (H)  30.9 (H)  30.3 (H)  32.0 (H)  20.9 (H)  20.7 (H)    Platelet Count      149 - 390 Thousands/uL 372  392 (H)  473 (H)  477 (H)  364  585 (H)    MPV      8.9 - 12.7 fL     10.4  9.7      Component      Latest Ref Rn 9/27/2024 10/4/2024   WBC      4.31 - 10.16 Thousand/uL 5.21  5.64    RBC      3.81 - 5.12 Million/uL 3.47 (L)  3.53 (L)    Hemoglobin      11.5 - 15.4 g/dL 7.6 (L)  7.7 (L)    Hematocrit      34.8 - 46.1 % 26.5 (L)  26.7 (L)    MCV      82 - 98 fL 76 (L)  76 (L)    MCH      26.8 - 34.3 pg 21.9 (L)  21.8 (L)    MCHC      31.4 - 37.4 g/dL 28.7  (L)  28.8 (L)    RDW      11.6 - 15.1 % 21.2 (H)  21.6 (H)    Platelet Count      149 - 390 Thousands/uL 507 (H)  408 (H)    MPV      8.9 - 12.7 fL 10.0  10.4       Legend:  (L) Low  (LL) Low Panic  (H) High      Review of Systems   Constitutional:  Negative for activity change, appetite change, chills, diaphoresis, fatigue, fever and unexpected weight change.   HENT:  Negative for congestion, dental problem, ear discharge, ear pain, facial swelling, hearing loss, mouth sores, nosebleeds, postnasal drip, rhinorrhea, sinus pain, sneezing, sore throat, tinnitus, trouble swallowing and voice change.    Eyes:  Negative for photophobia, pain, discharge, redness, itching and visual disturbance.   Respiratory:  Negative for apnea, cough, choking, chest tightness, shortness of breath, wheezing and stridor.    Cardiovascular:  Negative for chest pain, palpitations and leg swelling.   Gastrointestinal:  Negative for abdominal distention, abdominal pain, anal bleeding, blood in stool, constipation, diarrhea, nausea, rectal pain and vomiting.   Endocrine: Negative for cold intolerance and heat intolerance.   Genitourinary:  Negative for decreased urine volume, difficulty urinating, dysuria, enuresis, flank pain, frequency, hematuria and urgency.   Musculoskeletal:  Negative for arthralgias, back pain, gait problem, joint swelling, myalgias, neck pain and neck stiffness.   Skin:  Negative for color change, pallor, rash and wound.   Neurological:  Negative for dizziness, tremors, seizures, syncope, facial asymmetry, speech difficulty, weakness, light-headedness, numbness and headaches.   Hematological:  Negative for adenopathy. Does not bruise/bleed easily.   Psychiatric/Behavioral:  Negative for behavioral problems, confusion, dysphoric mood and sleep disturbance. The patient is not nervous/anxious.            Objective     There were no vitals taken for this visit.    Physical Exam

## 2024-10-29 NOTE — ASSESSMENT & PLAN NOTE
Tatiana Lopez is a 39-year-old -American female with a learning disability and severe iron deficiency anemia due to chronic, sever gynecologic blood loss caused by bulky uterine fibroids and massively enlarged uterus. Her excessive vaginal bleeding during menstrual cycles is the cause of her iron deficiency anemia. On 09/30/2024, she re-initiated oral contraceptives for bleeding control prescribed by the Gyn service. Patient may require a hysterectomy to control her heavy menses which have resulted in severe iron deficiency anemia. So far, the patient has required transfusion of 4 units of pRBCs in late June 2024 and parenteral plus oral iron replacement therapy with moderate improvement of her severe iron deficiency anemia.     She initiated oral contraceptive agents on September 30, 2024 to improve heavy menses and to lessen her severe, chronic blood loss. With OCPs, she has noticed improvement of menses with less severe bleeding as compared to the past several months. Her most recent CBC with differential on 10/04/2024 showed microcytic anemia with a hemoglobin of 7.7 g/dL, hematocrit of 26.7%.  On October 4, 2024, the patient received an infusion of iron sucrose (Venofer) 300 mg IV. With oral and parenteral iron supplementation, the patient had slow improvement of her severe iron deficiency anemia.    Also, the patient will have outpatient assessment by Dr. Roman on 11/04/2024 for consideration of a hysterectomy. In the meantime, the patient will continue oral iron replacement therapy and transfusion of pRBCs as needed. However, it is unlikely that the patient may achieve a target hemoglobin of 10 g/dL or higher with oral and parenteral iron replacement therapy, if she continues to have heavy manses with severe, chronic gynecologic blood loss, caused by a massively enlarged uterus and bulky uterine fibroids.    Plan:  Outpatient gynecologic evaluation by Dr. Roman to discuss risks and benefits  of hysterectomy on 11/04/2024    Continue oral iron replacement therapy with ferrous sulfate 325 mg PO daily  Transfusion of pRBCs as needed  RTC in 2 months                 Orders:    CBC and differential; Future      Patient understands and agrees with my management recommendations and plan of care. I answered questions to her satisfaction.

## 2024-11-04 ENCOUNTER — OFFICE VISIT (OUTPATIENT)
Dept: OBGYN CLINIC | Facility: CLINIC | Age: 39
End: 2024-11-04
Payer: COMMERCIAL

## 2024-11-04 VITALS
SYSTOLIC BLOOD PRESSURE: 142 MMHG | DIASTOLIC BLOOD PRESSURE: 80 MMHG | HEIGHT: 64 IN | BODY MASS INDEX: 32.61 KG/M2 | WEIGHT: 191 LBS

## 2024-11-04 DIAGNOSIS — D25.9 UTERINE LEIOMYOMA, UNSPECIFIED LOCATION: Primary | ICD-10-CM

## 2024-11-04 DIAGNOSIS — D50.0 IRON DEFICIENCY ANEMIA DUE TO CHRONIC BLOOD LOSS: ICD-10-CM

## 2024-11-04 PROCEDURE — 99213 OFFICE O/P EST LOW 20 MIN: CPT | Performed by: STUDENT IN AN ORGANIZED HEALTH CARE EDUCATION/TRAINING PROGRAM

## 2024-11-04 NOTE — PROGRESS NOTES
"Gynecology History & Physical    Patient Name: Tatiana Lopez  Patient MRN:  [id number]  Date:  24   : 1985  Service: Gynecology     Subjective    Tatiana Lopez is a 39 y.o.  female who presents for pre-op planning for the following procedure:     Feels like belly became this large in the last couple months   + pelvic pain, abdominal, heavy periods   Taking OCPs with improvement   Denies pain with defacation     Denies chest pain, SOB, palpitations.   Smokingdenies    Surgery- tonsils, nasal sx, 2 spinal sx   Pap - needs to be collected   EMB - needs to be collected   US ***     History reviewed. No pertinent past medical history.    Past Surgical History:   Procedure Laterality Date   • ANKLE FRACTURE SURGERY Left     per patient   • REDUCTION MAMMAPLASTY Bilateral    • SPINE SURGERY N/A     per patient   • TONSILECTOMY AND ADNOIDECTOMY Bilateral        Allergies:   Allergies   Allergen Reactions   • Ceclor [Cefaclor]          Review of Systems  A 10 point review of systems was performed and was otherwise unrevealing for pertinent positives or negatives except as noted above.      Objective     /80 (BP Location: Left arm, Patient Position: Sitting, Cuff Size: Standard)   Ht 5' 4\" (1.626 m)   Wt 86.6 kg (191 lb)   LMP 10/29/2024 (Exact Date)   BMI 32.79 kg/m²    OBGyn Exam    Assessment & Plan     Tatiana Lopez is a 39 y.o.  female ***    - Procedure consent signed discussing all the risks, benefits and alternatives specified on the consent form. Emphasized risk of ***  - Pre operative clearance - ***  - Antibiotics during surgery - ***  - Hgb *** . Patient *** accept blood transfusions if indicated.   - Proceed to OR for above mentioned procedures ***.        Problem List       Anemia    Learning disability    Overview     Learning disability  Participates in special olypics  On disability   Completed high school         Overweight    Family history of diabetes mellitus "    Overview     Uncle - diabetes  Grandfather- diabetes         Iron deficiency anemia due to chronic blood loss         Lilo Diehl MD   11/04/24   8:21 AM

## 2024-11-04 NOTE — ASSESSMENT & PLAN NOTE
- large 40+ week size uterus palpated on exam- not a candidate for minimally invasive surgery   - given patient reported rapid growth and size of uterus, will refer to GYN ONC for surgical eval  - RTO 11/22 for pap and EMB     Orders:    Ambulatory Referral to Gynecologic Oncology; Future

## 2024-11-04 NOTE — PROGRESS NOTES
Ambulatory Visit  Name: Tatiana Lopez      : 1985      MRN: [id number]  Encounter Provider: Lilo Diehl MD  Encounter Date: 2024   Encounter department: [facility] [place]    Assessment & Plan  Uterine leiomyoma, unspecified location  - large 40+ week size uterus palpated on exam- not a candidate for minimally invasive surgery   - given patient reported rapid growth and size of uterus, will refer to GYN ONC for surgical eval  - RTO  for pap and EMB     Orders:    Ambulatory Referral to Gynecologic Oncology; Future    Iron deficiency anemia due to chronic blood loss  - last hgb 7.7   - s/p blood and fe transfusions for hgb as low as 3, follows with heme onc   - will likely need optimization prior to surgery          History of Present Illness     Tatiana Lopez is a 39 y.o. female who presents to discuss management of fibroid uterus. She is currently experiencing abdominal and pelvic pain and heavy periods. She feels that she has experienced significant growth of her abdomen in the last couple months. She is currently taking OCPs with improvement in bleeding. Of note, she has not seen GYN since - normal exam reported at that time     Denies smoking hx  Surgery- tonsils, nasal sx, 2 spinal sx   Pap - needs to be collected   EMB - needs to be collected   US  1.  Markedly enlarged fibroid uterus with largest estimated fibroid measuring 17.9 x 13.9 x 27 cm. Uterus extends in close proximity to the liver and felt to correspond to the palpable abnormality in the left upper abdomen. If clinically warranted   further assessment may be considered with contrast-enhanced pelvic MRI.  2.  Simple appearing unilocular cyst in the right ovary measuring 4.8 cm.  3.  Endometrial stripe not identified sonographically.    History obtained from : patient  Review of Systems   All other systems reviewed and are negative.        Objective     /80 (BP Location: Left arm,  "Patient Position: Sitting, Cuff Size: Standard)   Ht 5' 4\" (1.626 m)   Wt 86.6 kg (191 lb)   LMP 10/29/2024 (Exact Date)   BMI 32.79 kg/m²     Physical Exam  Vitals and nursing note reviewed.   Constitutional:       General: She is not in acute distress.     Appearance: She is well-developed.   HENT:      Head: Normocephalic and atraumatic.   Eyes:      Conjunctiva/sclera: Conjunctivae normal.   Cardiovascular:      Rate and Rhythm: Normal rate and regular rhythm.      Heart sounds: No murmur heard.  Pulmonary:      Effort: Pulmonary effort is normal. No respiratory distress.      Breath sounds: Normal breath sounds.   Abdominal:      Palpations: Abdomen is soft. There is mass.      Tenderness: There is no abdominal tenderness. There is no guarding.      Comments: 40+ week size mass palpated on exam    Musculoskeletal:         General: No swelling.      Cervical back: Neck supple.   Skin:     General: Skin is warm and dry.      Capillary Refill: Capillary refill takes less than 2 seconds.   Neurological:      Mental Status: She is alert.   Psychiatric:         Mood and Affect: Mood normal.         "

## 2024-11-04 NOTE — ASSESSMENT & PLAN NOTE
- last hgb 7.7   - s/p blood and fe transfusions for hgb as low as 3, follows with heme onc   - will likely need optimization prior to surgery

## 2024-11-05 ENCOUNTER — DOCUMENTATION (OUTPATIENT)
Dept: HEMATOLOGY ONCOLOGY | Facility: CLINIC | Age: 39
End: 2024-11-05

## 2024-11-05 ENCOUNTER — TELEPHONE (OUTPATIENT)
Dept: GYNECOLOGIC ONCOLOGY | Facility: CLINIC | Age: 39
End: 2024-11-05

## 2024-11-05 NOTE — PROGRESS NOTES
Chart rvw'd on 2024      Referred by:  Dr. Lilo Diehl    Diagnosis:  Uterine leiomyoma, unspecified location    Imagin2024 US pelvis transabdominal only-  1.  Markedly enlarged fibroid uterus with largest estimated fibroid measuring 17.9 x 13.9 x 27 cm. Uterus extends in close proximity to the liver and felt to correspond to the palpable abnormality in the left upper abdomen. If clinically warranted further assessment may be considered with contrast-enhanced pelvic MRI.  2.  Simple appearing unilocular cyst in the right ovary measuring 4.8 cm.  3.  Endometrial stripe not identified sonographically.      Pathology:  N/A      Surgery:  N/A      Post surgical pathology:  N/A

## 2024-11-11 NOTE — ASSESSMENT & PLAN NOTE
38yo with anemia and known fibroid uterus presents for consultation.    Ultrasound reviewed. Exam c/w extremely large uterus extending to xyphoid and extending sides of abdomen. Pt is unable to provide information on when or if she ever felt abdominal distention and reports she iddn't know about this until the ultrasound. We do not have any imaging prior to the ultrasound and no documentation of a pelvic exam/abdominal exam to know if this was present in previous years or if it has rapidly grown. D/w pt that it is possible this is just benign fibroids that have been growing for some time vs malignancy if it grew rapidly. Regardless, surgical intervention is warranted for therapeutic and diagnostic work up.     Surgical risks were reviewed with the patient including infection, blood loss, transfusion, damage to other organs and possible need for additional procedures including bowel or bladder surgery. I reviewed in detail the risk of bowel and urinary tract injury. Wound infection was reviewed. Postoperative recovery was reviewed, including the risk of venous thrombosis, bowel adhesions, ileus and incisional hernia formation. The patient was given time to ask pertinent questions and would like to proceed with the procedure. Will err on ovarian retention if feasible based on anatomy given young age and low association even if malignancy. Other alternatives including nonsurgical options reviewed with patients.     Reviewed postoperative expectations and instructions, including pelvic rest for 8 weeks, no heavy lifting for 4 weeks.  Consents signed  PATs  CT C/A/P

## 2024-11-11 NOTE — PROGRESS NOTES
Assessment/Plan:    Problem List Items Addressed This Visit       Anemia     Has been receiving iron infusions and on OCPs for heavy periods.  D/w pt the high likelihood of needing a blood transfusion given severly anemic and the size of the uterus with high blood flow.     Pt agrees.          Learning disability     Pt reports she makes her own medical decisions but her dad helps her (lives with him).   She is a poor historian but is able to express understanding.          Fibroid uterus - Primary     38yo with anemia and known fibroid uterus presents for consultation.    Ultrasound reviewed. Exam c/w extremely large uterus extending to xyphoid and extending sides of abdomen. Pt is unable to provide information on when or if she ever felt abdominal distention and reports she iddn't know about this until the ultrasound. We do not have any imaging prior to the ultrasound and no documentation of a pelvic exam/abdominal exam to know if this was present in previous years or if it has rapidly grown. D/w pt that it is possible this is just benign fibroids that have been growing for some time vs malignancy if it grew rapidly. Regardless, surgical intervention is warranted for therapeutic and diagnostic work up.     Surgical risks were reviewed with the patient including infection, blood loss, transfusion, damage to other organs and possible need for additional procedures including bowel or bladder surgery. I reviewed in detail the risk of bowel and urinary tract injury. Wound infection was reviewed. Postoperative recovery was reviewed, including the risk of venous thrombosis, bowel adhesions, ileus and incisional hernia formation. The patient was given time to ask pertinent questions and would like to proceed with the procedure. Will err on ovarian retention if feasible based on anatomy given young age and low association even if malignancy. Other alternatives including nonsurgical options reviewed with patients.      Reviewed postoperative expectations and instructions, including pelvic rest for 8 weeks, no heavy lifting for 4 weeks.  Consents signed  PATs  CT C/A/P                Relevant Orders    CT chest abdomen pelvis w contrast    Case request operating room: HYSTERECTOMY TOTAL ABDOMINAL (TIM) (Completed)    Type and screen    CBC and Platelet    Basic metabolic panel        HEMOGLOBIN A1C W/ EAG ESTIMATION       I have spent a total time of 60 minutes in caring for this patient on the day of the visit/encounter including Diagnostic results, Prognosis, Risks and benefits of tx options, Impressions, Counseling / Coordination of care, Reviewing / ordering tests, medicine, procedures  , and Obtaining or reviewing history  .      CHIEF COMPLAINT: fibroids    Oncology Problem:   Cancer Staging   No matching staging information was found for the patient.      Previous therapy:  Oncology History    No history exists.       Most recent imaging:  US pelvis transabdominal only  Narrative: PELVIC ULTRASOUND, COMPLETE AND LIMITED ABDOMINAL ULTRASOUND    INDICATION: The patient is 39 years old. N92.0: Excessive and frequent menstruation with regular cycle.    COMPARISON: None    TECHNIQUE: Transabdominal pelvic ultrasound and Limited abdominal ultrasound corresponding to the area of clinical concern was performed in sagittal and transverse planes with a curvilinear transducer. Additional transvaginal imaging was performed to   better evaluate the endometrium and ovaries. Imaging included volumetric sweeps as well as traditional still imaging technique.    FINDINGS:    UTERUS:  The uterus is anteverted in position, measuring 33.7 x 11.3 x 18.2 cm.  Uterus is markedly enlarged with heterogeneous echotexture. Uterus appears to be extending in close proximity to the liver. Partially degenerated/calcified left mid fibroid measuring 7.1 x 6.6 x 7.9 cm. Additional fibroid measuring 17.9 x 13.9 x 27 cm.   Additional fibroids may be  present.  The cervix appears within normal limits.    ENDOMETRIUM:  Endometrial stripe not identified.    OVARIES/ADNEXA:  Right ovary: 6.7 x 5.8 x 3.5 cm. 71.1 mL.  Ovarian Doppler flow is within normal limits.  Unilocular simple appearing cyst measuring 3.9 x 3.4 x 4.8 cm.    Left ovary: 5.3 x 3.6 x 3.3 cm. 33.2 mL.  Ovarian Doppler flow is within normal limits.  No suspicious ovarian or adnexal abnormality.    OTHER:  Small amount of fluid is seen in the cul-de-sac and adnexal region.    ULTRASOUND ABDOMEN, LIMITED:  SONOGRAPHIC interrogation of the left upper abdomen demonstrates the enlarged fibroid uterus.  Impression: 1.  Markedly enlarged fibroid uterus with largest estimated fibroid measuring 17.9 x 13.9 x 27 cm. Uterus extends in close proximity to the liver and felt to correspond to the palpable abnormality in the left upper abdomen. If clinically warranted   further assessment may be considered with contrast-enhanced pelvic MRI.  2.  Simple appearing unilocular cyst in the right ovary measuring 4.8 cm.  3.  Endometrial stripe not identified sonographically.    Marked significant in epic for notification purposes.    Workstation performed: [id number]  US abdomen limited  Narrative: PELVIC ULTRASOUND, COMPLETE AND LIMITED ABDOMINAL ULTRASOUND    INDICATION: The patient is 39 years old. N92.0: Excessive and frequent menstruation with regular cycle.    COMPARISON: None    TECHNIQUE: Transabdominal pelvic ultrasound and Limited abdominal ultrasound corresponding to the area of clinical concern was performed in sagittal and transverse planes with a curvilinear transducer. Additional transvaginal imaging was performed to   better evaluate the endometrium and ovaries. Imaging included volumetric sweeps as well as traditional still imaging technique.    FINDINGS:    UTERUS:  The uterus is anteverted in position, measuring 33.7 x 11.3 x 18.2 cm.  Uterus is markedly enlarged with heterogeneous echotexture. Uterus  "appears to be extending in close proximity to the liver. Partially degenerated/calcified left mid fibroid measuring 7.1 x 6.6 x 7.9 cm. Additional fibroid measuring 17.9 x 13.9 x 27 cm.   Additional fibroids may be present.  The cervix appears within normal limits.    ENDOMETRIUM:  Endometrial stripe not identified.    OVARIES/ADNEXA:  Right ovary: 6.7 x 5.8 x 3.5 cm. 71.1 mL.  Ovarian Doppler flow is within normal limits.  Unilocular simple appearing cyst measuring 3.9 x 3.4 x 4.8 cm.    Left ovary: 5.3 x 3.6 x 3.3 cm. 33.2 mL.  Ovarian Doppler flow is within normal limits.  No suspicious ovarian or adnexal abnormality.    OTHER:  Small amount of fluid is seen in the cul-de-sac and adnexal region.    ULTRASOUND ABDOMEN, LIMITED:  SONOGRAPHIC interrogation of the left upper abdomen demonstrates the enlarged fibroid uterus.  Impression: 1.  Markedly enlarged fibroid uterus with largest estimated fibroid measuring 17.9 x 13.9 x 27 cm. Uterus extends in close proximity to the liver and felt to correspond to the palpable abnormality in the left upper abdomen. If clinically warranted   further assessment may be considered with contrast-enhanced pelvic MRI.  2.  Simple appearing unilocular cyst in the right ovary measuring 4.8 cm.  3.  Endometrial stripe not identified sonographically.    Marked significant in epic for notification purposes.    Workstation performed: [id number]        Patient ID: Tatiana Lopez is a 39 y.o. female  40yo with anemia and known fibroid uterus presents for consultation. Pt with heavy bleeding and associated anemia. Being treated by hematology. She is on OCPs with some improvement in her bleeding since september. There is not documented pelvic exam at that time, just record of menorrhagia/anemia and starting of OCPs. She had pelvic u/s done in August and was told she had \"cysts in her uterus\" and did not follow up again until November. She reported to gyn c/o abdominal pain and noted to have " 40+ week size uterus. She was referred for further management.   Pt reports she did not know she had a mass until the ultrasound and believe she wasn't told a bout it for a few weeks and then presented when she felt abdominal pain to address. She reports periods are lighter on OCPs. Her last iron transfusion was in October.       Review of Systems   Constitutional: Negative.    HENT: Negative.     Eyes: Negative.    Respiratory: Negative.     Cardiovascular: Negative.    Gastrointestinal:  Positive for abdominal pain.   Endocrine: Negative.    Genitourinary:  Positive for menstrual problem and pelvic pain.   Musculoskeletal: Negative.    Neurological: Negative.    Psychiatric/Behavioral: Negative.         Current Outpatient Medications   Medication Sig Dispense Refill    ferrous gluconate (FERGON) 324 mg tablet Take 1 tablet (324 mg total) by mouth daily with breakfast 30 tablet 1    norgestrel-ethinyl estradiol (Cryselle-28) 0.3 mg-30 mcg per tablet Take 1 tablet by mouth daily 84 tablet 0    neomycin-polymyxin-dexamethasone (MAXITROL) 0.35%-10,000 units/g-0.1% PLACE SMALL AMOUNT IN LOWER EYELID AT BEDTIME FOR 7 DAYS START AFTER SURGERY (Patient not taking: Reported on 2024)       No current facility-administered medications for this visit.       Allergies   Allergen Reactions    Ceclor [Cefaclor]        History reviewed. No pertinent past medical history.    Past Surgical History:   Procedure Laterality Date    ANKLE FRACTURE SURGERY Left     per patient    REDUCTION MAMMAPLASTY Bilateral     SPINE SURGERY N/A     per patient    TONSILECTOMY AND ADNOIDECTOMY Bilateral        OB History          0    Para   0    Term   0       0    AB   0    Living   0         SAB   0    IAB   0    Ectopic   0    Multiple   0    Live Births   0                 History reviewed. No pertinent family history.    The following portions of the patient's history were reviewed and updated as appropriate: allergies,  "current medications, past family history, past medical history, past social history, past surgical history, and problem list.      Objective:    Blood pressure 150/86, pulse 88, temperature 97.6 °F (36.4 °C), temperature source Temporal, resp. rate 18, height 5' 4\" (1.626 m), weight 88 kg (194 lb), last menstrual period 10/29/2024, SpO2 98%.  Body mass index is 33.3 kg/m².    Physical Exam  HENT:      Head: Normocephalic and atraumatic.      Nose: Nose normal.   Cardiovascular:      Rate and Rhythm: Normal rate and regular rhythm.   Pulmonary:      Effort: Pulmonary effort is normal.   Abdominal:      General: There is distension.      Palpations: Abdomen is soft. There is mass.   Genitourinary:     Comments: defer  Musculoskeletal:         General: No swelling. Normal range of motion.      Cervical back: Normal range of motion.   Skin:     General: Skin is warm and dry.   Neurological:      Mental Status: She is alert.   Psychiatric:         Mood and Affect: Mood normal.         Behavior: Behavior normal.         Judgment: Judgment normal.           No results found for: \"\"  Lab Results   Component Value Date    WBC 5.64 10/04/2024    HGB 7.7 (L) 10/04/2024    HCT 26.7 (L) 10/04/2024    MCV 76 (L) 10/04/2024     (H) 10/04/2024     Lab Results   Component Value Date    K 4.4 09/11/2024     09/11/2024    CO2 27 09/11/2024    BUN 10 09/11/2024    CREATININE 0.55 (L) 09/11/2024    GLUF 95 09/11/2024    CALCIUM 8.8 09/11/2024    AST 28 09/11/2024    ALT 18 09/11/2024    ALKPHOS 51 09/11/2024    EGFR 118 09/11/2024        Trend:  No results found for: \"\"  "

## 2024-11-12 ENCOUNTER — APPOINTMENT (OUTPATIENT)
Dept: LAB | Facility: CLINIC | Age: 39
End: 2024-11-12
Payer: COMMERCIAL

## 2024-11-12 ENCOUNTER — TELEPHONE (OUTPATIENT)
Age: 39
End: 2024-11-12

## 2024-11-12 ENCOUNTER — CONSULT (OUTPATIENT)
Dept: GYNECOLOGIC ONCOLOGY | Facility: CLINIC | Age: 39
End: 2024-11-12
Payer: COMMERCIAL

## 2024-11-12 VITALS
WEIGHT: 194 LBS | OXYGEN SATURATION: 98 % | SYSTOLIC BLOOD PRESSURE: 150 MMHG | HEIGHT: 64 IN | RESPIRATION RATE: 18 BRPM | TEMPERATURE: 97.6 F | HEART RATE: 88 BPM | BODY MASS INDEX: 33.12 KG/M2 | DIASTOLIC BLOOD PRESSURE: 86 MMHG

## 2024-11-12 DIAGNOSIS — D25.0 INTRAMURAL, SUBMUCOUS, AND SUBSEROUS LEIOMYOMA OF UTERUS: ICD-10-CM

## 2024-11-12 DIAGNOSIS — D25.9 UTERINE LEIOMYOMA, UNSPECIFIED LOCATION: Primary | ICD-10-CM

## 2024-11-12 DIAGNOSIS — D25.0 INTRAMURAL, SUBMUCOUS, AND SUBSEROUS LEIOMYOMA OF UTERUS: Primary | ICD-10-CM

## 2024-11-12 DIAGNOSIS — D25.2 INTRAMURAL, SUBMUCOUS, AND SUBSEROUS LEIOMYOMA OF UTERUS: ICD-10-CM

## 2024-11-12 DIAGNOSIS — N92.0 MENORRHAGIA WITH REGULAR CYCLE: ICD-10-CM

## 2024-11-12 DIAGNOSIS — D25.1 INTRAMURAL, SUBMUCOUS, AND SUBSEROUS LEIOMYOMA OF UTERUS: Primary | ICD-10-CM

## 2024-11-12 DIAGNOSIS — D25.1 INTRAMURAL, SUBMUCOUS, AND SUBSEROUS LEIOMYOMA OF UTERUS: ICD-10-CM

## 2024-11-12 DIAGNOSIS — F81.9 LEARNING DISABILITY: ICD-10-CM

## 2024-11-12 DIAGNOSIS — D50.0 IRON DEFICIENCY ANEMIA DUE TO CHRONIC BLOOD LOSS: ICD-10-CM

## 2024-11-12 DIAGNOSIS — D25.2 INTRAMURAL, SUBMUCOUS, AND SUBSEROUS LEIOMYOMA OF UTERUS: Primary | ICD-10-CM

## 2024-11-12 DIAGNOSIS — D25.9 UTERINE LEIOMYOMA, UNSPECIFIED LOCATION: ICD-10-CM

## 2024-11-12 LAB
ANION GAP SERPL CALCULATED.3IONS-SCNC: 8 MMOL/L (ref 4–13)
BUN SERPL-MCNC: 7 MG/DL (ref 5–25)
CALCIUM SERPL-MCNC: 8.9 MG/DL (ref 8.4–10.2)
CHLORIDE SERPL-SCNC: 106 MMOL/L (ref 96–108)
CO2 SERPL-SCNC: 25 MMOL/L (ref 21–32)
CREAT SERPL-MCNC: 0.46 MG/DL (ref 0.6–1.3)
GFR SERPL CREATININE-BSD FRML MDRD: 125 ML/MIN/1.73SQ M
GLUCOSE P FAST SERPL-MCNC: 73 MG/DL (ref 65–99)
POTASSIUM SERPL-SCNC: 3.9 MMOL/L (ref 3.5–5.3)
SODIUM SERPL-SCNC: 139 MMOL/L (ref 135–147)

## 2024-11-12 PROCEDURE — 80048 BASIC METABOLIC PNL TOTAL CA: CPT

## 2024-11-12 PROCEDURE — 99205 OFFICE O/P NEW HI 60 MIN: CPT | Performed by: OBSTETRICS & GYNECOLOGY

## 2024-11-12 PROCEDURE — 36415 COLL VENOUS BLD VENIPUNCTURE: CPT

## 2024-11-12 RX ORDER — NEOMYCIN SULFATE, POLYMYXIN B SULFATE, AND DEXAMETHASONE 3.5; 10000; 1 MG/G; [USP'U]/G; MG/G
OINTMENT OPHTHALMIC
COMMUNITY
Start: 2024-08-19

## 2024-11-12 RX ORDER — METRONIDAZOLE 500 MG/100ML
500 INJECTION, SOLUTION INTRAVENOUS ONCE
Status: CANCELLED | OUTPATIENT
Start: 2024-11-12 | End: 2024-11-12

## 2024-11-12 RX ORDER — HEPARIN SODIUM 5000 [USP'U]/ML
5000 INJECTION, SOLUTION INTRAVENOUS; SUBCUTANEOUS
Status: CANCELLED | OUTPATIENT
Start: 2024-11-12 | End: 2024-11-13

## 2024-11-12 RX ORDER — CEFAZOLIN SODIUM 2 G/50ML
2000 SOLUTION INTRAVENOUS ONCE
Status: CANCELLED | OUTPATIENT
Start: 2024-11-12 | End: 2024-11-12

## 2024-11-12 NOTE — LETTER
November 12, 2024     Lilo Diehl MD  [address] [facility] [address]    Patient: Tatiana Lopez   YOB: 1985   Date of Visit: 11/12/2024       Dear Dr. Diehl:    Thank you for referring Tatiana Lopez to me for evaluation. Below are my notes for this consultation.    If you have questions, please do not hesitate to call me. I look forward to following your patient along with you.         Sincerely,        Jewels Angel MD        CC: No Recipients    Jewels Angel MD  11/12/2024  9:41 AM  Sign when Signing Visit  Assessment/Plan:    Problem List Items Addressed This Visit       Anemia     Has been receiving iron infusions and on OCPs for heavy periods.  D/w pt the high likelihood of needing a blood transfusion given severly anemic and the size of the uterus with high blood flow.     Pt agrees.          Learning disability     Pt reports she makes her own medical decisions but her dad helps her (lives with him).   She is a poor historian but is able to express understanding.          Fibroid uterus - Primary     38yo with anemia and known fibroid uterus presents for consultation.    Ultrasound reviewed. Exam c/w extremely large uterus extending to xyphoid and extending sides of abdomen. Pt is unable to provide information on when or if she ever felt abdominal distention and reports she iddn't know about this until the ultrasound. We do not have any imaging prior to the ultrasound and no documentation of a pelvic exam/abdominal exam to know if this was present in previous years or if it has rapidly grown. D/w pt that it is possible this is just benign fibroids that have been growing for some time vs malignancy if it grew rapidly. Regardless, surgical intervention is warranted for therapeutic and diagnostic work up.     Surgical risks were reviewed with the patient including infection, blood loss, transfusion, damage to other organs and possible need for additional procedures including  bowel or bladder surgery. I reviewed in detail the risk of bowel and urinary tract injury. Wound infection was reviewed. Postoperative recovery was reviewed, including the risk of venous thrombosis, bowel adhesions, ileus and incisional hernia formation. The patient was given time to ask pertinent questions and would like to proceed with the procedure. Will err on ovarian retention if feasible based on anatomy given young age and low association even if malignancy. Other alternatives including nonsurgical options reviewed with patients.     Reviewed postoperative expectations and instructions, including pelvic rest for 8 weeks, no heavy lifting for 4 weeks.  Consents signed  PATs  CT C/A/P                Relevant Orders    CT chest abdomen pelvis w contrast    Case request operating room: HYSTERECTOMY TOTAL ABDOMINAL (TIM) (Completed)    Type and screen    CBC and Platelet    Basic metabolic panel        HEMOGLOBIN A1C W/ EAG ESTIMATION       I have spent a total time of 60 minutes in caring for this patient on the day of the visit/encounter including Diagnostic results, Prognosis, Risks and benefits of tx options, Impressions, Counseling / Coordination of care, Reviewing / ordering tests, medicine, procedures  , and Obtaining or reviewing history  .      CHIEF COMPLAINT: fibroids    Oncology Problem:   Cancer Staging   No matching staging information was found for the patient.      Previous therapy:  Oncology History    No history exists.       Most recent imaging:  US pelvis transabdominal only  Narrative: PELVIC ULTRASOUND, COMPLETE AND LIMITED ABDOMINAL ULTRASOUND    INDICATION: The patient is 39 years old. N92.0: Excessive and frequent menstruation with regular cycle.    COMPARISON: None    TECHNIQUE: Transabdominal pelvic ultrasound and Limited abdominal ultrasound corresponding to the area of clinical concern was performed in sagittal and transverse planes with a curvilinear transducer. Additional  transvaginal imaging was performed to   better evaluate the endometrium and ovaries. Imaging included volumetric sweeps as well as traditional still imaging technique.    FINDINGS:    UTERUS:  The uterus is anteverted in position, measuring 33.7 x 11.3 x 18.2 cm.  Uterus is markedly enlarged with heterogeneous echotexture. Uterus appears to be extending in close proximity to the liver. Partially degenerated/calcified left mid fibroid measuring 7.1 x 6.6 x 7.9 cm. Additional fibroid measuring 17.9 x 13.9 x 27 cm.   Additional fibroids may be present.  The cervix appears within normal limits.    ENDOMETRIUM:  Endometrial stripe not identified.    OVARIES/ADNEXA:  Right ovary: 6.7 x 5.8 x 3.5 cm. 71.1 mL.  Ovarian Doppler flow is within normal limits.  Unilocular simple appearing cyst measuring 3.9 x 3.4 x 4.8 cm.    Left ovary: 5.3 x 3.6 x 3.3 cm. 33.2 mL.  Ovarian Doppler flow is within normal limits.  No suspicious ovarian or adnexal abnormality.    OTHER:  Small amount of fluid is seen in the cul-de-sac and adnexal region.    ULTRASOUND ABDOMEN, LIMITED:  SONOGRAPHIC interrogation of the left upper abdomen demonstrates the enlarged fibroid uterus.  Impression: 1.  Markedly enlarged fibroid uterus with largest estimated fibroid measuring 17.9 x 13.9 x 27 cm. Uterus extends in close proximity to the liver and felt to correspond to the palpable abnormality in the left upper abdomen. If clinically warranted   further assessment may be considered with contrast-enhanced pelvic MRI.  2.  Simple appearing unilocular cyst in the right ovary measuring 4.8 cm.  3.  Endometrial stripe not identified sonographically.    Marked significant in epic for notification purposes.    Workstation performed: [id number]  US abdomen limited  Narrative: PELVIC ULTRASOUND, COMPLETE AND LIMITED ABDOMINAL ULTRASOUND    INDICATION: The patient is 39 years old. N92.0: Excessive and frequent menstruation with regular cycle.    COMPARISON:  None    TECHNIQUE: Transabdominal pelvic ultrasound and Limited abdominal ultrasound corresponding to the area of clinical concern was performed in sagittal and transverse planes with a curvilinear transducer. Additional transvaginal imaging was performed to   better evaluate the endometrium and ovaries. Imaging included volumetric sweeps as well as traditional still imaging technique.    FINDINGS:    UTERUS:  The uterus is anteverted in position, measuring 33.7 x 11.3 x 18.2 cm.  Uterus is markedly enlarged with heterogeneous echotexture. Uterus appears to be extending in close proximity to the liver. Partially degenerated/calcified left mid fibroid measuring 7.1 x 6.6 x 7.9 cm. Additional fibroid measuring 17.9 x 13.9 x 27 cm.   Additional fibroids may be present.  The cervix appears within normal limits.    ENDOMETRIUM:  Endometrial stripe not identified.    OVARIES/ADNEXA:  Right ovary: 6.7 x 5.8 x 3.5 cm. 71.1 mL.  Ovarian Doppler flow is within normal limits.  Unilocular simple appearing cyst measuring 3.9 x 3.4 x 4.8 cm.    Left ovary: 5.3 x 3.6 x 3.3 cm. 33.2 mL.  Ovarian Doppler flow is within normal limits.  No suspicious ovarian or adnexal abnormality.    OTHER:  Small amount of fluid is seen in the cul-de-sac and adnexal region.    ULTRASOUND ABDOMEN, LIMITED:  SONOGRAPHIC interrogation of the left upper abdomen demonstrates the enlarged fibroid uterus.  Impression: 1.  Markedly enlarged fibroid uterus with largest estimated fibroid measuring 17.9 x 13.9 x 27 cm. Uterus extends in close proximity to the liver and felt to correspond to the palpable abnormality in the left upper abdomen. If clinically warranted   further assessment may be considered with contrast-enhanced pelvic MRI.  2.  Simple appearing unilocular cyst in the right ovary measuring 4.8 cm.  3.  Endometrial stripe not identified sonographically.    Marked significant in epic for notification purposes.    Workstation performed:  "[id number]        Patient ID: Tatiana Lopez is a 39 y.o. female  40yo with anemia and known fibroid uterus presents for consultation. Pt with heavy bleeding and associated anemia. Being treated by hematology. She is on OCPs with some improvement in her bleeding since september. There is not documented pelvic exam at that time, just record of menorrhagia/anemia and starting of OCPs. She had pelvic u/s done in August and was told she had \"cysts in her uterus\" and did not follow up again until November. She reported to gyn c/o abdominal pain and noted to have 40+ week size uterus. She was referred for further management.   Pt reports she did not know she had a mass until the ultrasound and believe she wasn't told a bout it for a few weeks and then presented when she felt abdominal pain to address. She reports periods are lighter on OCPs. Her last iron transfusion was in October.       Review of Systems   Constitutional: Negative.    HENT: Negative.     Eyes: Negative.    Respiratory: Negative.     Cardiovascular: Negative.    Gastrointestinal:  Positive for abdominal pain.   Endocrine: Negative.    Genitourinary:  Positive for menstrual problem and pelvic pain.   Musculoskeletal: Negative.    Neurological: Negative.    Psychiatric/Behavioral: Negative.         Current Outpatient Medications   Medication Sig Dispense Refill   • ferrous gluconate (FERGON) 324 mg tablet Take 1 tablet (324 mg total) by mouth daily with breakfast 30 tablet 1   • norgestrel-ethinyl estradiol (Cryselle-28) 0.3 mg-30 mcg per tablet Take 1 tablet by mouth daily 84 tablet 0   • neomycin-polymyxin-dexamethasone (MAXITROL) 0.35%-10,000 units/g-0.1% PLACE SMALL AMOUNT IN LOWER EYELID AT BEDTIME FOR 7 DAYS START AFTER SURGERY (Patient not taking: Reported on 11/12/2024)       No current facility-administered medications for this visit.       Allergies   Allergen Reactions   • Ceclor [Cefaclor]        History reviewed. No pertinent past medical " "history.    Past Surgical History:   Procedure Laterality Date   • ANKLE FRACTURE SURGERY Left     per patient   • REDUCTION MAMMAPLASTY Bilateral    • SPINE SURGERY N/A     per patient   • TONSILECTOMY AND ADNOIDECTOMY Bilateral        OB History          0    Para   0    Term   0       0    AB   0    Living   0         SAB   0    IAB   0    Ectopic   0    Multiple   0    Live Births   0                 History reviewed. No pertinent family history.    The following portions of the patient's history were reviewed and updated as appropriate: allergies, current medications, past family history, past medical history, past social history, past surgical history, and problem list.      Objective:    Blood pressure 150/86, pulse 88, temperature 97.6 °F (36.4 °C), temperature source Temporal, resp. rate 18, height 5' 4\" (1.626 m), weight 88 kg (194 lb), last menstrual period 10/29/2024, SpO2 98%.  Body mass index is 33.3 kg/m².    Physical Exam  HENT:      Head: Normocephalic and atraumatic.      Nose: Nose normal.   Cardiovascular:      Rate and Rhythm: Normal rate and regular rhythm.   Pulmonary:      Effort: Pulmonary effort is normal.   Abdominal:      General: There is distension.      Palpations: Abdomen is soft. There is mass.   Genitourinary:     Comments: defer  Musculoskeletal:         General: No swelling. Normal range of motion.      Cervical back: Normal range of motion.   Skin:     General: Skin is warm and dry.   Neurological:      Mental Status: She is alert.   Psychiatric:         Mood and Affect: Mood normal.         Behavior: Behavior normal.         Judgment: Judgment normal.           No results found for: \"\"  Lab Results   Component Value Date    WBC 5.64 10/04/2024    HGB 7.7 (L) 10/04/2024    HCT 26.7 (L) 10/04/2024    MCV 76 (L) 10/04/2024     (H) 10/04/2024     Lab Results   Component Value Date    K 4.4 2024     2024    CO2 27 2024    BUN 10 " "09/11/2024    CREATININE 0.55 (L) 09/11/2024    GLUF 95 09/11/2024    CALCIUM 8.8 09/11/2024    AST 28 09/11/2024    ALT 18 09/11/2024    ALKPHOS 51 09/11/2024    EGFR 118 09/11/2024        Trend:  No results found for: \"\"  "

## 2024-11-12 NOTE — TELEPHONE ENCOUNTER
Kizzy from  lab called reporting that the patient is at the lab for needed a BMP for in preparation for CT c/a/p scheduled for 11/15.  Kizzy reports there is a BMP ordered for preadmission testing for 12/13 and wants to know if she can use this BMP order for now.  I informed her to use the BMP since pt need that drawn for CT test on 11/15.  If the care team needs another BMP next month when pt completes the rest of the lab work for admission, they can place another order. Message sent to care team.

## 2024-11-12 NOTE — ASSESSMENT & PLAN NOTE
Has been receiving iron infusions and on OCPs for heavy periods.  D/w pt the high likelihood of needing a blood transfusion given severly anemic and the size of the uterus with high blood flow.     Pt agrees.

## 2024-11-12 NOTE — ASSESSMENT & PLAN NOTE
Pt reports she makes her own medical decisions but her dad helps her (lives with him).   She is a poor historian but is able to express understanding.

## 2024-11-17 ENCOUNTER — HOSPITAL ENCOUNTER (INPATIENT)
Facility: HOSPITAL | Age: 39
LOS: 6 days | Discharge: HOME | DRG: 743 | End: 2024-11-24
Attending: EMERGENCY MEDICINE | Admitting: OBSTETRICS & GYNECOLOGY
Payer: COMMERCIAL

## 2024-11-17 ENCOUNTER — APPOINTMENT (EMERGENCY)
Dept: RADIOLOGY | Facility: HOSPITAL | Age: 39
DRG: 743 | End: 2024-11-17
Payer: COMMERCIAL

## 2024-11-17 DIAGNOSIS — Z90.710 S/P TAH (TOTAL ABDOMINAL HYSTERECTOMY): ICD-10-CM

## 2024-11-17 DIAGNOSIS — D25.9 UTERINE LEIOMYOMA, UNSPECIFIED LOCATION: Primary | ICD-10-CM

## 2024-11-17 LAB
ANION GAP SERPL CALCULATED.3IONS-SCNC: 6 MMOL/L (ref 4–13)
B-HCG SERPL-ACNC: <0.6 MIU/ML (ref 0–5)
BASOPHILS # BLD AUTO: 0.05 THOUSANDS/ÂΜL (ref 0–0.1)
BASOPHILS NFR BLD AUTO: 1 % (ref 0–1)
BUN SERPL-MCNC: 11 MG/DL (ref 5–25)
CALCIUM SERPL-MCNC: 9 MG/DL (ref 8.4–10.2)
CHLORIDE SERPL-SCNC: 106 MMOL/L (ref 96–108)
CO2 SERPL-SCNC: 25 MMOL/L (ref 21–32)
CREAT SERPL-MCNC: 0.47 MG/DL (ref 0.6–1.3)
EOSINOPHIL # BLD AUTO: 0.14 THOUSAND/ÂΜL (ref 0–0.61)
EOSINOPHIL NFR BLD AUTO: 2 % (ref 0–6)
ERYTHROCYTE [DISTWIDTH] IN BLOOD BY AUTOMATED COUNT: 21.5 % (ref 11.6–15.1)
EXT PREGNANCY TEST URINE: NEGATIVE
EXT. CONTROL: NORMAL
GFR SERPL CREATININE-BSD FRML MDRD: 124 ML/MIN/1.73SQ M
GLUCOSE SERPL-MCNC: 91 MG/DL (ref 65–140)
HCT VFR BLD AUTO: 28.2 % (ref 34.8–46.1)
HGB BLD-MCNC: 8 G/DL (ref 11.5–15.4)
IMM GRANULOCYTES # BLD AUTO: 0.01 THOUSAND/UL (ref 0–0.2)
IMM GRANULOCYTES NFR BLD AUTO: 0 % (ref 0–2)
LYMPHOCYTES # BLD AUTO: 1.14 THOUSANDS/ÂΜL (ref 0.6–4.47)
LYMPHOCYTES NFR BLD AUTO: 19 % (ref 14–44)
MCH RBC QN AUTO: 20.1 PG (ref 26.8–34.3)
MCHC RBC AUTO-ENTMCNC: 28.4 G/DL (ref 31.4–37.4)
MCV RBC AUTO: 71 FL (ref 82–98)
MONOCYTES # BLD AUTO: 0.47 THOUSAND/ÂΜL (ref 0.17–1.22)
MONOCYTES NFR BLD AUTO: 8 % (ref 4–12)
NEUTROPHILS # BLD AUTO: 4.1 THOUSANDS/ÂΜL (ref 1.85–7.62)
NEUTS SEG NFR BLD AUTO: 70 % (ref 43–75)
NRBC BLD AUTO-RTO: 0 /100 WBCS
PLATELET # BLD AUTO: 507 THOUSANDS/UL (ref 149–390)
PMV BLD AUTO: 9.5 FL (ref 8.9–12.7)
POTASSIUM SERPL-SCNC: 3.8 MMOL/L (ref 3.5–5.3)
RBC # BLD AUTO: 3.99 MILLION/UL (ref 3.81–5.12)
SODIUM SERPL-SCNC: 137 MMOL/L (ref 135–147)
WBC # BLD AUTO: 5.91 THOUSAND/UL (ref 4.31–10.16)

## 2024-11-17 PROCEDURE — NC001 PR NO CHARGE: Performed by: OBSTETRICS & GYNECOLOGY

## 2024-11-17 PROCEDURE — 99222 1ST HOSP IP/OBS MODERATE 55: CPT | Performed by: OBSTETRICS & GYNECOLOGY

## 2024-11-17 PROCEDURE — 74177 CT ABD & PELVIS W/CONTRAST: CPT

## 2024-11-17 PROCEDURE — 71260 CT THORAX DX C+: CPT

## 2024-11-17 PROCEDURE — 80048 BASIC METABOLIC PNL TOTAL CA: CPT | Performed by: EMERGENCY MEDICINE

## 2024-11-17 PROCEDURE — 99285 EMERGENCY DEPT VISIT HI MDM: CPT | Performed by: EMERGENCY MEDICINE

## 2024-11-17 PROCEDURE — 96372 THER/PROPH/DIAG INJ SC/IM: CPT

## 2024-11-17 PROCEDURE — 84702 CHORIONIC GONADOTROPIN TEST: CPT

## 2024-11-17 PROCEDURE — 99284 EMERGENCY DEPT VISIT MOD MDM: CPT

## 2024-11-17 PROCEDURE — 36415 COLL VENOUS BLD VENIPUNCTURE: CPT | Performed by: EMERGENCY MEDICINE

## 2024-11-17 PROCEDURE — 81025 URINE PREGNANCY TEST: CPT | Performed by: EMERGENCY MEDICINE

## 2024-11-17 PROCEDURE — 85025 COMPLETE CBC W/AUTO DIFF WBC: CPT | Performed by: EMERGENCY MEDICINE

## 2024-11-17 RX ORDER — IBUPROFEN 600 MG/1
600 TABLET, FILM COATED ORAL EVERY 6 HOURS SCHEDULED
Status: DISCONTINUED | OUTPATIENT
Start: 2024-11-17 | End: 2024-11-18

## 2024-11-17 RX ORDER — KETOROLAC TROMETHAMINE 30 MG/ML
15 INJECTION, SOLUTION INTRAMUSCULAR; INTRAVENOUS ONCE
Status: COMPLETED | OUTPATIENT
Start: 2024-11-17 | End: 2024-11-17

## 2024-11-17 RX ORDER — ACETAMINOPHEN 325 MG/1
975 TABLET ORAL EVERY 6 HOURS SCHEDULED
Status: DISCONTINUED | OUTPATIENT
Start: 2024-11-17 | End: 2024-11-18

## 2024-11-17 RX ORDER — FERROUS GLUCONATE 324(38)MG
324 TABLET ORAL
Status: DISCONTINUED | OUTPATIENT
Start: 2024-11-18 | End: 2024-11-24 | Stop reason: HOSPADM

## 2024-11-17 RX ADMIN — IOHEXOL 100 ML: 350 INJECTION, SOLUTION INTRAVENOUS at 10:22

## 2024-11-17 RX ADMIN — IBUPROFEN 600 MG: 600 TABLET, FILM COATED ORAL at 21:05

## 2024-11-17 RX ADMIN — IRON SUCROSE 200 MG: 20 INJECTION, SOLUTION INTRAVENOUS at 15:52

## 2024-11-17 RX ADMIN — ACETAMINOPHEN 975 MG: 325 TABLET, FILM COATED ORAL at 21:05

## 2024-11-17 RX ADMIN — KETOROLAC TROMETHAMINE 15 MG: 30 INJECTION, SOLUTION INTRAMUSCULAR; INTRAVENOUS at 08:32

## 2024-11-17 NOTE — ASSESSMENT & PLAN NOTE
Patient completed high school  Lives with mom, dad, and older brother  Makes her own medical decisions but her dad helps her

## 2024-11-17 NOTE — ED ATTENDING ATTESTATION
11/17/2024  I, Taylor Christiansen MD, saw and evaluated the patient. I have discussed the patient with the resident/non-physician practitioner and agree with the resident's/non-physician practitioner's findings, Plan of Care, and MDM as documented in the resident's/non-physician practitioner's note, except where noted. All available labs and Radiology studies were reviewed.  I was present for key portions of any procedure(s) performed by the resident/non-physician practitioner and I was immediately available to provide assistance.       At this point I agree with the current assessment done in the Emergency Department.  I have conducted an independent evaluation of this patient a history and physical is as follows:  This is a 39-year-old female with history of leiomyoma of her uterus, scheduled for hysterectomy in December, coming in with increased pain.  Patient states that her uterus is getting bigger, and the pain is getting worse.  She is still able to eat and drink, but has poor appetite.  She is not having vaginal bleeding.  She is taking Tylenol for pain.  The patient states that her father has told her she should not take NSAIDs.  The patient's father also states that her abdomen is getting bigger.  Review of systems otherwise -12 systems reviewed.  On exam patient is hypertensive.  Her HEENT exam is nonfocal.  Neck is supple and nontender.  Her heart is regular without murmurs, rubs, gallops.  Lungs are clear with good air movement.  Her abdomen is distended, with a uterine fundus that is up at the patient's xiphoid and extends laterally.  Her extremities are intact.  She is neurologically nonfocal. MEDICAL DECISION MAKING    Number and Complexity of Problems  Differential diagnosis: Intra-abdominal mass, doubt bowel obstruction, doubt bleeding    Medical Decision Making Data  External documents reviewed: Patient's ultrasound from September 24 reviewed, patient's labs from 4 days ago reviewed, patient  does not require labs for CT scanning  My EKG interpretation:   My CT interpretation:   My X-ray interpretation:   My ultrasound interpretation:     CT chest abdomen pelvis w contrast   Final Result      Markedly enlarged uterus with numerous leiomyomas extending into the subhepatic space. The largest pedunculated subserosal leiomyomas extending into the subhepatic space and perisplenic region demonstrate large central areas of hypoattenuation, in    keeping with necrosis/degeneration. Possibility of sarcomatous degeneration is not entirely excluded.         Resident: YEN RODGERS I, the attending radiologist, have reviewed the images and agree with the final report above.      Workstation performed: [id number]             Labs Reviewed   CBC AND DIFFERENTIAL - Abnormal       Result Value Ref Range Status    WBC 5.91  4.31 - 10.16 Thousand/uL Final    RBC 3.99  3.81 - 5.12 Million/uL Final    Hemoglobin 8.0 (*) 11.5 - 15.4 g/dL Final    Hematocrit 28.2 (*) 34.8 - 46.1 % Final    MCV 71 (*) 82 - 98 fL Final    MCH 20.1 (*) 26.8 - 34.3 pg Final    MCHC 28.4 (*) 31.4 - 37.4 g/dL Final    RDW 21.5 (*) 11.6 - 15.1 % Final    MPV 9.5  8.9 - 12.7 fL Final    Platelets 507 (*) 149 - 390 Thousands/uL Final    nRBC 0  /100 WBCs Final    Segmented % 70  43 - 75 % Final    Immature Grans % 0  0 - 2 % Final    Lymphocytes % 19  14 - 44 % Final    Monocytes % 8  4 - 12 % Final    Eosinophils Relative 2  0 - 6 % Final    Basophils Relative 1  0 - 1 % Final    Absolute Neutrophils 4.10  1.85 - 7.62 Thousands/µL Final    Absolute Immature Grans 0.01  0.00 - 0.20 Thousand/uL Final    Absolute Lymphocytes 1.14  0.60 - 4.47 Thousands/µL Final    Absolute Monocytes 0.47  0.17 - 1.22 Thousand/µL Final    Eosinophils Absolute 0.14  0.00 - 0.61 Thousand/µL Final    Basophils Absolute 0.05  0.00 - 0.10 Thousands/µL Final   BASIC METABOLIC PANEL - Abnormal    Sodium 137  135 - 147 mmol/L Final    Potassium 3.8  3.5 - 5.3 mmol/L  Final    Chloride 106  96 - 108 mmol/L Final    CO2 25  21 - 32 mmol/L Final    ANION GAP 6  4 - 13 mmol/L Final    BUN 11  5 - 25 mg/dL Final    Creatinine 0.47 (*) 0.60 - 1.30 mg/dL Final    Comment: Standardized to IDMS reference method    Glucose 91  65 - 140 mg/dL Final    Comment: If the patient is fasting, the ADA then defines impaired fasting glucose as > 100 mg/dL and diabetes as > or equal to 123 mg/dL.    Calcium 9.0  8.4 - 10.2 mg/dL Final    eGFR 124  ml/min/1.73sq m Final    Narrative:     National Kidney Disease Foundation guidelines for Chronic Kidney Disease (CKD):     Stage 1 with normal or high GFR (GFR > 90 mL/min/1.73 square meters)    Stage 2 Mild CKD (GFR = 60-89 mL/min/1.73 square meters)    Stage 3A Moderate CKD (GFR = 45-59 mL/min/1.73 square meters)    Stage 3B Moderate CKD (GFR = 30-44 mL/min/1.73 square meters)    Stage 4 Severe CKD (GFR = 15-29 mL/min/1.73 square meters)    Stage 5 End Stage CKD (GFR <15 mL/min/1.73 square meters)  Note: GFR calculation is accurate only with a steady state creatinine   HCG, QUANTITATIVE - Normal    HCG, Quant <0.6  0 - 5 mIU/mL Final    Narrative:      Expected Ranges:    HCG results between 5.0 and 25.0 mIU/mL may be indicative of early pregnancy but should be interpreted in light of the total clinical presentation.    HCG can rise to detectable levels in shadia and post menopausal women (0-11.6 mIU/mL).     Approximate               Approximate HCG  Gestation age          Concentration ( mIU/mL)  _____________          ______________________   Weeks                      HCG values  0.2-1                       5-50  1-2                           2-3                         100-5000  3-4                         500-92228  4-5                         1000-23041  5-6                         [id number]  6-8                         [id number]  8-12                        [id number]     POCT PREGNANCY, URINE - Normal    EXT Preg Test, Ur Negative    Final    Control Valid   Final       Labs reviewed by me are significant for: Anemia    Clinical decision rules/scores are significant for:     Discussed case with:   Considered admission for:     Treatment and Disposition  ED course: Patient seen and examined.  Will plan to CT, will discuss with gynecology.  Patient with difficulty ambulating in the emergency department, difficulty urinating on her own.  Will plan to admit to the hospital  Shared decision making:   Code status:     ED Course         Critical Care Time  Procedures

## 2024-11-17 NOTE — CONSULTS
Consultation - Gynecologic Oncology  Tatiana Lopez 39 y.o. female MRN: [id number]  Unit/Bed#: ED 25 Encounter: [id number]      Inpatient consult to Gynecologic Oncology  Consult performed by: Naomi Davison MD  Consult ordered by: Taylor Christiansen MD          Chief Complaint   Patient presents with    Cyst     C/o of pain in her uterus, thinks her cysts are getting worse       Assessment & Plan     38yo with known fibroid uterus scheduled for TIM on 12/13 presenting with abdominal pain and increasing abdominal girth. Plan to admit for observation for pain control.      Fibroid Uterus  - Patient has only been taking Tylenol at home  - Counseled patient that Motrin may help her pain better than Tylenol and she should take both  - Hgb stable 8.0, consider iron infusion  - Follow up CT AP  - Plan to keep surgery scheduled on 12/13/24 with Dr. Angel for now  - Admit for observation in setting of pain control and to ensure patient can perform ADLs      History of Present Illness   Physician Requesting Consult: Taylor Christiansen MD  Reason for Consult / Principal Problem: abdominal pain, fibroid uterus  Subspeciality: Gynecologic Oncology    HPI: Tatiana Lopez is a 39 y.o. female who presents with increasing abdominal pain and abdominal girth. Patient has known extremely large fibroid uterus extending to her xyphoid, scheduled for TIM on 12/13/24 with Dr. Angel. Patient is still tolerating PO without nausea or vomiting, denies vaginal bleeding. She denies constipation or diarrhea. Her pain has improved following Toradol administration. Patient has only been taking Tylenol at home. Patient states she is now ready for a nap.      Review of Systems   Constitutional:  Negative for chills and fever.   HENT: Negative.     Respiratory:  Negative for cough and shortness of breath.    Cardiovascular:  Negative for chest pain.   Gastrointestinal:  Positive for abdominal pain. Negative for nausea and vomiting.    Genitourinary: Negative.  Negative for vaginal bleeding.   Musculoskeletal: Negative.    Neurological:  Negative for headaches.   Psychiatric/Behavioral: Negative.         Historical Information   History reviewed. No pertinent past medical history.  Past Surgical History:   Procedure Laterality Date    ANKLE FRACTURE SURGERY Left     per patient    REDUCTION MAMMAPLASTY Bilateral     SPINE SURGERY N/A     per patient    TONSILECTOMY AND ADNOIDECTOMY Bilateral      OB History    Para Term  AB Living   0 0 0 0 0 0   SAB IAB Ectopic Multiple Live Births   0 0 0 0 0     History reviewed. No pertinent family history.  Social History   Social History     Substance and Sexual Activity   Alcohol Use Yes    Comment: occasional     Social History     Substance and Sexual Activity   Drug Use Never     Social History     Tobacco Use   Smoking Status Never   Smokeless Tobacco Never       Meds/Allergies   No current facility-administered medications for this encounter.      Allergies   Allergen Reactions    Ceclor [Cefaclor]        Objective   Vitals: Blood pressure (!) 176/74, pulse 85, temperature (!) 97.4 °F (36.3 °C), temperature source Temporal, resp. rate 18, last menstrual period 10/29/2024, SpO2 97%. There is no height or weight on file to calculate BMI.    No intake or output data in the 24 hours ending 24 1126    Invasive Devices       Peripheral Intravenous Line  Duration             Peripheral IV 24 Right Antecubital <1 day                    Physical Exam  Constitutional:       General: She is not in acute distress.  HENT:      Head: Normocephalic and atraumatic.   Eyes:      Extraocular Movements: Extraocular movements intact.   Cardiovascular:      Rate and Rhythm: Normal rate and regular rhythm.      Heart sounds: Normal heart sounds.   Pulmonary:      Effort: Pulmonary effort is normal.   Abdominal:      Palpations: There is mass.      Tenderness: There is abdominal tenderness. There  is no guarding or rebound.      Comments: Mildly tender to palpation throughout abdomen   Musculoskeletal:      Right lower leg: No edema.      Left lower leg: No edema.   Skin:     General: Skin is warm and dry.   Neurological:      General: No focal deficit present.      Mental Status: She is alert.   Psychiatric:         Mood and Affect: Mood normal.         Lab Results:   Recent Results (from the past 24 hours)   CBC and differential    Collection Time: 11/17/24  8:47 AM   Result Value Ref Range    WBC 5.91 4.31 - 10.16 Thousand/uL    RBC 3.99 3.81 - 5.12 Million/uL    Hemoglobin 8.0 (L) 11.5 - 15.4 g/dL    Hematocrit 28.2 (L) 34.8 - 46.1 %    MCV 71 (L) 82 - 98 fL    MCH 20.1 (L) 26.8 - 34.3 pg    MCHC 28.4 (L) 31.4 - 37.4 g/dL    RDW 21.5 (H) 11.6 - 15.1 %    MPV 9.5 8.9 - 12.7 fL    Platelets 507 (H) 149 - 390 Thousands/uL    nRBC 0 /100 WBCs    Segmented % 70 43 - 75 %    Immature Grans % 0 0 - 2 %    Lymphocytes % 19 14 - 44 %    Monocytes % 8 4 - 12 %    Eosinophils Relative 2 0 - 6 %    Basophils Relative 1 0 - 1 %    Absolute Neutrophils 4.10 1.85 - 7.62 Thousands/µL    Absolute Immature Grans 0.01 0.00 - 0.20 Thousand/uL    Absolute Lymphocytes 1.14 0.60 - 4.47 Thousands/µL    Absolute Monocytes 0.47 0.17 - 1.22 Thousand/µL    Eosinophils Absolute 0.14 0.00 - 0.61 Thousand/µL    Basophils Absolute 0.05 0.00 - 0.10 Thousands/µL   Basic metabolic panel    Collection Time: 11/17/24  8:47 AM   Result Value Ref Range    Sodium 137 135 - 147 mmol/L    Potassium 3.8 3.5 - 5.3 mmol/L    Chloride 106 96 - 108 mmol/L    CO2 25 21 - 32 mmol/L    ANION GAP 6 4 - 13 mmol/L    BUN 11 5 - 25 mg/dL    Creatinine 0.47 (L) 0.60 - 1.30 mg/dL    Glucose 91 65 - 140 mg/dL    Calcium 9.0 8.4 - 10.2 mg/dL    eGFR 124 ml/min/1.73sq m   POCT pregnancy, urine    Collection Time: 11/17/24 10:07 AM   Result Value Ref Range    EXT Preg Test, Ur Negative     Control Valid          Code Status: Prior      Naomi Davison MD  OB-GYN  Resident  11/17/2024  11:26 AM

## 2024-11-17 NOTE — H&P
H&P Exam - Gynecology Oncology  Tatiana Lopez 39 y.o. female MRN: [id number]  Unit/Bed#: ED 25 Encounter: [id number]        Assessment & Plan   Fibroid uterus  Assessment & Plan   - Patient has only been taking Tylenol at home  - Counseled patient that Motrin may help her pain better than Tylenol and she should take both  - Hgb stable 8.0, will give venofer infusion  - Plan to keep surgery scheduled on 24 with Dr. Angel for now  - Admit for observation in the setting of pain control and to ensure patient can perform ADLs  - Attempt to move surgery earlier if symptoms worsen    Learning disability  Assessment & Plan  Patient completed high school  Lives with mom, dad, and older brother  Makes her own medical decisions but her dad helps her    Anemia  Assessment & Plan  Hgb stable on admission at 8.0  Venofer ordered on admission           History of Present Illness     HPI: See consult note.    Oncology History    No history exists.       Review of Systems   Constitutional:  Negative for chills and fever.   HENT: Negative.     Respiratory:  Negative for cough and shortness of breath.    Cardiovascular:  Negative for chest pain.   Gastrointestinal:  Positive for abdominal pain. Negative for nausea and vomiting.   Genitourinary: Negative.  Negative for vaginal bleeding.   Musculoskeletal: Negative.    Neurological:  Negative for headaches.   Psychiatric/Behavioral: Negative.         Historical Information   History reviewed. No pertinent past medical history.  Past Surgical History:   Procedure Laterality Date    ANKLE FRACTURE SURGERY Left     per patient    REDUCTION MAMMAPLASTY Bilateral     SPINE SURGERY N/A     per patient    TONSILECTOMY AND ADNOIDECTOMY Bilateral      OB History    Para Term  AB Living   0 0 0 0 0 0   SAB IAB Ectopic Multiple Live Births   0 0 0 0 0     History reviewed. No pertinent family history.  Social History   Social History     Substance and Sexual Activity    Alcohol Use Yes    Comment: occasional     Social History     Substance and Sexual Activity   Drug Use Never     Social History     Tobacco Use   Smoking Status Never   Smokeless Tobacco Never       Meds/Allergies   Not in a hospital admission.  Allergies   Allergen Reactions    Ceclor [Cefaclor]        Objective     /72   Pulse 76   Temp (!) 97.4 °F (36.3 °C) (Temporal)   Resp 18   LMP 10/29/2024 (Exact Date)   SpO2 97%     No intake/output data recorded.  No intake/output data recorded.    Lab Results   Component Value Date    WBC 5.91 11/17/2024    HGB 8.0 (L) 11/17/2024    HCT 28.2 (L) 11/17/2024    MCV 71 (L) 11/17/2024     (H) 11/17/2024       Lab Results   Component Value Date    CALCIUM 9.0 11/17/2024    K 3.8 11/17/2024    CO2 25 11/17/2024     11/17/2024    BUN 11 11/17/2024    CREATININE 0.47 (L) 11/17/2024       Physical Exam  Constitutional:       General: She is not in acute distress.  HENT:      Head: Normocephalic and atraumatic.   Eyes:      Extraocular Movements: Extraocular movements intact.   Cardiovascular:      Rate and Rhythm: Normal rate and regular rhythm.      Heart sounds: Normal heart sounds.   Pulmonary:      Effort: Pulmonary effort is normal.   Abdominal:      Palpations: There is mass.      Tenderness: There is abdominal tenderness. There is no guarding or rebound.      Comments: Mildly tender to palpation throughout abdomen   Musculoskeletal:      Right lower leg: No edema.      Left lower leg: No edema.   Skin:     General: Skin is warm and dry.   Neurological:      General: No focal deficit present.      Mental Status: She is alert.   Psychiatric:         Mood and Affect: Mood normal.          Code Status: Prior    Naomi Davison MD  11/17/2024  2:57 PM

## 2024-11-17 NOTE — ASSESSMENT & PLAN NOTE
- Tylenol and motrin for pain control  - Hgb stable 8.0, will give venofer infusion  - Plan to keep surgery scheduled on 12/13/24 with Dr. Angel for now  - Admit for observation in the setting of pain control and to ensure patient can perform ADLs  - Attempt to move surgery earlier if symptoms worsen

## 2024-11-18 LAB
ABO GROUP BLD: NORMAL
ANION GAP SERPL CALCULATED.3IONS-SCNC: 8 MMOL/L (ref 4–13)
BLD GP AB SCN SERPL QL: NEGATIVE
BUN SERPL-MCNC: 9 MG/DL (ref 5–25)
CALCIUM SERPL-MCNC: 8.6 MG/DL (ref 8.4–10.2)
CHLORIDE SERPL-SCNC: 107 MMOL/L (ref 96–108)
CO2 SERPL-SCNC: 24 MMOL/L (ref 21–32)
CREAT SERPL-MCNC: 0.47 MG/DL (ref 0.6–1.3)
ERYTHROCYTE [DISTWIDTH] IN BLOOD BY AUTOMATED COUNT: 21.2 % (ref 11.6–15.1)
GFR SERPL CREATININE-BSD FRML MDRD: 124 ML/MIN/1.73SQ M
GLUCOSE SERPL-MCNC: 82 MG/DL (ref 65–140)
HCT VFR BLD AUTO: 27.1 % (ref 34.8–46.1)
HGB BLD-MCNC: 7.5 G/DL (ref 11.5–15.4)
MAGNESIUM SERPL-MCNC: 1.9 MG/DL (ref 1.9–2.7)
MCH RBC QN AUTO: 19.9 PG (ref 26.8–34.3)
MCHC RBC AUTO-ENTMCNC: 27.7 G/DL (ref 31.4–37.4)
MCV RBC AUTO: 72 FL (ref 82–98)
PHOSPHATE SERPL-MCNC: 3.1 MG/DL (ref 2.7–4.5)
PLATELET # BLD AUTO: 480 THOUSANDS/UL (ref 149–390)
PMV BLD AUTO: 10 FL (ref 8.9–12.7)
POTASSIUM SERPL-SCNC: 3.5 MMOL/L (ref 3.5–5.3)
RBC # BLD AUTO: 3.77 MILLION/UL (ref 3.81–5.12)
RH BLD: POSITIVE
SODIUM SERPL-SCNC: 139 MMOL/L (ref 135–147)
SPECIMEN EXPIRATION DATE: NORMAL
WBC # BLD AUTO: 6.24 THOUSAND/UL (ref 4.31–10.16)

## 2024-11-18 PROCEDURE — 86850 RBC ANTIBODY SCREEN: CPT

## 2024-11-18 PROCEDURE — 86923 COMPATIBILITY TEST ELECTRIC: CPT

## 2024-11-18 PROCEDURE — 99232 SBSQ HOSP IP/OBS MODERATE 35: CPT | Performed by: OBSTETRICS & GYNECOLOGY

## 2024-11-18 PROCEDURE — 30233N1 TRANSFUSION OF NONAUTOLOGOUS RED BLOOD CELLS INTO PERIPHERAL VEIN, PERCUTANEOUS APPROACH: ICD-10-PCS | Performed by: OBSTETRICS & GYNECOLOGY

## 2024-11-18 PROCEDURE — 80048 BASIC METABOLIC PNL TOTAL CA: CPT

## 2024-11-18 PROCEDURE — 86901 BLOOD TYPING SEROLOGIC RH(D): CPT

## 2024-11-18 PROCEDURE — P9016 RBC LEUKOCYTES REDUCED: HCPCS

## 2024-11-18 PROCEDURE — 86900 BLOOD TYPING SEROLOGIC ABO: CPT

## 2024-11-18 PROCEDURE — 83735 ASSAY OF MAGNESIUM: CPT

## 2024-11-18 PROCEDURE — 84100 ASSAY OF PHOSPHORUS: CPT

## 2024-11-18 PROCEDURE — 85027 COMPLETE CBC AUTOMATED: CPT

## 2024-11-18 RX ORDER — IBUPROFEN 600 MG/1
600 TABLET, FILM COATED ORAL EVERY 6 HOURS SCHEDULED
Status: DISCONTINUED | OUTPATIENT
Start: 2024-11-18 | End: 2024-11-20

## 2024-11-18 RX ORDER — OXYCODONE HYDROCHLORIDE 5 MG/1
5 TABLET ORAL EVERY 4 HOURS PRN
Refills: 0 | Status: DISCONTINUED | OUTPATIENT
Start: 2024-11-18 | End: 2024-11-20

## 2024-11-18 RX ORDER — ACETAMINOPHEN 325 MG/1
975 TABLET ORAL EVERY 6 HOURS SCHEDULED
Status: DISCONTINUED | OUTPATIENT
Start: 2024-11-18 | End: 2024-11-20

## 2024-11-18 RX ORDER — SODIUM CHLORIDE, SODIUM LACTATE, POTASSIUM CHLORIDE, CALCIUM CHLORIDE 600; 310; 30; 20 MG/100ML; MG/100ML; MG/100ML; MG/100ML
75 INJECTION, SOLUTION INTRAVENOUS CONTINUOUS
Status: DISCONTINUED | OUTPATIENT
Start: 2024-11-18 | End: 2024-11-19

## 2024-11-18 RX ORDER — OXYCODONE HYDROCHLORIDE 10 MG/1
10 TABLET ORAL EVERY 4 HOURS PRN
Refills: 0 | Status: DISCONTINUED | OUTPATIENT
Start: 2024-11-18 | End: 2024-11-20

## 2024-11-18 RX ADMIN — SODIUM CHLORIDE, SODIUM LACTATE, POTASSIUM CHLORIDE, AND CALCIUM CHLORIDE 75 ML/HR: .6; .31; .03; .02 INJECTION, SOLUTION INTRAVENOUS at 14:30

## 2024-11-18 RX ADMIN — ACETAMINOPHEN 975 MG: 325 TABLET, FILM COATED ORAL at 02:57

## 2024-11-18 RX ADMIN — NORGESTREL AND ETHINYL ESTRADIOL 1 TABLET: KIT at 09:52

## 2024-11-18 RX ADMIN — ACETAMINOPHEN 975 MG: 325 TABLET, FILM COATED ORAL at 21:30

## 2024-11-18 RX ADMIN — IBUPROFEN 600 MG: 600 TABLET, FILM COATED ORAL at 02:57

## 2024-11-18 RX ADMIN — ACETAMINOPHEN 975 MG: 325 TABLET, FILM COATED ORAL at 09:51

## 2024-11-18 RX ADMIN — IBUPROFEN 600 MG: 600 TABLET, FILM COATED ORAL at 21:30

## 2024-11-18 RX ADMIN — IBUPROFEN 600 MG: 600 TABLET, FILM COATED ORAL at 09:51

## 2024-11-18 RX ADMIN — ACETAMINOPHEN 975 MG: 325 TABLET, FILM COATED ORAL at 14:31

## 2024-11-18 RX ADMIN — FERROUS GLUCONATE 324 MG: 324 TABLET ORAL at 14:38

## 2024-11-18 RX ADMIN — OXYCODONE HYDROCHLORIDE 5 MG: 5 TABLET ORAL at 22:07

## 2024-11-18 RX ADMIN — IBUPROFEN 600 MG: 600 TABLET, FILM COATED ORAL at 14:31

## 2024-11-18 RX ADMIN — IRON SUCROSE 200 MG: 20 INJECTION, SOLUTION INTRAVENOUS at 14:30

## 2024-11-18 NOTE — UTILIZATION REVIEW
Initial Clinical Review    Admission: Date/Time/Statement:   Admission Orders (From admission, onward)       Ordered        11/17/24 1447  Place in Observation  Once                          Orders Placed This Encounter   Procedures    Place in Observation     Standing Status:   Standing     Number of Occurrences:   1     Level of Care:   Med Surg [16]     ED Arrival Information       Expected   -    Arrival   11/17/2024 08:12    Acuity   Urgent              Means of arrival   Walk-In    Escorted by   Self    Service   GYN Oncology    Admission type   Emergency              Arrival complaint   Abdominal Pain             Chief Complaint   Patient presents with    Cyst     C/o of pain in her uterus, thinks her cysts are getting worse       Initial Presentation: 39 y.o. female who presents to ED as a walk-in with increasing abdominal pain and abdominal girth. Pt has known extremely large fibroid uterus extending to her xyphoid, scheduled for TIM on 12/13/24 with Dr. Angel. Pt is still tolerating PO without nausea or vomiting, denies vaginal bleeding. She denies constipation or diarrhea. Her pain has improved following Toradol.  Pt has only been taking Tylenol at home.  BP elevated on presentation. Abd mildly tender to palpation. Hgb 8.0.Toradol given x1 in ED.  Admitted under observation to MS unit for pain control - analgesics prn. Venofer. Monitor H/H. CT A/P pending. Plan to keep surgery scheduled on 12/13/24 for now       Date: 11/18   Day 2: pt is currently still experiencing the pain that initially brought her in.  She endorses that she is still having regular bowel and bladder function. Continue shruthi Tylenol and ibuprofen with prn oxy.         ED Treatment-Medication Administration from 11/17/2024 0812 to 11/17/2024 2003         Date/Time Order Dose Route Action     11/17/2024 0832 ketorolac (TORADOL) injection 15 mg 15 mg Intramuscular Given     11/17/2024 1552 iron sucrose (VENOFER) 200 mg in sodium chloride  0.9 % 100 mL IVPB 200 mg Intravenous New Bag         Scheduled Medications:  acetaminophen, 975 mg, Oral, Q6H SEAN  ferrous gluconate, 324 mg, Oral, Daily With Breakfast  ibuprofen, 600 mg, Oral, Q6H SEAN  iron sucrose, 200 mg, Intravenous, Daily  norgestrel-ethinyl estradiol, 1 tablet, Oral, Daily      Continuous IV Infusions:     PRN Meds:  oxyCODONE, 10 mg, Oral, Q4H PRN  oxyCODONE, 5 mg, Oral, Q4H PRN      ED Triage Vitals [11/17/24 0815]   Temperature Pulse Respirations Blood Pressure SpO2 Pain Score   (!) 97.4 °F (36.3 °C) 85 18 (!) 176/74 97 % 10 - Worst Possible Pain     Weight (last 2 days)       Date/Time Weight    11/18/24 0509 86.3 (190.26)            Vital Signs (last 3 days)       Date/Time Temp Pulse Resp BP MAP (mmHg) SpO2 O2 Device Patient Position - Orthostatic VS Frederick Coma Scale Score Pain    11/18/24 0739 -- -- -- -- -- -- -- -- 15 --    11/18/24 07:30:28 98.4 °F (36.9 °C) 96 16 141/92 108 97 % -- -- -- --    11/18/24 0257 -- -- -- -- -- -- -- -- -- 8    11/18/24 0100 -- -- -- -- -- -- -- -- -- 6    11/17/24 21:57:01 98.7 °F (37.1 °C) 81 18 144/92 109 96 % None (Room air) Lying 15 --    11/17/24 20:21:50 98.8 °F (37.1 °C) 87 -- 132/69 90 96 % -- -- -- --    11/17/24 1500 -- 78 18 164/68 -- 99 % None (Room air) -- -- 6    11/17/24 1400 -- 82 18 169/70 -- 98 % -- -- -- --    11/17/24 1300 -- 74 20 172/74 -- 97 % -- -- -- --    11/17/24 1230 -- 76 18 170/72 -- 97 % -- -- -- 9    11/17/24 1200 -- 70 -- -- -- 97 % -- -- -- --    11/17/24 1100 -- 74 -- 174/76 -- 97 % -- -- -- --    11/17/24 0832 -- -- -- -- -- -- -- -- -- 10 - Worst Possible Pain    11/17/24 0830 -- -- -- -- -- -- -- -- 15 --    11/17/24 0815 97.4 °F (36.3 °C) 85 18 176/74 106 97 % None (Room air) -- -- 10 - Worst Possible Pain              Pertinent Labs/Diagnostic Test Results:   Radiology:  CT chest abdomen pelvis w contrast   Final Interpretation by Juan Boss MD (11/17 0101)      Markedly enlarged uterus with numerous  leiomyomas extending into the subhepatic space. The largest pedunculated subserosal leiomyomas extending into the subhepatic space and perisplenic region demonstrate large central areas of hypoattenuation, in    keeping with necrosis/degeneration. Possibility of sarcomatous degeneration is not entirely excluded.         Resident: YEN RODGERS I, the attending radiologist, have reviewed the images and agree with the final report above.      Workstation performed: [id number]               Results from last 7 days   Lab Units 11/18/24  0602 11/17/24  0847   WBC Thousand/uL 6.24 5.91   HEMOGLOBIN g/dL 7.5* 8.0*   HEMATOCRIT % 27.1* 28.2*   PLATELETS Thousands/uL 480* 507*   TOTAL NEUT ABS Thousands/µL  --  4.10     Results from last 7 days   Lab Units 11/18/24  0602 11/17/24  0847 11/12/24  1039   SODIUM mmol/L 139 137 139   POTASSIUM mmol/L 3.5 3.8 3.9   CHLORIDE mmol/L 107 106 106   CO2 mmol/L 24 25 25   ANION GAP mmol/L 8 6 8   BUN mg/dL 9 11 7   CREATININE mg/dL 0.47* 0.47* 0.46*   EGFR ml/min/1.73sq m 124 124 125   CALCIUM mg/dL 8.6 9.0 8.9   MAGNESIUM mg/dL 1.9  --   --    PHOSPHORUS mg/dL 3.1  --   --        Results from last 7 days   Lab Units 11/18/24  0602 11/17/24  0847   GLUCOSE RANDOM mg/dL 82 91               History reviewed. No pertinent past medical history.  Present on Admission:   Anemia   Learning disability   Fibroid uterus      Admitting Diagnosis: Abdominal pain [R10.9]  Uterine leiomyoma, unspecified location [D25.9]  Age/Sex: 39 y.o. female    Network Utilization Review Department  ATTENTION: Please call with any questions or concerns to [phone] and carefully listen to the prompts so that you are directed to the right person. All voicemails are confidential.   For Discharge needs, contact Care Management DC Support Team at [phone] opt. 2  Send all requests for admission clinical reviews, approved or denied determinations and any other requests to dedicated fax number below  belonging to the campus where the patient is receiving treatment. List of dedicated fax numbers for the Facilities:  FACILITY NAME UR FAX NUMBER   ADMISSION DENIALS (Administrative/Medical Necessity) [phone]   DISCHARGE SUPPORT TEAM (NETWORK) [phone]   PARENT CHILD HEALTH (Maternity/NICU/Pediatrics) [phone]   [facility] [phone]   [facility] [phone]   [facility] [phone]   [facility] [phone]   [facility] [phone]   [facility] [phone]   [facility] [phone]   [facility] [phone]   [facility] [phone]   [facility] [phone]   [facility] [phone]   [facility] [phone]

## 2024-11-18 NOTE — PLAN OF CARE
Problem: PAIN - ADULT  Goal: Verbalizes/displays adequate comfort level or baseline comfort level  Description: Interventions:  - Encourage patient to monitor pain and request assistance  - Assess pain using appropriate pain scale  - Administer analgesics based on type and severity of pain and evaluate response  - Implement non-pharmacological measures as appropriate and evaluate response  - Consider cultural and social influences on pain and pain management  - Notify physician/advanced practitioner if interventions unsuccessful or patient reports new pain  Outcome: Progressing     Problem: INFECTION - ADULT  Goal: Absence or prevention of progression during hospitalization  Description: INTERVENTIONS:  - Assess and monitor for signs and symptoms of infection  - Monitor lab/diagnostic results  - Monitor all insertion sites, i.e. indwelling lines, tubes, and drains  - Monitor endotracheal if appropriate and nasal secretions for changes in amount and color  - [place] appropriate cooling/warming therapies per order  - Administer medications as ordered  - Instruct and encourage patient and family to use good hand hygiene technique  - Identify and instruct in appropriate isolation precautions for identified infection/condition  Outcome: Progressing     Problem: SAFETY ADULT  Goal: Patient will remain free of falls  Description: INTERVENTIONS:  - Educate patient/family on patient safety including physical limitations  - Instruct patient to call for assistance with activity   - Consult OT/PT to assist with strengthening/mobility   - Keep Call bell within reach  - Keep bed low and locked with side rails adjusted as appropriate  - Keep care items and personal belongings within reach  - Initiate and maintain comfort rounds  - Make Fall Risk Sign visible to staff  - Offer Toileting every 2 Hours, in advance of need  - Initiate/Maintain alarm  - Obtain necessary fall risk management equipment:   - Apply yellow socks and  bracelet for high fall risk patients  - Consider moving patient to room near nurses station  Outcome: Progressing  Goal: Maintain or return to baseline ADL function  Description: INTERVENTIONS:  -  Assess patient's ability to carry out ADLs; assess patient's baseline for ADL function and identify physical deficits which impact ability to perform ADLs (bathing, care of mouth/teeth, toileting, grooming, dressing, etc.)  - Assess/evaluate cause of self-care deficits   - Assess range of motion  - Assess patient's mobility; develop plan if impaired  - Assess patient's need for assistive devices and provide as appropriate  - Encourage maximum independence but intervene and supervise when necessary  - Involve family in performance of ADLs  - Assess for home care needs following discharge   - Consider OT consult to assist with ADL evaluation and planning for discharge  - Provide patient education as appropriate  Outcome: Progressing     Problem: DISCHARGE PLANNING  Goal: Discharge to home or other facility with appropriate resources  Description: INTERVENTIONS:  - Identify barriers to discharge w/patient and caregiver  - Arrange for needed discharge resources and transportation as appropriate  - Identify discharge learning needs (meds, wound care, etc.)  - Arrange for interpretive services to assist at discharge as needed  - Refer to Case Management Department for coordinating discharge planning if the patient needs post-hospital services based on physician/advanced practitioner order or complex needs related to functional status, cognitive ability, or social support system  Outcome: Progressing     Problem: Knowledge Deficit  Goal: Patient/family/caregiver demonstrates understanding of disease process, treatment plan, medications, and discharge instructions  Description: Complete learning assessment and assess knowledge base.  Interventions:  - Provide teaching at level of understanding  - Provide teaching via preferred  learning methods  Outcome: Progressing     Problem: GASTROINTESTINAL - ADULT  Goal: Minimal or absence of nausea and/or vomiting  Description: INTERVENTIONS:  - Administer IV fluids if ordered to ensure adequate hydration  - Maintain NPO status until nausea and vomiting are resolved  - Nasogastric tube if ordered  - Administer ordered antiemetic medications as needed  - Provide nonpharmacologic comfort measures as appropriate  - Advance diet as tolerated, if ordered  - Consider nutrition services referral to assist patient with adequate nutrition and appropriate food choices  Outcome: Progressing  Goal: Maintains or returns to baseline bowel function  Description: INTERVENTIONS:  - Assess bowel function  - Encourage oral fluids to ensure adequate hydration  - Administer IV fluids if ordered to ensure adequate hydration  - Administer ordered medications as needed  - Encourage mobilization and activity  - Consider nutritional services referral to assist patient with adequate nutrition and appropriate food choices  Outcome: Progressing  Goal: Maintains adequate nutritional intake  Description: INTERVENTIONS:  - Monitor percentage of each meal consumed  - Identify factors contributing to decreased intake, treat as appropriate  - Assist with meals as needed  - Monitor I&O, weight, and lab values if indicated  - Obtain nutrition services referral as needed  Outcome: Progressing

## 2024-11-18 NOTE — PROGRESS NOTES
Progress Note - GYN Oncology   Name: Tatiana Lopez 39 y.o. female I MRN: [id number]  [facility] I Date of Admission: 11/17/2024   Date of Service: 11/18/2024 I Hospital Day: 0     Assessment & Plan  Anemia  Hgb stable on admission at 8.0  Venofer ordered on admission  Learning disability  Patient completed high school  Lives with mom, dad, and older brother  Makes her own medical decisions but her dad helps her  Fibroid uterus   - Tylenol and motrin for pain control  - Hgb stable 8.0, will give venofer infusion  - Plan to keep surgery scheduled on 12/13/24 with Dr. Angel for now  - Admit for observation in the setting of pain control and to ensure patient can perform ADLs  - Attempt to move surgery earlier if symptoms worsen    Subjective:    Tatiana Lopez is currently still experiencing the pain that initially brought her in.  She endorses that she is still having regular bowel and bladder function.  She says that in the past she has had vaginal bleeding but is not having any currently.  We discussed that previously she had been seen outpatient with plans for surgery with Dr. Angel next month.  Discussed that surgery may be considered this admission but that is not a definite plan yet.  Patient made n.p.o. prior to evaluation by Dr. Farley at this morning.        Objective:  /92 (BP Location: Right arm)   Pulse 81   Temp 98.7 °F (37.1 °C) (Oral)   Resp 18   Wt 86.3 kg (190 lb 4.1 oz)   LMP 10/29/2024 (Exact Date)   SpO2 96%   BMI 32.66 kg/m²     No intake/output data recorded.  I/O this shift:  In: 360 [P.O.:360]  Out: -     Lab Results   Component Value Date    WBC 5.91 11/17/2024    HGB 8.0 (L) 11/17/2024    HCT 28.2 (L) 11/17/2024    MCV 71 (L) 11/17/2024     (H) 11/17/2024       Lab Results   Component Value Date    CALCIUM 9.0 11/17/2024    K 3.8 11/17/2024    CO2 25 11/17/2024     11/17/2024    BUN 11 11/17/2024    CREATININE 0.47 (L) 11/17/2024           Physical  Exam  Constitutional:       General: She is not in acute distress.     Appearance: Normal appearance.   HENT:      Head: Normocephalic and atraumatic.   Cardiovascular:      Rate and Rhythm: Normal rate.      Pulses: Normal pulses.   Pulmonary:      Effort: Pulmonary effort is normal. No respiratory distress.      Breath sounds: Normal breath sounds.   Abdominal:      General: Abdomen is flat.      Palpations: Abdomen is soft.      Comments: Uterine fundus palpable at the level of the xiphoid    Skin:     General: Skin is warm and dry.   Neurological:      General: No focal deficit present.      Mental Status: She is alert.   Psychiatric:         Mood and Affect: Mood normal.         Behavior: Behavior normal.           Linnea Holguin MD  11/18/2024  5:57 AM

## 2024-11-18 NOTE — CASE MANAGEMENT
Case Management Assessment & Discharge Planning Note    Patient name Tatiana Lopez  Location [facility] 934/[facility] 934-01 MRN [id number]  : 1985 Date 2024       Current Admission Date: 2024  Current Admission Diagnosis:Anemia   Patient Active Problem List    Diagnosis Date Noted Date Diagnosed    Fibroid uterus 2024     Iron deficiency anemia due to chronic blood loss 2024     Learning disability 2024     Anemia 2024     Overweight 2015     Family history of diabetes mellitus 2015       LOS (days): 0  Geometric Mean LOS (GMLOS) (days):   Days to GMLOS:     OBJECTIVE:              Current admission status: Observation       Preferred Pharmacy:   UNKNOWN - FOLLOW UP PRIOR TO DISCHARGE TO E-PRESCRIBE  No address on file      [facility] [address] [place] [address] - [facility] [address] [address]  Phone: [phone] Fax: [phone]    Primary Care Provider: Sonido Desir MD    Primary Insurance: [email or web address]  Secondary Insurance:     ASSESSMENT:  Active Health Care Proxies    There are no active Health Care Proxies on file.                 Readmission Root Cause  30 Day Readmission: No    Patient Information  Admitted from:: Home  Mental Status: Alert  During Assessment patient was accompanied by: Not accompanied during assessment  Assessment information provided by:: Patient  Primary Caregiver: Self  Support Systems: Self, Family members  County of Residence: [place]  What city do you live in?: [place]  Home entry access options. Select all that apply.: Stairs  Number of steps to enter home.: 1  Do the steps have railings?: Yes  Type of Current Residence: 2 story home  Upon entering residence, is there a bedroom on the main floor (no further steps)?: No  A bedroom is located on the following floor levels of residence (select all that apply):: 2nd Floor  Upon entering residence, is there a bathroom on the main floor (no  further steps)?: Yes  Number of steps to 2nd floor from main floor: One Flight  Living Arrangements: Lives w/ Family members, Lives w/ Parent(s)  Is patient a ?: No    Activities of Daily Living Prior to Admission  Functional Status: Independent  Completes ADLs independently?: Yes  Ambulates independently?: Yes  Does patient use assisted devices?: No  Does patient currently own DME?: No  Does patient have a history of Outpatient Therapy (PT/OT)?: Yes  Does the patient have a history of Short-Term Rehab?: No  Does patient have a history of HHC?: No  Does patient currently have HHC?: No         Patient Information Continued  Income Source: SSI/SSD  Does patient have prescription coverage?: Yes  Does patient receive dialysis treatments?: No  Does patient have a history of substance abuse?: No  Does patient have a history of Mental Health Diagnosis?: No         Means of Transportation  Means of Transport to Appts:: Family transport          DISCHARGE DETAILS:    Discharge planning discussed with:: pt at bedside  [place] of Choice: Yes  Comments - Freedom of Choice: Discussed FOC  CM contacted family/caregiver?: Yes  Were Treatment Team discharge recommendations reviewed with patient/caregiver?: Yes  Did patient/caregiver verbalize understanding of patient care needs?: Yes  Were patient/caregiver advised of the risks associated with not following Treatment Team discharge recommendations?: Yes    Contacts  Patient Contacts: Alec Lopez (Father)  [phone]  Relationship to Patient:: Family  Contact Method: Phone  Phone Number: [phone]  Reason/Outcome: Continuity of Care, Emergency Contact, Discharge Planning    Requested Home Health Care         Is the patient interested in HHC at discharge?: No    DME Referral Provided  Referral made for DME?: No         Would you like to participate in our [facility] service program?  : No - Declined    Treatment Team Recommendation: Home  Discharge Destination  Plan:: Home  Transport at Discharge : Family                                      Additional Comments: CM introduced herself and role and completed initial assessment with pt at bedside. Pt stated she is independent at baseline with ADLS/IADLS. Pt stated she lives with her parents in a 2 story home. Pt stated [facility]. CM will continue to follow as needed.

## 2024-11-18 NOTE — QUICK NOTE
Spoke with patient about plan for surgery on Wednesday with Dr. Angel and plan to give blood today due to her low blood count and anticipated blood loss.   We discussed the low risk of disease from blood and the slightly higher risk of transfusion reaction.   She had the opportunity to ask questions and signed consent.   At Gianfranco'e request, I also called her father Glenn and gave him an update on plan for both blood transfusion and surgery.   He had no questions a this time.     Linnea Holguin MD  OBGYN PGY-3  11/18/2024 11:43 AM

## 2024-11-18 NOTE — UTILIZATION REVIEW
Initial Clinical Review    OBS 11/17 @       Admission: Date/Time/Statement:   Admission Orders (From admission, onward)       Ordered        11/18/24 1814  INPATIENT ADMISSION  Once            11/17/24 1447  Place in Observation  Once                          Orders Placed This Encounter   Procedures    INPATIENT ADMISSION     Standing Status:   Standing     Number of Occurrences:   1     Level of Care:   Med Surg [16]     Estimated length of stay:   More than 2 Midnights     Certification:   I certify that inpatient services are medically necessary for this patient for a duration of greater than two midnights. See H&P and MD Progress Notes for additional information about the patient's course of treatment.     ED Arrival Information       Expected   -    Arrival   11/17/2024 08:12    Acuity   Urgent              Means of arrival   Walk-In    Escorted by   Self    Service   GYN Oncology    Admission type   Emergency              Arrival complaint   Abdominal Pain             Chief Complaint   Patient presents with    Cyst     C/o of pain in her uterus, thinks her cysts are getting worse       Initial Presentation: 39 y.o. female who presents to ED as a walk-in with increasing abdominal pain and abdominal girth. Pt has known extremely large fibroid uterus extending to her xyphoid, scheduled for TIM on 12/13/24 with Dr. Angel. Pt is still tolerating PO without nausea or vomiting, denies vaginal bleeding. She denies constipation or diarrhea. Her pain has improved following Toradol.  Pt has only been taking Tylenol at home.  BP elevated on presentation. Abd mildly tender to palpation. Hgb 8.0.Toradol given x1 in ED.  Admitted under observation to MS unit for pain control - analgesics prn. Venofer. Monitor H/H. CT A/P pending. Plan to keep surgery scheduled on 12/13/24 for now       Date: 11/18   Day 2: pt is currently still experiencing the pain that initially brought her in.  She endorses that she is still having  regular bowel and bladder function. Continue shruthi Tylenol and ibuprofen with prn oxy.         ED Treatment-Medication Administration from 11/17/2024 0812 to 11/17/2024 2003         Date/Time Order Dose Route Action     11/17/2024 0832 ketorolac (TORADOL) injection 15 mg 15 mg Intramuscular Given     11/17/2024 1552 iron sucrose (VENOFER) 200 mg in sodium chloride 0.9 % 100 mL IVPB 200 mg Intravenous New Bag         Scheduled Medications:  acetaminophen, 975 mg, Oral, Q6H SHRUTHI  ferrous gluconate, 324 mg, Oral, Daily With Breakfast  ibuprofen, 600 mg, Oral, Q6H SHRUTHI  iron sucrose, 200 mg, Intravenous, Daily  norgestrel-ethinyl estradiol, 1 tablet, Oral, Daily      Continuous IV Infusions:  lactated ringers, 75 mL/hr, Intravenous, Continuous      PRN Meds:  oxyCODONE, 10 mg, Oral, Q4H PRN  oxyCODONE, 5 mg, Oral, Q4H PRN      ED Triage Vitals [11/17/24 0815]   Temperature Pulse Respirations Blood Pressure SpO2 Pain Score   (!) 97.4 °F (36.3 °C) 85 18 (!) 176/74 97 % 10 - Worst Possible Pain     Weight (last 2 days)       Date/Time Weight    11/18/24 0509 86.3 (190.26)            Vital Signs (last 3 days)       Date/Time Temp Pulse Resp BP MAP (mmHg) SpO2 O2 Device Patient Position - Orthostatic VS [place] Coma Scale Score Pain    11/19/24 0814 -- -- -- -- -- -- -- -- -- 10 - Worst Possible Pain    11/19/24 0813 -- -- -- -- -- -- -- -- -- 10 - Worst Possible Pain    11/19/24 07:56:55 98.5 °F (36.9 °C) 77 18 136/83 101 96 % -- -- -- --    11/19/24 02:32:17 97.7 °F (36.5 °C) 65 -- 151/87 108 96 % -- -- -- --    11/19/24 00:42:24 98.1 °F (36.7 °C) 67 16 145/88 107 96 % -- -- -- --    11/19/24 00:05:40 98.4 °F (36.9 °C) 73 -- 143/91 108 96 % -- -- -- --    11/18/24 2345 -- -- 16 -- -- -- -- -- -- --    11/18/24 23:34:08 98.3 °F (36.8 °C) 79 -- 144/93 110 96 % -- -- -- --    11/18/24 22:56:52 98.6 °F (37 °C) 79 -- 151/97 115 95 % -- -- -- --    11/18/24 2207 -- -- -- -- -- -- -- -- -- 8    11/18/24 22:03:36 98.2 °F (36.8 °C) 78  -- 161/97 118 96 % -- -- -- --    11/18/24 2130 -- -- -- -- -- -- -- -- -- 8 11/18/24 20:48:51 98.5 °F (36.9 °C) 73 -- 163/96 118 97 % -- -- -- --    11/18/24 20:10:27 98.3 °F (36.8 °C) 83 -- 165/98 120 95 % -- -- -- --    11/18/24 2006 -- -- -- -- -- -- -- -- -- 8 11/18/24 19:35:58 98.6 °F (37 °C) 81 18 181/103 129 98 % -- -- -- --    11/18/24 15:55:22 98.6 °F (37 °C) 76 -- 143/91 108 97 % -- -- -- --    11/18/24 0951 -- -- -- -- -- -- -- -- -- 8 11/18/24 0739 -- -- -- -- -- -- -- -- 15 --    11/18/24 07:30:28 98.4 °F (36.9 °C) 96 16 141/92 108 97 % -- -- -- --    11/18/24 0257 -- -- -- -- -- -- -- -- -- 8 11/18/24 0100 -- -- -- -- -- -- -- -- -- 6 11/17/24 21:57:01 98.7 °F (37.1 °C) 81 18 144/92 109 96 % None (Room air) Lying 15 --    11/17/24 20:21:50 98.8 °F (37.1 °C) 87 -- 132/69 90 96 % -- -- -- --    11/17/24 1500 -- 78 18 164/68 -- 99 % None (Room air) -- -- 6    11/17/24 1400 -- 82 18 169/70 -- 98 % -- -- -- --    11/17/24 1300 -- 74 20 172/74 -- 97 % -- -- -- --    11/17/24 1230 -- 76 18 170/72 -- 97 % -- -- -- 9    11/17/24 1200 -- 70 -- -- -- 97 % -- -- -- --    11/17/24 1100 -- 74 -- 174/76 -- 97 % -- -- -- --    11/17/24 0832 -- -- -- -- -- -- -- -- -- 10 - Worst Possible Pain    11/17/24 0830 -- -- -- -- -- -- -- -- 15 --    11/17/24 0815 97.4 °F (36.3 °C) 85 18 176/74 106 97 % None (Room air) -- -- 10 - Worst Possible Pain              Pertinent Labs/Diagnostic Test Results:   Radiology:  CT chest abdomen pelvis w contrast   Final Interpretation by Juan Boss MD (11/17 1301)      Markedly enlarged uterus with numerous leiomyomas extending into the subhepatic space. The largest pedunculated subserosal leiomyomas extending into the subhepatic space and perisplenic region demonstrate large central areas of hypoattenuation, in    keeping with necrosis/degeneration. Possibility of sarcomatous degeneration is not entirely excluded.         Resident: YEN RODGERS I, the  attending radiologist, have reviewed the images and agree with the final report above.      Workstation performed: [id number]               Results from last 7 days   Lab Units 11/19/24  0606 11/18/24  0602 11/17/24  0847   WBC Thousand/uL 8.64 6.24 5.91   HEMOGLOBIN g/dL 9.3* 7.5* 8.0*   HEMATOCRIT % 33.0* 27.1* 28.2*   PLATELETS Thousands/uL 490* 480* 507*   TOTAL NEUT ABS Thousands/µL  --   --  4.10     Results from last 7 days   Lab Units 11/19/24  0606 11/18/24  0602 11/17/24  0847 11/12/24  1039   SODIUM mmol/L 138 139 137 139   POTASSIUM mmol/L 3.8 3.5 3.8 3.9   CHLORIDE mmol/L 106 107 106 106   CO2 mmol/L 23 24 25 25   ANION GAP mmol/L 9 8 6 8   BUN mg/dL 10 9 11 7   CREATININE mg/dL 0.59* 0.47* 0.47* 0.46*   EGFR ml/min/1.73sq m 115 124 124 125   CALCIUM mg/dL 8.4 8.6 9.0 8.9   MAGNESIUM mg/dL 1.9 1.9  --   --    PHOSPHORUS mg/dL 3.1 3.1  --   --        Results from last 7 days   Lab Units 11/19/24  0606 11/18/24  0602 11/17/24  0847   GLUCOSE RANDOM mg/dL 83 82 91               History reviewed. No pertinent past medical history.  Present on Admission:   Anemia   Learning disability   Fibroid uterus      Admitting Diagnosis: Abdominal pain [R10.9]  Uterine leiomyoma, unspecified location [D25.9]  Age/Sex: 39 y.o. female    Network Utilization Review Department  ATTENTION: Please call with any questions or concerns to [phone] and carefully listen to the prompts so that you are directed to the right person. All voicemails are confidential.   For Discharge needs, contact Care Management DC Support Team at [phone] opt. 2  Send all requests for admission clinical reviews, approved or denied determinations and any other requests to dedicated fax number below belonging to the campus where the patient is receiving treatment. List of dedicated fax numbers for the Facilities:  FACILITY NAME UR FAX NUMBER   ADMISSION DENIALS (Administrative/Medical Necessity) [phone]   DISCHARGE SUPPORT TEAM  (NETWORK) [phone]   PARENT CHILD HEALTH (Maternity/NICU/Pediatrics) [phone]   [facility] [phone]   [facility] [phone]   [facility] [phone]   [facility] [phone]   [facility] [phone]   [facility] [phone]   [facility] [phone]   [facility] [phone]   [facility] [phone]   [facility] [phone]   [facility] [phone]   [facility] [phone]

## 2024-11-19 ENCOUNTER — APPOINTMENT (INPATIENT)
Dept: RADIOLOGY | Facility: HOSPITAL | Age: 39
DRG: 743 | End: 2024-11-19
Payer: COMMERCIAL

## 2024-11-19 ENCOUNTER — ANESTHESIA EVENT (INPATIENT)
Dept: PERIOP | Facility: HOSPITAL | Age: 39
DRG: 743 | End: 2024-11-19
Payer: COMMERCIAL

## 2024-11-19 LAB
ABO GROUP BLD BPU: NORMAL
ABO GROUP BLD BPU: NORMAL
ANION GAP SERPL CALCULATED.3IONS-SCNC: 9 MMOL/L (ref 4–13)
ATRIAL RATE: 82 BPM
BPU ID: NORMAL
BPU ID: NORMAL
BUN SERPL-MCNC: 10 MG/DL (ref 5–25)
CALCIUM SERPL-MCNC: 8.4 MG/DL (ref 8.4–10.2)
CHLORIDE SERPL-SCNC: 106 MMOL/L (ref 96–108)
CO2 SERPL-SCNC: 23 MMOL/L (ref 21–32)
CREAT SERPL-MCNC: 0.59 MG/DL (ref 0.6–1.3)
CROSSMATCH: NORMAL
CROSSMATCH: NORMAL
ERYTHROCYTE [DISTWIDTH] IN BLOOD BY AUTOMATED COUNT: 22.2 % (ref 11.6–15.1)
GFR SERPL CREATININE-BSD FRML MDRD: 115 ML/MIN/1.73SQ M
GLUCOSE SERPL-MCNC: 83 MG/DL (ref 65–140)
HCT VFR BLD AUTO: 33 % (ref 34.8–46.1)
HGB BLD-MCNC: 9.3 G/DL (ref 11.5–15.4)
MAGNESIUM SERPL-MCNC: 1.9 MG/DL (ref 1.9–2.7)
MCH RBC QN AUTO: 20.9 PG (ref 26.8–34.3)
MCHC RBC AUTO-ENTMCNC: 28.2 G/DL (ref 31.4–37.4)
MCV RBC AUTO: 74 FL (ref 82–98)
P AXIS: 32 DEGREES
PHOSPHATE SERPL-MCNC: 3.1 MG/DL (ref 2.7–4.5)
PLATELET # BLD AUTO: 490 THOUSANDS/UL (ref 149–390)
PMV BLD AUTO: 9.9 FL (ref 8.9–12.7)
POTASSIUM SERPL-SCNC: 3.8 MMOL/L (ref 3.5–5.3)
PR INTERVAL: 162 MS
QRS AXIS: -52 DEGREES
QRSD INTERVAL: 88 MS
QT INTERVAL: 392 MS
QTC INTERVAL: 458 MS
RBC # BLD AUTO: 4.44 MILLION/UL (ref 3.81–5.12)
SODIUM SERPL-SCNC: 138 MMOL/L (ref 135–147)
T WAVE AXIS: 17 DEGREES
UNIT DISPENSE STATUS: NORMAL
UNIT DISPENSE STATUS: NORMAL
UNIT PRODUCT CODE: NORMAL
UNIT PRODUCT CODE: NORMAL
UNIT PRODUCT VOLUME: 350 ML
UNIT PRODUCT VOLUME: 350 ML
UNIT RH: NORMAL
UNIT RH: NORMAL
VENTRICULAR RATE: 82 BPM
WBC # BLD AUTO: 8.64 THOUSAND/UL (ref 4.31–10.16)

## 2024-11-19 PROCEDURE — 83735 ASSAY OF MAGNESIUM: CPT

## 2024-11-19 PROCEDURE — 99232 SBSQ HOSP IP/OBS MODERATE 35: CPT | Performed by: OBSTETRICS & GYNECOLOGY

## 2024-11-19 PROCEDURE — 80048 BASIC METABOLIC PNL TOTAL CA: CPT

## 2024-11-19 PROCEDURE — 93005 ELECTROCARDIOGRAM TRACING: CPT

## 2024-11-19 PROCEDURE — 71045 X-RAY EXAM CHEST 1 VIEW: CPT

## 2024-11-19 PROCEDURE — 85027 COMPLETE CBC AUTOMATED: CPT

## 2024-11-19 PROCEDURE — 84100 ASSAY OF PHOSPHORUS: CPT

## 2024-11-19 PROCEDURE — 93010 ELECTROCARDIOGRAM REPORT: CPT | Performed by: INTERNAL MEDICINE

## 2024-11-19 RX ADMIN — ACETAMINOPHEN 975 MG: 325 TABLET, FILM COATED ORAL at 21:17

## 2024-11-19 RX ADMIN — NORGESTREL AND ETHINYL ESTRADIOL 1 TABLET: KIT at 08:14

## 2024-11-19 RX ADMIN — SODIUM CHLORIDE, SODIUM LACTATE, POTASSIUM CHLORIDE, AND CALCIUM CHLORIDE 75 ML/HR: .6; .31; .03; .02 INJECTION, SOLUTION INTRAVENOUS at 08:13

## 2024-11-19 RX ADMIN — IRON SUCROSE 200 MG: 20 INJECTION, SOLUTION INTRAVENOUS at 08:13

## 2024-11-19 RX ADMIN — IBUPROFEN 600 MG: 600 TABLET, FILM COATED ORAL at 21:17

## 2024-11-19 RX ADMIN — FERROUS GLUCONATE 324 MG: 324 TABLET ORAL at 08:14

## 2024-11-19 RX ADMIN — ACETAMINOPHEN 975 MG: 325 TABLET, FILM COATED ORAL at 08:13

## 2024-11-19 RX ADMIN — ACETAMINOPHEN 975 MG: 325 TABLET, FILM COATED ORAL at 15:09

## 2024-11-19 RX ADMIN — IBUPROFEN 600 MG: 600 TABLET, FILM COATED ORAL at 15:09

## 2024-11-19 RX ADMIN — OXYCODONE HYDROCHLORIDE 5 MG: 5 TABLET ORAL at 08:14

## 2024-11-19 RX ADMIN — IBUPROFEN 600 MG: 600 TABLET, FILM COATED ORAL at 08:13

## 2024-11-19 NOTE — ASSESSMENT & PLAN NOTE
- Tylenol and motrin for pain control  - Hgb 8.0-> 7.5, gave 2U pRBC 11/18  - Admitted in the setting of pain control and to ensure patient can perform ADLs  - Attempt to move surgery earlier if symptoms worsen  - Plan for TIM, BSO with Dr. Angel 11/20

## 2024-11-19 NOTE — ASSESSMENT & PLAN NOTE
S/P Venofer and 2U pRBCs  Lab Results   Component Value Date/Time    HGB 7.5 (L) 11/18/2024 06:02 AM    HGB 8.0 (L) 11/17/2024 08:47 AM

## 2024-11-19 NOTE — PLAN OF CARE
Problem: PAIN - ADULT  Goal: Verbalizes/displays adequate comfort level or baseline comfort level  Description: Interventions:  - Encourage patient to monitor pain and request assistance  - Assess pain using appropriate pain scale  - Administer analgesics based on type and severity of pain and evaluate response  - Implement non-pharmacological measures as appropriate and evaluate response  - Consider cultural and social influences on pain and pain management  - Notify physician/advanced practitioner if interventions unsuccessful or patient reports new pain  Outcome: Progressing     Problem: INFECTION - ADULT  Goal: Absence or prevention of progression during hospitalization  Description: INTERVENTIONS:  - Assess and monitor for signs and symptoms of infection  - Monitor lab/diagnostic results  - Monitor all insertion sites, i.e. indwelling lines, tubes, and drains  - Monitor endotracheal if appropriate and nasal secretions for changes in amount and color  - [place] appropriate cooling/warming therapies per order  - Administer medications as ordered  - Instruct and encourage patient and family to use good hand hygiene technique  - Identify and instruct in appropriate isolation precautions for identified infection/condition  Outcome: Progressing     Problem: SAFETY ADULT  Goal: Patient will remain free of falls  Description: INTERVENTIONS:  - Educate patient/family on patient safety including physical limitations  - Instruct patient to call for assistance with activity   - Consult OT/PT to assist with strengthening/mobility   - Keep Call bell within reach  - Keep bed low and locked with side rails adjusted as appropriate  - Keep care items and personal belongings within reach  - Initiate and maintain comfort rounds  - Make Fall Risk Sign visible to staff  - Offer Toileting every  Hours, in advance of need  - Initiate/Maintain alarm  - Obtain necessary fall risk management equipment:   - Apply yellow socks and  bracelet for high fall risk patients  - Consider moving patient to room near nurses station  Outcome: Progressing  Goal: Maintain or return to baseline ADL function  Description: INTERVENTIONS:  -  Assess patient's ability to carry out ADLs; assess patient's baseline for ADL function and identify physical deficits which impact ability to perform ADLs (bathing, care of mouth/teeth, toileting, grooming, dressing, etc.)  - Assess/evaluate cause of self-care deficits   - Assess range of motion  - Assess patient's mobility; develop plan if impaired  - Assess patient's need for assistive devices and provide as appropriate  - Encourage maximum independence but intervene and supervise when necessary  - Involve family in performance of ADLs  - Assess for home care needs following discharge   - Consider OT consult to assist with ADL evaluation and planning for discharge  - Provide patient education as appropriate  Outcome: Progressing     Problem: DISCHARGE PLANNING  Goal: Discharge to home or other facility with appropriate resources  Description: INTERVENTIONS:  - Identify barriers to discharge w/patient and caregiver  - Arrange for needed discharge resources and transportation as appropriate  - Identify discharge learning needs (meds, wound care, etc.)  - Arrange for interpretive services to assist at discharge as needed  - Refer to Case Management Department for coordinating discharge planning if the patient needs post-hospital services based on physician/advanced practitioner order or complex needs related to functional status, cognitive ability, or social support system  Outcome: Progressing     Problem: Knowledge Deficit  Goal: Patient/family/caregiver demonstrates understanding of disease process, treatment plan, medications, and discharge instructions  Description: Complete learning assessment and assess knowledge base.  Interventions:  - Provide teaching at level of understanding  - Provide teaching via preferred  learning methods  Outcome: Progressing     Problem: GASTROINTESTINAL - ADULT  Goal: Minimal or absence of nausea and/or vomiting  Description: INTERVENTIONS:  - Administer IV fluids if ordered to ensure adequate hydration  - Maintain NPO status until nausea and vomiting are resolved  - Nasogastric tube if ordered  - Administer ordered antiemetic medications as needed  - Provide nonpharmacologic comfort measures as appropriate  - Advance diet as tolerated, if ordered  - Consider nutrition services referral to assist patient with adequate nutrition and appropriate food choices  Outcome: Progressing  Goal: Maintains or returns to baseline bowel function  Description: INTERVENTIONS:  - Assess bowel function  - Encourage oral fluids to ensure adequate hydration  - Administer IV fluids if ordered to ensure adequate hydration  - Administer ordered medications as needed  - Encourage mobilization and activity  - Consider nutritional services referral to assist patient with adequate nutrition and appropriate food choices  Outcome: Progressing  Goal: Maintains adequate nutritional intake  Description: INTERVENTIONS:  - Monitor percentage of each meal consumed  - Identify factors contributing to decreased intake, treat as appropriate  - Assist with meals as needed  - Monitor I&O, weight, and lab values if indicated  - Obtain nutrition services referral as needed  Outcome: Progressing

## 2024-11-19 NOTE — ASSESSMENT & PLAN NOTE
S/P Venofer and 2U pRBCs  Lab Results   Component Value Date/Time    HGB 9.1 (L) 11/20/2024 04:29 AM    HGB 9.3 (L) 11/19/2024 06:06 AM    HGB 7.5 (L) 11/18/2024 06:02 AM    HGB 8.0 (L) 11/17/2024 08:47 AM

## 2024-11-19 NOTE — PLAN OF CARE
Problem: PAIN - ADULT  Goal: Verbalizes/displays adequate comfort level or baseline comfort level  Description: Interventions:  - Encourage patient to monitor pain and request assistance  - Assess pain using appropriate pain scale  - Administer analgesics based on type and severity of pain and evaluate response  - Implement non-pharmacological measures as appropriate and evaluate response  - Consider cultural and social influences on pain and pain management  - Notify physician/advanced practitioner if interventions unsuccessful or patient reports new pain  Outcome: Progressing     Problem: INFECTION - ADULT  Goal: Absence or prevention of progression during hospitalization  Description: INTERVENTIONS:  - Assess and monitor for signs and symptoms of infection  - Monitor lab/diagnostic results  - Monitor all insertion sites, i.e. indwelling lines, tubes, and drains  - Monitor endotracheal if appropriate and nasal secretions for changes in amount and color  - [place] appropriate cooling/warming therapies per order  - Administer medications as ordered  - Instruct and encourage patient and family to use good hand hygiene technique  - Identify and instruct in appropriate isolation precautions for identified infection/condition  Outcome: Progressing     Problem: SAFETY ADULT  Goal: Patient will remain free of falls  Description: INTERVENTIONS:  - Educate patient/family on patient safety including physical limitations  - Instruct patient to call for assistance with activity   - Consult OT/PT to assist with strengthening/mobility   - Keep Call bell within reach  - Keep bed low and locked with side rails adjusted as appropriate  - Keep care items and personal belongings within reach  - Initiate and maintain comfort rounds  - Make Fall Risk Sign visible to staff  - Apply yellow socks and bracelet for high fall risk patients  - Consider moving patient to room near nurses station  Outcome: Progressing  Goal: Maintain or  return to baseline ADL function  Description: INTERVENTIONS:  -  Assess patient's ability to carry out ADLs; assess patient's baseline for ADL function and identify physical deficits which impact ability to perform ADLs (bathing, care of mouth/teeth, toileting, grooming, dressing, etc.)  - Assess/evaluate cause of self-care deficits   - Assess range of motion  - Assess patient's mobility; develop plan if impaired  - Assess patient's need for assistive devices and provide as appropriate  - Encourage maximum independence but intervene and supervise when necessary  - Involve family in performance of ADLs  - Assess for home care needs following discharge   - Consider OT consult to assist with ADL evaluation and planning for discharge  - Provide patient education as appropriate  Outcome: Progressing     Problem: DISCHARGE PLANNING  Goal: Discharge to home or other facility with appropriate resources  Description: INTERVENTIONS:  - Identify barriers to discharge w/patient and caregiver  - Arrange for needed discharge resources and transportation as appropriate  - Identify discharge learning needs (meds, wound care, etc.)  - Arrange for interpretive services to assist at discharge as needed  - Refer to Case Management Department for coordinating discharge planning if the patient needs post-hospital services based on physician/advanced practitioner order or complex needs related to functional status, cognitive ability, or social support system  Outcome: Progressing     Problem: Knowledge Deficit  Goal: Patient/family/caregiver demonstrates understanding of disease process, treatment plan, medications, and discharge instructions  Description: Complete learning assessment and assess knowledge base.  Interventions:  - Provide teaching at level of understanding  - Provide teaching via preferred learning methods  Outcome: Progressing     Problem: GASTROINTESTINAL - ADULT  Goal: Minimal or absence of nausea and/or  vomiting  Description: INTERVENTIONS:  - Administer IV fluids if ordered to ensure adequate hydration  - Maintain NPO status until nausea and vomiting are resolved  - Nasogastric tube if ordered  - Administer ordered antiemetic medications as needed  - Provide nonpharmacologic comfort measures as appropriate  - Advance diet as tolerated, if ordered  - Consider nutrition services referral to assist patient with adequate nutrition and appropriate food choices  Outcome: Progressing  Goal: Maintains or returns to baseline bowel function  Description: INTERVENTIONS:  - Assess bowel function  - Encourage oral fluids to ensure adequate hydration  - Administer IV fluids if ordered to ensure adequate hydration  - Administer ordered medications as needed  - Encourage mobilization and activity  - Consider nutritional services referral to assist patient with adequate nutrition and appropriate food choices  Outcome: Progressing  Goal: Maintains adequate nutritional intake  Description: INTERVENTIONS:  - Monitor percentage of each meal consumed  - Identify factors contributing to decreased intake, treat as appropriate  - Assist with meals as needed  - Monitor I&O, weight, and lab values if indicated  - Obtain nutrition services referral as needed  Outcome: Progressing

## 2024-11-19 NOTE — ASSESSMENT & PLAN NOTE
- Tylenol and motrin shruthi with fab PRN for pain  - Hgb 8.0-> 7.5 -> 2U pRBC 11/18-> 9.3  - Typed and crossed for additional 4 units pRBCs  - 400mcg vaginal cytotec ordered to be given vaginally in OR before case   - Plan for TIM, BSO with Dr. Angel 11/20 (today)

## 2024-11-19 NOTE — PROGRESS NOTES
"Progress Note - GYN Oncology   Name: Tatiana Lopez 39 y.o. female I MRN: [id number]  [facility] I Date of Admission: 11/17/2024   Date of Service: 11/19/2024 I Hospital Day: 1     Assessment & Plan  Anemia  S/P Venofer and 2U pRBCs  Lab Results   Component Value Date/Time    HGB 7.5 (L) 11/18/2024 06:02 AM    HGB 8.0 (L) 11/17/2024 08:47 AM       Learning disability  Patient completed high school  Lives with mom, dad, and older brother  Makes her own medical decisions but her dad helps her  Fibroid uterus   - Tylenol and motrin for pain control  - Hgb 8.0-> 7.5, gave 2U pRBC 11/18  - Admitted in the setting of pain control and to ensure patient can perform ADLs  - Attempt to move surgery earlier if symptoms worsen  - Plan for TIM, BSO with Dr. Angel 11/20    Subjective:    Tatiana Lopez overall reports feeling slightly better than yesterday.  She says she is still having pain particularly right-sided abdominal pain today.  She also notes that she did start her period yesterday.  She is asking if that will impact plans for surgery, we discussed that it will not.    Objective:  /87   Pulse 65   Temp 97.7 °F (36.5 °C)   Resp 16   Ht 5' 4\" (1.626 m)   Wt 86.3 kg (190 lb 4.1 oz)   LMP 10/29/2024 (Exact Date)   SpO2 96%   BMI 32.66 kg/m²     I/O last 3 completed shifts:  In: 600 [P.O.:600]  Out: -   I/O this shift:  In: 792.9 [Blood:792.9]  Out: -     Lab Results   Component Value Date    WBC 6.24 11/18/2024    HGB 7.5 (L) 11/18/2024    HCT 27.1 (L) 11/18/2024    MCV 72 (L) 11/18/2024     (H) 11/18/2024       Lab Results   Component Value Date    CALCIUM 8.6 11/18/2024    K 3.5 11/18/2024    CO2 24 11/18/2024     11/18/2024    BUN 9 11/18/2024    CREATININE 0.47 (L) 11/18/2024           Physical Exam  Constitutional:       General: She is not in acute distress.     Appearance: Normal appearance.   HENT:      Head: Normocephalic and atraumatic.   Cardiovascular:      Rate and " Rhythm: Normal rate.      Pulses: Normal pulses.   Pulmonary:      Effort: Pulmonary effort is normal. No respiratory distress.      Breath sounds: Normal breath sounds.   Abdominal:      General: Abdomen is flat.      Palpations: Abdomen is soft.   Skin:     General: Skin is warm and dry.   Neurological:      General: No focal deficit present.      Mental Status: She is alert.   Psychiatric:         Mood and Affect: Mood normal.         Behavior: Behavior normal.           Linnea Holguin MD  11/19/2024  5:59 AM

## 2024-11-19 NOTE — UTILIZATION REVIEW
SEE INITIAL REVIEW AT BOTTOM       Admission Orders (From admission, onward)       Ordered        11/18/24 1814  INPATIENT ADMISSION  Once            11/17/24 1447  Place in Observation  Once                            Date: 11/19                          Current Patient Class: Inpatient  Current Level of Care: ms    HPI:39 y.o. female  w/learning disability  initially admitted on 11/17  (OBS status)   for pain management a/w increasing abd girth in setting of known fibroid uterus (scheduled for TIM on 12/13)   chronic anemia - receiving  DAILY  IV Venofer.     11/18   CHANGED TO INPATIENT  STATUS,  MS  LEVEL OF CARE       per Gyne/Onc   , pain possibly 2/2  degeneration of the fibroids.  PLAN - surgery on Wednesday 11/20  (first available date)  give blood today due to her low blood count and anticipated blood loss.  Cont IVF @  75 ,  daily IV venofer.  [Rom better controlled today.      Date: 11/19     Day 3: Has surpassed a 2nd midnight with active treatments and services.   11/19    overall reports feeling slightly better than yesterday -  still having pain , more severe Rt side abd,  but mostly controlled w/ATC  tylenol and motrin -  Malorie po PRN.   H&H improved to 9.3 and 33.0.      Medications:   Scheduled Medications:  acetaminophen, 975 mg, Oral, Q6H SEAN  ferrous gluconate, 324 mg, Oral, Daily With Breakfast  ibuprofen, 600 mg, Oral, Q6H SEAN  iron sucrose, 200 mg, Intravenous, Daily  norgestrel-ethinyl estradiol, 1 tablet, Oral, Daily      Continuous IV Infusions:  lactated ringers, 75 mL/hr, Intravenous, Continuous      PRN Meds:  oxyCODONE, 10 mg, Oral, Q4H PRN  oxyCODONE, 5 mg, Oral, Q4H PRN  ... 11/18 x1 and 11/19 x1       Discharge Plan: tbd    Vital Signs (last 3 days)       Date/Time Temp Pulse Resp BP MAP (mmHg) SpO2 [place] Coma Scale Score Pain    11/19/24 0814 -- -- -- -- -- -- -- 10 - Worst Possible Pain    11/19/24 0813 -- -- -- -- -- -- -- 10 - Worst Possible Pain    11/19/24 07:56:55 98.5 °F  (36.9 °C) 77 18 136/83 101 96 % -- --    11/19/24 02:32:17 97.7 °F (36.5 °C) 65 -- 151/87 108 96 % -- --    11/19/24 00:42:24 98.1 °F (36.7 °C) 67 16 145/88 107 96 % -- --    11/19/24 00:05:40 98.4 °F (36.9 °C) 73 -- 143/91 108 96 % -- --    11/18/24 2345 -- -- 16 -- -- -- -- --    11/18/24 23:34:08 98.3 °F (36.8 °C) 79 -- 144/93 110 96 % -- --    11/18/24 22:56:52 98.6 °F (37 °C) 79 -- 151/97 115 95 % -- --       Weight (last 2 days)       Date/Time Weight    11/18/24 0509 86.3 (190.26)            Pertinent Labs/Diagnostic Results:   Radiology:  CT chest abdomen pelvis w contrast   Final Interpretation by Juan Boss MD (11/17 1301)      Markedly enlarged uterus with numerous leiomyomas extending into the subhepatic space. The largest pedunculated subserosal leiomyomas extending into the subhepatic space and perisplenic region demonstrate large central areas of hypoattenuation, in    keeping with necrosis/degeneration. Possibility of sarcomatous degeneration is not entirely excluded.         Resident: YEN RODGERS I, the attending radiologist, have reviewed the images and agree with the final report above.      Workstation performed: [id number]           Cardiology:  No orders to display     GI:  No orders to display           Results from last 7 days   Lab Units 11/19/24  0606 11/18/24  0602 11/17/24  0847   WBC Thousand/uL 8.64 6.24 5.91   HEMOGLOBIN g/dL 9.3* 7.5* 8.0*   HEMATOCRIT % 33.0* 27.1* 28.2*   PLATELETS Thousands/uL 490* 480* 507*   TOTAL NEUT ABS Thousands/µL  --   --  4.10         Results from last 7 days   Lab Units 11/19/24  0606 11/18/24  0602 11/17/24  0847   SODIUM mmol/L 138 139 137   POTASSIUM mmol/L 3.8 3.5 3.8   CHLORIDE mmol/L 106 107 106   CO2 mmol/L 23 24 25   ANION GAP mmol/L 9 8 6   BUN mg/dL 10 9 11   CREATININE mg/dL 0.59* 0.47* 0.47*   EGFR ml/min/1.73sq m 115 124 124   CALCIUM mg/dL 8.4 8.6 9.0   MAGNESIUM mg/dL 1.9 1.9  --    PHOSPHORUS mg/dL 3.1 3.1  --               Results from last 7 days   Lab Units 11/19/24  0606 11/18/24  0602 11/17/24  0847   GLUCOSE RANDOM mg/dL 83 82 91       Network Utilization Review Department  ATTENTION: Please call with any questions or concerns to [phone] and carefully listen to the prompts so that you are directed to the right person. All voicemails are confidential.   For Discharge needs, contact Care Management DC Support Team at [phone] opt. 2  Send all requests for admission clinical reviews, approved or denied determinations and any other requests to dedicated fax number below belonging to the campus where the patient is receiving treatment. List of dedicated fax numbers for the Facilities:  FACILITY NAME UR FAX NUMBER   ADMISSION DENIALS (Administrative/Medical Necessity) [phone]   DISCHARGE SUPPORT TEAM (NETWORK) [phone]   PARENT CHILD HEALTH (Maternity/NICU/Pediatrics) [phone]   [facility] [phone]   [facility] [phone]   [facility] [phone]   [facility] [phone]   [facility] [phone]   [facility] [phone]   [facility] [phone]   [facility] [phone]   [facility] [phone]   [facility] [phone]   [facility] [phone]   [facility] [phone]

## 2024-11-19 NOTE — UTILIZATION REVIEW
NOTIFICATION OF INPATIENT ADMISSION   AUTHORIZATION REQUEST   SERVICING FACILITY:   [facility]  Address: [address] [place] [address]  Tax ID: [id number]  NPI: [id number] ATTENDING PROVIDER:  Attending Name and NPI#: Wilfrido Swartz Md [[id number]]  Address: [address] [place] [address]  Phone: [phone]   ADMISSION INFORMATION:  Place of Service: Inpatient [facility]  Place of Service Code: 21  Inpatient Admission Date/Time: 11/18/24  6:15 PM  Discharge Date/Time: No discharge date for patient encounter.  Admitting Diagnosis Code/Description:  Abdominal pain [R10.9]  Uterine leiomyoma, unspecified location [D25.9]     UTILIZATION REVIEW CONTACT:  Reza Sanford Utilization   Network Utilization Review Department  Phone: [phone]  Fax: [phone]  Email: [email or web address]  Contact for approvals/pending authorizations, clinical reviews, and discharge.     PHYSICIAN ADVISORY SERVICES:  Medical Necessity Denial & Gizw-tf-Qrkq Review  Phone: [phone]  Fax: [phone]  Email: [email or web address]     DISCHARGE SUPPORT TEAM:  For Patients Discharge Needs & Updates  Phone: [phone] opt. 2 Fax: [phone]  Email: [email or web address]

## 2024-11-20 ENCOUNTER — ANESTHESIA (INPATIENT)
Dept: PERIOP | Facility: HOSPITAL | Age: 39
DRG: 743 | End: 2024-11-20
Payer: COMMERCIAL

## 2024-11-20 LAB
ABO GROUP BLD BPU: NORMAL
ABO GROUP BLD BPU: NORMAL
ANION GAP SERPL CALCULATED.3IONS-SCNC: 8 MMOL/L (ref 4–13)
APTT PPP: 37 SECONDS (ref 23–34)
BASE EXCESS BLDA CALC-SCNC: -5 MMOL/L (ref -2–3)
BASOPHILS # BLD AUTO: 0.07 THOUSANDS/ÂΜL (ref 0–0.1)
BASOPHILS NFR BLD AUTO: 1 % (ref 0–1)
BPU ID: NORMAL
BPU ID: NORMAL
BUN SERPL-MCNC: 9 MG/DL (ref 5–25)
CA-I BLD-SCNC: 1.14 MMOL/L (ref 1.12–1.32)
CALCIUM SERPL-MCNC: 8.3 MG/DL (ref 8.4–10.2)
CHLORIDE SERPL-SCNC: 106 MMOL/L (ref 96–108)
CO2 SERPL-SCNC: 24 MMOL/L (ref 21–32)
CREAT SERPL-MCNC: 0.5 MG/DL (ref 0.6–1.3)
EOSINOPHIL # BLD AUTO: 0.29 THOUSAND/ÂΜL (ref 0–0.61)
EOSINOPHIL NFR BLD AUTO: 3 % (ref 0–6)
ERYTHROCYTE [DISTWIDTH] IN BLOOD BY AUTOMATED COUNT: 22.2 % (ref 11.6–15.1)
GFR SERPL CREATININE-BSD FRML MDRD: 122 ML/MIN/1.73SQ M
GLUCOSE SERPL-MCNC: 104 MG/DL (ref 65–140)
GLUCOSE SERPL-MCNC: 63 MG/DL (ref 65–140)
HCO3 BLDA-SCNC: 20.3 MMOL/L (ref 22–28)
HCT VFR BLD AUTO: 31.7 % (ref 34.8–46.1)
HCT VFR BLD CALC: 30 % (ref 34.8–46.1)
HGB BLD-MCNC: 9.1 G/DL (ref 11.5–15.4)
HGB BLDA-MCNC: 10.2 G/DL (ref 11.5–15.4)
IMM GRANULOCYTES # BLD AUTO: 0.04 THOUSAND/UL (ref 0–0.2)
IMM GRANULOCYTES NFR BLD AUTO: 0 % (ref 0–2)
INR PPP: 1.21 (ref 0.85–1.19)
LYMPHOCYTES # BLD AUTO: 1.87 THOUSANDS/ÂΜL (ref 0.6–4.47)
LYMPHOCYTES NFR BLD AUTO: 19 % (ref 14–44)
MAGNESIUM SERPL-MCNC: 2.1 MG/DL (ref 1.9–2.7)
MCH RBC QN AUTO: 21.5 PG (ref 26.8–34.3)
MCHC RBC AUTO-ENTMCNC: 28.7 G/DL (ref 31.4–37.4)
MCV RBC AUTO: 75 FL (ref 82–98)
MONOCYTES # BLD AUTO: 0.89 THOUSAND/ÂΜL (ref 0.17–1.22)
MONOCYTES NFR BLD AUTO: 9 % (ref 4–12)
NEUTROPHILS # BLD AUTO: 6.93 THOUSANDS/ÂΜL (ref 1.85–7.62)
NEUTS SEG NFR BLD AUTO: 68 % (ref 43–75)
NRBC BLD AUTO-RTO: 1 /100 WBCS
PCO2 BLD: 21 MMOL/L (ref 21–32)
PCO2 BLD: 39.8 MM HG (ref 36–44)
PH BLD: 7.32 [PH] (ref 7.35–7.45)
PHOSPHATE SERPL-MCNC: 3.2 MG/DL (ref 2.7–4.5)
PLATELET # BLD AUTO: 447 THOUSANDS/UL (ref 149–390)
PMV BLD AUTO: 10 FL (ref 8.9–12.7)
PO2 BLD: 171 MM HG (ref 75–129)
POTASSIUM BLD-SCNC: 3.8 MMOL/L (ref 3.5–5.3)
POTASSIUM SERPL-SCNC: 3.8 MMOL/L (ref 3.5–5.3)
PROTHROMBIN TIME: 15.5 SECONDS (ref 12.3–15)
RBC # BLD AUTO: 4.24 MILLION/UL (ref 3.81–5.12)
SAO2 % BLD FROM PO2: 99 % (ref 60–85)
SODIUM BLD-SCNC: 138 MMOL/L (ref 136–145)
SODIUM SERPL-SCNC: 138 MMOL/L (ref 135–147)
SPECIMEN SOURCE: ABNORMAL
UNIT DISPENSE STATUS: NORMAL
UNIT DISPENSE STATUS: NORMAL
UNIT PRODUCT CODE: NORMAL
UNIT PRODUCT CODE: NORMAL
UNIT PRODUCT VOLUME: 250 ML
UNIT PRODUCT VOLUME: 250 ML
UNIT RH: NORMAL
UNIT RH: NORMAL
WBC # BLD AUTO: 10.09 THOUSAND/UL (ref 4.31–10.16)

## 2024-11-20 PROCEDURE — 84295 ASSAY OF SERUM SODIUM: CPT

## 2024-11-20 PROCEDURE — 82803 BLOOD GASES ANY COMBINATION: CPT

## 2024-11-20 PROCEDURE — 85610 PROTHROMBIN TIME: CPT | Performed by: INTERNAL MEDICINE

## 2024-11-20 PROCEDURE — 83735 ASSAY OF MAGNESIUM: CPT

## 2024-11-20 PROCEDURE — 58150 TOTAL HYSTERECTOMY: CPT | Performed by: OBSTETRICS & GYNECOLOGY

## 2024-11-20 PROCEDURE — P9016 RBC LEUKOCYTES REDUCED: HCPCS

## 2024-11-20 PROCEDURE — 82330 ASSAY OF CALCIUM: CPT

## 2024-11-20 PROCEDURE — 80048 BASIC METABOLIC PNL TOTAL CA: CPT

## 2024-11-20 PROCEDURE — 84100 ASSAY OF PHOSPHORUS: CPT

## 2024-11-20 PROCEDURE — 85730 THROMBOPLASTIN TIME PARTIAL: CPT | Performed by: INTERNAL MEDICINE

## 2024-11-20 PROCEDURE — 88307 TISSUE EXAM BY PATHOLOGIST: CPT | Performed by: PATHOLOGY

## 2024-11-20 PROCEDURE — 0UT70ZZ RESECTION OF BILATERAL FALLOPIAN TUBES, OPEN APPROACH: ICD-10-PCS | Performed by: OBSTETRICS & GYNECOLOGY

## 2024-11-20 PROCEDURE — 85025 COMPLETE CBC W/AUTO DIFF WBC: CPT

## 2024-11-20 PROCEDURE — 85014 HEMATOCRIT: CPT

## 2024-11-20 PROCEDURE — 82947 ASSAY GLUCOSE BLOOD QUANT: CPT

## 2024-11-20 PROCEDURE — 84132 ASSAY OF SERUM POTASSIUM: CPT

## 2024-11-20 PROCEDURE — 0UT90ZZ RESECTION OF UTERUS, OPEN APPROACH: ICD-10-PCS | Performed by: OBSTETRICS & GYNECOLOGY

## 2024-11-20 PROCEDURE — 99024 POSTOP FOLLOW-UP VISIT: CPT | Performed by: OBSTETRICS & GYNECOLOGY

## 2024-11-20 RX ORDER — ROPIVACAINE HYDROCHLORIDE 2 MG/ML
INJECTION, SOLUTION EPIDURAL; INFILTRATION; PERINEURAL AS NEEDED
Status: DISCONTINUED | OUTPATIENT
Start: 2024-11-20 | End: 2024-11-20

## 2024-11-20 RX ORDER — CEFAZOLIN SODIUM 2 G/50ML
2000 SOLUTION INTRAVENOUS ONCE
Status: COMPLETED | OUTPATIENT
Start: 2024-11-20 | End: 2024-11-21

## 2024-11-20 RX ORDER — ACETAMINOPHEN 325 MG/1
975 TABLET ORAL EVERY 8 HOURS SCHEDULED
Status: DISCONTINUED | OUTPATIENT
Start: 2024-11-20 | End: 2024-11-24 | Stop reason: HOSPADM

## 2024-11-20 RX ORDER — HYDROMORPHONE HCL/PF 1 MG/ML
0.5 SYRINGE (ML) INJECTION EVERY 2 HOUR PRN
Refills: 0 | Status: DISCONTINUED | OUTPATIENT
Start: 2024-11-20 | End: 2024-11-24 | Stop reason: HOSPADM

## 2024-11-20 RX ORDER — KETOROLAC TROMETHAMINE 30 MG/ML
15 INJECTION, SOLUTION INTRAMUSCULAR; INTRAVENOUS EVERY 6 HOURS SCHEDULED
Status: DISPENSED | OUTPATIENT
Start: 2024-11-20 | End: 2024-11-22

## 2024-11-20 RX ORDER — MISOPROSTOL 100 UG/1
400 TABLET ORAL ONCE
Status: COMPLETED | OUTPATIENT
Start: 2024-11-20 | End: 2024-11-20

## 2024-11-20 RX ORDER — METRONIDAZOLE 500 MG/100ML
INJECTION, SOLUTION INTRAVENOUS CONTINUOUS PRN
Status: DISCONTINUED | OUTPATIENT
Start: 2024-11-20 | End: 2024-11-20

## 2024-11-20 RX ORDER — PROPOFOL 10 MG/ML
INJECTION, EMULSION INTRAVENOUS AS NEEDED
Status: DISCONTINUED | OUTPATIENT
Start: 2024-11-20 | End: 2024-11-20

## 2024-11-20 RX ORDER — ONDANSETRON 2 MG/ML
4 INJECTION INTRAMUSCULAR; INTRAVENOUS EVERY 6 HOURS PRN
Status: DISCONTINUED | OUTPATIENT
Start: 2024-11-20 | End: 2024-11-24 | Stop reason: HOSPADM

## 2024-11-20 RX ORDER — FENTANYL CITRATE 50 UG/ML
INJECTION, SOLUTION INTRAMUSCULAR; INTRAVENOUS AS NEEDED
Status: DISCONTINUED | OUTPATIENT
Start: 2024-11-20 | End: 2024-11-20

## 2024-11-20 RX ORDER — ONDANSETRON 2 MG/ML
INJECTION INTRAMUSCULAR; INTRAVENOUS AS NEEDED
Status: DISCONTINUED | OUTPATIENT
Start: 2024-11-20 | End: 2024-11-20

## 2024-11-20 RX ORDER — EPHEDRINE SULFATE 50 MG/ML
INJECTION INTRAVENOUS AS NEEDED
Status: DISCONTINUED | OUTPATIENT
Start: 2024-11-20 | End: 2024-11-20

## 2024-11-20 RX ORDER — SODIUM CHLORIDE, SODIUM LACTATE, POTASSIUM CHLORIDE, CALCIUM CHLORIDE 600; 310; 30; 20 MG/100ML; MG/100ML; MG/100ML; MG/100ML
75 INJECTION, SOLUTION INTRAVENOUS CONTINUOUS
Status: DISCONTINUED | OUTPATIENT
Start: 2024-11-20 | End: 2024-11-21

## 2024-11-20 RX ORDER — LIDOCAINE HYDROCHLORIDE AND EPINEPHRINE 15; 5 MG/ML; UG/ML
INJECTION, SOLUTION EPIDURAL AS NEEDED
Status: DISCONTINUED | OUTPATIENT
Start: 2024-11-20 | End: 2024-11-20

## 2024-11-20 RX ORDER — SODIUM CHLORIDE, SODIUM LACTATE, POTASSIUM CHLORIDE, CALCIUM CHLORIDE 600; 310; 30; 20 MG/100ML; MG/100ML; MG/100ML; MG/100ML
INJECTION, SOLUTION INTRAVENOUS CONTINUOUS PRN
Status: DISCONTINUED | OUTPATIENT
Start: 2024-11-20 | End: 2024-11-20

## 2024-11-20 RX ORDER — MIDAZOLAM HYDROCHLORIDE 2 MG/2ML
INJECTION, SOLUTION INTRAMUSCULAR; INTRAVENOUS AS NEEDED
Status: DISCONTINUED | OUTPATIENT
Start: 2024-11-20 | End: 2024-11-20

## 2024-11-20 RX ORDER — ROCURONIUM BROMIDE 10 MG/ML
INJECTION, SOLUTION INTRAVENOUS AS NEEDED
Status: DISCONTINUED | OUTPATIENT
Start: 2024-11-20 | End: 2024-11-20

## 2024-11-20 RX ORDER — HYDROMORPHONE HCL/PF 1 MG/ML
0.5 SYRINGE (ML) INJECTION
Status: DISCONTINUED | OUTPATIENT
Start: 2024-11-20 | End: 2024-11-20 | Stop reason: HOSPADM

## 2024-11-20 RX ORDER — IBUPROFEN 600 MG/1
600 TABLET, FILM COATED ORAL EVERY 6 HOURS SCHEDULED
Status: DISCONTINUED | OUTPATIENT
Start: 2024-11-22 | End: 2024-11-24 | Stop reason: HOSPADM

## 2024-11-20 RX ORDER — LIDOCAINE HYDROCHLORIDE 10 MG/ML
INJECTION, SOLUTION EPIDURAL; INFILTRATION; INTRACAUDAL; PERINEURAL AS NEEDED
Status: DISCONTINUED | OUTPATIENT
Start: 2024-11-20 | End: 2024-11-20

## 2024-11-20 RX ORDER — METRONIDAZOLE 500 MG/100ML
500 INJECTION, SOLUTION INTRAVENOUS ONCE
Status: COMPLETED | OUTPATIENT
Start: 2024-11-20 | End: 2024-11-20

## 2024-11-20 RX ORDER — CEFAZOLIN SODIUM 2 G/50ML
SOLUTION INTRAVENOUS AS NEEDED
Status: DISCONTINUED | OUTPATIENT
Start: 2024-11-20 | End: 2024-11-20

## 2024-11-20 RX ORDER — SIMETHICONE 80 MG
80 TABLET,CHEWABLE ORAL EVERY 6 HOURS PRN
Status: DISCONTINUED | OUTPATIENT
Start: 2024-11-20 | End: 2024-11-24 | Stop reason: HOSPADM

## 2024-11-20 RX ORDER — HEPARIN SODIUM 5000 [USP'U]/ML
5000 INJECTION, SOLUTION INTRAVENOUS; SUBCUTANEOUS EVERY 8 HOURS SCHEDULED
Status: DISCONTINUED | OUTPATIENT
Start: 2024-11-21 | End: 2024-11-22

## 2024-11-20 RX ORDER — DEXAMETHASONE SODIUM PHOSPHATE 10 MG/ML
INJECTION, SOLUTION INTRAMUSCULAR; INTRAVENOUS AS NEEDED
Status: DISCONTINUED | OUTPATIENT
Start: 2024-11-20 | End: 2024-11-20

## 2024-11-20 RX ORDER — KETOROLAC TROMETHAMINE 30 MG/ML
INJECTION, SOLUTION INTRAMUSCULAR; INTRAVENOUS AS NEEDED
Status: DISCONTINUED | OUTPATIENT
Start: 2024-11-20 | End: 2024-11-20

## 2024-11-20 RX ORDER — DOCUSATE SODIUM 100 MG/1
100 CAPSULE, LIQUID FILLED ORAL 2 TIMES DAILY
Status: DISCONTINUED | OUTPATIENT
Start: 2024-11-20 | End: 2024-11-24 | Stop reason: HOSPADM

## 2024-11-20 RX ORDER — FENTANYL CITRATE/PF 50 MCG/ML
50 SYRINGE (ML) INJECTION
Status: DISCONTINUED | OUTPATIENT
Start: 2024-11-20 | End: 2024-11-20 | Stop reason: HOSPADM

## 2024-11-20 RX ORDER — SODIUM CHLORIDE 9 MG/ML
INJECTION, SOLUTION INTRAVENOUS CONTINUOUS PRN
Status: DISCONTINUED | OUTPATIENT
Start: 2024-11-20 | End: 2024-11-20

## 2024-11-20 RX ORDER — ONDANSETRON 2 MG/ML
4 INJECTION INTRAMUSCULAR; INTRAVENOUS ONCE AS NEEDED
Status: DISCONTINUED | OUTPATIENT
Start: 2024-11-20 | End: 2024-11-20 | Stop reason: HOSPADM

## 2024-11-20 RX ADMIN — METRONIDAZOLE: 500 INJECTION, SOLUTION INTRAVENOUS at 12:18

## 2024-11-20 RX ADMIN — LIDOCAINE HYDROCHLORIDE 50 MG: 10 INJECTION, SOLUTION EPIDURAL; INFILTRATION; INTRACAUDAL; PERINEURAL at 12:06

## 2024-11-20 RX ADMIN — ACETAMINOPHEN 975 MG: 325 TABLET, FILM COATED ORAL at 16:42

## 2024-11-20 RX ADMIN — CEFAZOLIN SODIUM 2000 MG: 2 SOLUTION INTRAVENOUS at 10:14

## 2024-11-20 RX ADMIN — MIDAZOLAM 2 MG: 1 INJECTION INTRAMUSCULAR; INTRAVENOUS at 11:33

## 2024-11-20 RX ADMIN — CEFAZOLIN SODIUM 2000 MG: 2 SOLUTION INTRAVENOUS at 12:14

## 2024-11-20 RX ADMIN — LIDOCAINE HYDROCHLORIDE AND EPINEPHRINE 3 ML: 15; 5 INJECTION, SOLUTION EPIDURAL at 11:59

## 2024-11-20 RX ADMIN — ACETAMINOPHEN 975 MG: 325 TABLET, FILM COATED ORAL at 21:50

## 2024-11-20 RX ADMIN — KETOROLAC TROMETHAMINE 15 MG: 30 INJECTION, SOLUTION INTRAMUSCULAR; INTRAVENOUS at 17:13

## 2024-11-20 RX ADMIN — SODIUM CHLORIDE: 0.9 INJECTION, SOLUTION INTRAVENOUS at 12:12

## 2024-11-20 RX ADMIN — ONDANSETRON 4 MG: 2 INJECTION INTRAMUSCULAR; INTRAVENOUS at 13:43

## 2024-11-20 RX ADMIN — DEXMEDETOMIDINE HYDROCHLORIDE 8 MCG: 100 INJECTION, SOLUTION INTRAVENOUS at 12:24

## 2024-11-20 RX ADMIN — ROCURONIUM BROMIDE 50 MG: 10 INJECTION, SOLUTION INTRAVENOUS at 12:06

## 2024-11-20 RX ADMIN — ROPIVACAINE HYDROCHLORIDE 5 ML: 2 INJECTION, SOLUTION EPIDURAL; INFILTRATION at 13:48

## 2024-11-20 RX ADMIN — SODIUM CHLORIDE, SODIUM LACTATE, POTASSIUM CHLORIDE, AND CALCIUM CHLORIDE 75 ML/HR: .6; .31; .03; .02 INJECTION, SOLUTION INTRAVENOUS at 16:37

## 2024-11-20 RX ADMIN — FENTANYL CITRATE 75 MCG: 50 INJECTION INTRAMUSCULAR; INTRAVENOUS at 12:26

## 2024-11-20 RX ADMIN — EPHEDRINE SULFATE 5 MG: 50 INJECTION, SOLUTION INTRAVENOUS at 12:51

## 2024-11-20 RX ADMIN — METRONIDAZOLE 500 MG: 500 INJECTION, SOLUTION INTRAVENOUS at 09:33

## 2024-11-20 RX ADMIN — ROPIVACAINE HYDROCHLORIDE: 5 INJECTION EPIDURAL; INFILTRATION; PERINEURAL at 14:08

## 2024-11-20 RX ADMIN — EPHEDRINE SULFATE 5 MG: 50 INJECTION, SOLUTION INTRAVENOUS at 12:47

## 2024-11-20 RX ADMIN — SODIUM CHLORIDE, SODIUM LACTATE, POTASSIUM CHLORIDE, AND CALCIUM CHLORIDE: .6; .31; .03; .02 INJECTION, SOLUTION INTRAVENOUS at 12:03

## 2024-11-20 RX ADMIN — DEXAMETHASONE SODIUM PHOSPHATE 10 MG: 10 INJECTION, SOLUTION INTRAMUSCULAR; INTRAVENOUS at 12:29

## 2024-11-20 RX ADMIN — DEXMEDETOMIDINE HYDROCHLORIDE 8 MCG: 100 INJECTION, SOLUTION INTRAVENOUS at 13:19

## 2024-11-20 RX ADMIN — PROPOFOL 200 MG: 10 INJECTION, EMULSION INTRAVENOUS at 12:06

## 2024-11-20 RX ADMIN — KETOROLAC TROMETHAMINE 30 MG: 30 INJECTION, SOLUTION INTRAMUSCULAR; INTRAVENOUS at 13:51

## 2024-11-20 RX ADMIN — SODIUM CHLORIDE, SODIUM LACTATE, POTASSIUM CHLORIDE, AND CALCIUM CHLORIDE 75 ML/HR: .6; .31; .03; .02 INJECTION, SOLUTION INTRAVENOUS at 16:15

## 2024-11-20 RX ADMIN — DOCUSATE SODIUM 100 MG: 100 CAPSULE, LIQUID FILLED ORAL at 17:12

## 2024-11-20 RX ADMIN — FENTANYL CITRATE 50 MCG: 50 INJECTION INTRAMUSCULAR; INTRAVENOUS at 13:51

## 2024-11-20 RX ADMIN — ROCURONIUM BROMIDE 30 MG: 10 INJECTION, SOLUTION INTRAVENOUS at 12:11

## 2024-11-20 RX ADMIN — FENTANYL CITRATE 25 MCG: 50 INJECTION INTRAMUSCULAR; INTRAVENOUS at 11:33

## 2024-11-20 RX ADMIN — PHENYLEPHRINE HYDROCHLORIDE 10 MCG/MIN: 10 INJECTION INTRAVENOUS at 12:47

## 2024-11-20 RX ADMIN — IRON SUCROSE 200 MG: 20 INJECTION, SOLUTION INTRAVENOUS at 08:17

## 2024-11-20 RX ADMIN — EPHEDRINE SULFATE 5 MG: 50 INJECTION, SOLUTION INTRAVENOUS at 12:44

## 2024-11-20 RX ADMIN — ROCURONIUM BROMIDE 20 MG: 10 INJECTION, SOLUTION INTRAVENOUS at 12:50

## 2024-11-20 RX ADMIN — OXYCODONE HYDROCHLORIDE 5 MG: 5 TABLET ORAL at 00:21

## 2024-11-20 RX ADMIN — SUGAMMADEX 200 MG: 100 INJECTION, SOLUTION INTRAVENOUS at 13:49

## 2024-11-20 NOTE — PROGRESS NOTES
"Progress Note - GYN Oncology   Name: Tatiana Lopez 39 y.o. female I MRN: [id number]  Unit/Bed#: [facility] 9[facility]01 I Date of Admission: 11/17/2024   Date of Service: 11/20/2024 I Hospital Day: 2     Assessment & Plan  Anemia  S/P Venofer and 2U pRBCs  Lab Results   Component Value Date/Time    HGB 9.1 (L) 11/20/2024 04:29 AM    HGB 9.3 (L) 11/19/2024 06:06 AM    HGB 7.5 (L) 11/18/2024 06:02 AM    HGB 8.0 (L) 11/17/2024 08:47 AM       Learning disability  Patient completed high school  Lives with mom, dad, and older brother  Makes her own medical decisions but her dad helps her  Fibroid uterus   - Tylenol and motrin shruthi with fab PRN for pain  - Hgb 8.0-> 7.5 -> 2U pRBC 11/18-> 9.3  - Typed and crossed for additional 4 units pRBCs  - 400mcg vaginal cytotec ordered to be given vaginally in OR before case   - Plan for TIM, BSO with Dr. Angel 11/20 (today)      Subjective:    Tatiana Lopez has no current complaints. She is ready for her surgery today.     Objective:  /85   Pulse 81   Temp 99.1 °F (37.3 °C)   Resp 18   Ht 5' 4\" (1.626 m)   Wt 86.3 kg (190 lb 4.1 oz)   LMP 10/29/2024 (Exact Date)   SpO2 96%   BMI 32.66 kg/m²     I/O last 3 completed shifts:  In: 2065.4 [P.O.:240; I.V.:1032.5; Blood:792.9]  Out: -   No intake/output data recorded.    Lab Results   Component Value Date    WBC 10.09 11/20/2024    HGB 9.1 (L) 11/20/2024    HCT 31.7 (L) 11/20/2024    MCV 75 (L) 11/20/2024     (H) 11/20/2024       Lab Results   Component Value Date    CALCIUM 8.4 11/19/2024    K 3.8 11/19/2024    CO2 23 11/19/2024     11/19/2024    BUN 10 11/19/2024    CREATININE 0.59 (L) 11/19/2024           Physical Exam  Constitutional:       General: She is not in acute distress.     Appearance: Normal appearance.   HENT:      Head: Normocephalic and atraumatic.   Cardiovascular:      Rate and Rhythm: Normal rate.      Pulses: Normal pulses.   Pulmonary:      Effort: Pulmonary effort is normal. No respiratory " distress.      Breath sounds: Normal breath sounds.   Abdominal:      General: Abdomen is flat.      Palpations: Abdomen is soft.      Comments: Fundus at umbilicus    Skin:     General: Skin is warm and dry.   Neurological:      General: No focal deficit present.      Mental Status: She is alert.   Psychiatric:         Mood and Affect: Mood normal.         Behavior: Behavior normal.           Linnea Holguin MD  11/20/2024  7:00 AM

## 2024-11-20 NOTE — QUICK NOTE
Tatiana Lopez  [id number]  11/20/2024  5:32 PM    POST-OP CHECK    SUBJECTIVE:  Tatiana Lopez is doing well overall. Reports continued pain consistent with newly post-op state    OBJECTIVE:  Vitals:    11/20/24 1705   BP: 134/71   Pulse: 79   Resp:    Temp:    SpO2: 95%         Physical Exam  Constitutional:       Appearance: Normal appearance.   Cardiovascular:      Rate and Rhythm: Normal rate and regular rhythm.   Pulmonary:      Effort: Pulmonary effort is normal. No respiratory distress.   Abdominal:      Palpations: Abdomen is soft.      Tenderness: There is no abdominal tenderness.      Comments: Midline incision covered with gauze and ABD  Appropriately tender to palpation   Musculoskeletal:         General: No swelling or tenderness.   Neurological:      General: No focal deficit present.      Mental Status: She is alert and oriented to person, place, and time.   Skin:     General: Skin is warm and dry.   Vitals reviewed.         ASSESSMENT:  Tatiana Lopez is s/p TIM, BSO and meeting postoperative milestones    PLAN:  Routine postop check  IV/PO pain meds and PCEA as needed  Continue regular diet  Antiemetics for nausea/vomiting prn  Follow up am labs  Adequate UOP, continue to monitor  SCDs and Heparin for DVT ppx (to start tomorrow), encourage ambulation as tolerated    Linnea Holguin MD  OBGYN Resident  11/20/2024

## 2024-11-20 NOTE — PLAN OF CARE
Problem: PAIN - ADULT  Goal: Verbalizes/displays adequate comfort level or baseline comfort level  Description: Interventions:  - Encourage patient to monitor pain and request assistance  - Assess pain using appropriate pain scale  - Administer analgesics based on type and severity of pain and evaluate response  - Implement non-pharmacological measures as appropriate and evaluate response  - Consider cultural and social influences on pain and pain management  - Notify physician/advanced practitioner if interventions unsuccessful or patient reports new pain  Outcome: Progressing     Problem: INFECTION - ADULT  Goal: Absence or prevention of progression during hospitalization  Description: INTERVENTIONS:  - Assess and monitor for signs and symptoms of infection  - Monitor lab/diagnostic results  - Monitor all insertion sites, i.e. indwelling lines, tubes, and drains  - Monitor endotracheal if appropriate and nasal secretions for changes in amount and color  - [place] appropriate cooling/warming therapies per order  - Administer medications as ordered  - Instruct and encourage patient and family to use good hand hygiene technique  - Identify and instruct in appropriate isolation precautions for identified infection/condition  Outcome: Progressing     Problem: SAFETY ADULT  Goal: Maintain or return to baseline ADL function  Description: INTERVENTIONS:  -  Assess patient's ability to carry out ADLs; assess patient's baseline for ADL function and identify physical deficits which impact ability to perform ADLs (bathing, care of mouth/teeth, toileting, grooming, dressing, etc.)  - Assess/evaluate cause of self-care deficits   - Assess range of motion  - Assess patient's mobility; develop plan if impaired  - Assess patient's need for assistive devices and provide as appropriate  - Encourage maximum independence but intervene and supervise when necessary  - Involve family in performance of ADLs  - Assess for home care  needs following discharge   - Consider OT consult to assist with ADL evaluation and planning for discharge  - Provide patient education as appropriate  Outcome: Progressing

## 2024-11-20 NOTE — PLAN OF CARE
Problem: PAIN - ADULT  Goal: Verbalizes/displays adequate comfort level or baseline comfort level  Description: Interventions:  - Encourage patient to monitor pain and request assistance  - Assess pain using appropriate pain scale  - Administer analgesics based on type and severity of pain and evaluate response  - Implement non-pharmacological measures as appropriate and evaluate response  - Consider cultural and social influences on pain and pain management  - Notify physician/advanced practitioner if interventions unsuccessful or patient reports new pain  Outcome: Progressing     Problem: INFECTION - ADULT  Goal: Absence or prevention of progression during hospitalization  Description: INTERVENTIONS:  - Assess and monitor for signs and symptoms of infection  - Monitor lab/diagnostic results  - Monitor all insertion sites, i.e. indwelling lines, tubes, and drains  - Monitor endotracheal if appropriate and nasal secretions for changes in amount and color  - [place] appropriate cooling/warming therapies per order  - Administer medications as ordered  - Instruct and encourage patient and family to use good hand hygiene technique  - Identify and instruct in appropriate isolation precautions for identified infection/condition  Outcome: Progressing     Problem: SAFETY ADULT  Goal: Patient will remain free of falls  Description: INTERVENTIONS:  - Educate patient/family on patient safety including physical limitations  - Instruct patient to call for assistance with activity   - Consult OT/PT to assist with strengthening/mobility   - Keep Call bell within reach  - Keep bed low and locked with side rails adjusted as appropriate  - Keep care items and personal belongings within reach  - Initiate and maintain comfort rounds  - Make Fall Risk Sign visible to staff  - Offer Toileting every  Hours, in advance of need  - Initiate/Maintain alarm  - Obtain necessary fall risk management equipment:  - Apply yellow socks and  bracelet for high fall risk patients  - Consider moving patient to room near nurses station  Outcome: Progressing  Goal: Maintain or return to baseline ADL function  Description: INTERVENTIONS:  -  Assess patient's ability to carry out ADLs; assess patient's baseline for ADL function and identify physical deficits which impact ability to perform ADLs (bathing, care of mouth/teeth, toileting, grooming, dressing, etc.)  - Assess/evaluate cause of self-care deficits   - Assess range of motion  - Assess patient's mobility; develop plan if impaired  - Assess patient's need for assistive devices and provide as appropriate  - Encourage maximum independence but intervene and supervise when necessary  - Involve family in performance of ADLs  - Assess for home care needs following discharge   - Consider OT consult to assist with ADL evaluation and planning for discharge  - Provide patient education as appropriate  Outcome: Progressing     Problem: DISCHARGE PLANNING  Goal: Discharge to home or other facility with appropriate resources  Description: INTERVENTIONS:  - Identify barriers to discharge w/patient and caregiver  - Arrange for needed discharge resources and transportation as appropriate  - Identify discharge learning needs (meds, wound care, etc.)  - Arrange for interpretive services to assist at discharge as needed  - Refer to Case Management Department for coordinating discharge planning if the patient needs post-hospital services based on physician/advanced practitioner order or complex needs related to functional status, cognitive ability, or social support system  Outcome: Progressing     Problem: Knowledge Deficit  Goal: Patient/family/caregiver demonstrates understanding of disease process, treatment plan, medications, and discharge instructions  Description: Complete learning assessment and assess knowledge base.  Interventions:  - Provide teaching at level of understanding  - Provide teaching via preferred  learning methods  Outcome: Progressing     Problem: GASTROINTESTINAL - ADULT  Goal: Minimal or absence of nausea and/or vomiting  Description: INTERVENTIONS:  - Administer IV fluids if ordered to ensure adequate hydration  - Maintain NPO status until nausea and vomiting are resolved  - Nasogastric tube if ordered  - Administer ordered antiemetic medications as needed  - Provide nonpharmacologic comfort measures as appropriate  - Advance diet as tolerated, if ordered  - Consider nutrition services referral to assist patient with adequate nutrition and appropriate food choices  Outcome: Progressing  Goal: Maintains or returns to baseline bowel function  Description: INTERVENTIONS:  - Assess bowel function  - Encourage oral fluids to ensure adequate hydration  - Administer IV fluids if ordered to ensure adequate hydration  - Administer ordered medications as needed  - Encourage mobilization and activity  - Consider nutritional services referral to assist patient with adequate nutrition and appropriate food choices  Outcome: Progressing  Goal: Maintains adequate nutritional intake  Description: INTERVENTIONS:  - Monitor percentage of each meal consumed  - Identify factors contributing to decreased intake, treat as appropriate  - Assist with meals as needed  - Monitor I&O, weight, and lab values if indicated  - Obtain nutrition services referral as needed  Outcome: Progressing

## 2024-11-20 NOTE — ANESTHESIA PROCEDURE NOTES
Arterial Line Insertion    Performed by: Devyn Samuels MD  Authorized by: Devyn Samuels MD  Preparation: Patient was prepped and draped in the usual sterile fashion.  Indications: hemodynamic monitoring    Location: radial artery  Procedure Details:  Mc's test normal: yes  Needle gauge: 20  Seldinger technique: Seldinger technique used  Number of attempts: 1    Post-procedure:  Post-procedure: dressing applied  Waveform: good waveform  Post-procedure CNS: normal  Patient tolerance: Patient tolerated the procedure well with no immediate complications

## 2024-11-20 NOTE — ANESTHESIA PROCEDURE NOTES
Epidural Block    Start time: 11/20/2024 11:58 AM  Reason for block: procedure for pain, at surgeon's request and post-op pain management  Staffing  Performed by: Emory Nelson MD  Authorized by: Devyn Samuels MD    Preanesthetic Checklist  Completed: patient identified, IV checked, site marked, risks and benefits discussed, surgical consent, monitors and equipment checked, pre-op evaluation and timeout performed  Epidural  Patient position: sitting  Prep: ChloraPrep  Sedation Level: light sedation  Patient monitoring: frequent blood pressure checks, continuous pulse oximetry and heart rate  Approach: midline  Location: thoracic, T8-9  Injection technique: CATARINO saline and CATARINO air  Needle  Needle type: Tuohy   Needle gauge: 18 G  Needle insertion depth: 7 cm  Catheter type: multi-orifice  Catheter size: 20 G  Catheter at skin depth: 14 cm  Catheter securement method: stabilization device, clear occlusive dressing, liquid medical adhesive and tape  Test dose: negative  Assessment  Sensory level: T10  Number of attempts: 3 or morenegative aspiration for CSF, negative aspiration for heme and no paresthesia on injection  patient tolerated the procedure well with no immediate complications

## 2024-11-20 NOTE — OP NOTE
OPERATIVE REPORT  PATIENT NAME: Tatiana Lopez    :  1985  MRN: [id number]  Pt Location: [facility] OR ROOM 06    SURGERY DATE: 2024    Surgeons and Role:     * Jewels Angel MD - Primary     * Osvaldo Esposito MD - Assisting     * Enedina Ferrell MD - Assisting    Preop Diagnosis:  Uterine leiomyoma, unspecified location [D25.9]    Post-Op Diagnosis Codes:     * Uterine leiomyoma, unspecified location [D25.9]    Procedure(s):  LAPAROTOMY EXPLORATORY  HYSTERECTOMY TOTAL ABDOMINAL (TIM) UTERUS GREATER THAN 250GMS / EXAM UNDER ANESTHESIA (EUA)  Bilateral - SALPINGECTOMY    Specimen(s):  ID Type Source Tests Collected by Time Destination   1 : and bilateral fallopian tubes Tissue Uterus TISSUE EXAM Jewels Angel MD 2024 1304        Estimated Blood Loss:   200 mL    Drains:  Urethral Catheter Non-latex 16 Fr. (Active)   Number of days: 0       Anesthesia Type:   General     Operative Indications:  Uterine leiomyoma, unspecified location [D25.9]    39 y.o. with enlarged fibroid uterus to the level of the xiphoid with AUB and bulk symptoms who presents for definitive surgical management.     Operative Findings:  Enlarged fibroid uterus to the level of the xiphoid. Notable fibroids included a [id number] fundal pedunculated fibroid and left lateral posterior [id number] pedunculated fibroid. Numerous other intramural fibroids within the uterus; uterus approximately 15cm in size.  Mildly enlarged but otherwise normal-appearing bilateral ovaries without cysts.  Normal-appearing bilateral fallopian tubes. Normal appearing omentum, bowels, appendix.   Received 1u pRBC intraoperatively.     Complications:   None    Procedure and Technique:    The patient was met in the preoperative waiting area, where consents were reviewed, and all questions were answered. She was then brought to the operating room and placed in a supine position. General endotracheal tube anesthesia was achieved without difficulty. An  appropriate time-out procedure was completed with all members of the operating room team present. She was then prepped and draped in usual sterile fashion. A Edwards catheter was inserted into the bladder using sterile technique.     A vertical midline skin incision in the xiphoid to the pubis was made with the scalpel and carried down to the fascia with bovie cautery.  The fascia was excised and extended using electrocautery.  The peritoneum was identified and entered sharply, then extended taking care to avoid the bladder.  Exploration of the abdomen and pelvis revealed the above noted findings.     A Book Shane retractor was placed and the bowels were carefully packed away with moist sponges. Attention was paid to avoid the injury to neurovascular components or surrounding bowel.     The procedure was completed bilaterally in identical fashion except where noted otherwise.  The uterus and fibroids were manually lifted. The retroperitoneum was opened along the pelvic side wall to reveal the psoas and external iliacs. The gonadal vessels were identified and elevated above and away from the ureter which was identified retroperitoneally.  The utero-ovarian ligaments were isolated, clamped, sealed using the Enseal and ligated for good hemostasis.  The anterior and posterior leafs of the broad ligament were dropped to the lower uterine segment. Anteriorly, this incision was carried out to initiate creation of a bladder flap. The bladder was dissected off to just above the level of the cervicovaginal junction.  The uterine vessels were skeletonized bilaterally.  The bilateral uterine vessels were then sealed and ligated using the Enseal with hemostasis bilaterally. Descending bites were taken bilaterally of the cardinal ligaments in a similar fashion with the Enseal. Once below the level of the external cervical os, the vagina was clamped using Zeppelin clamps and the specimen resected. At this time, the uterus, cervix  and bilateral fallopian tubes were passed off the surgical field. The angles were transfixed with #0 Vicryl in a Clark stitch and the remaining vaginal cuff were then closed with #0 Vicryl in interrupted fashion.    The pelvis was then irrigated with sterile water and examined for hemostasis, which was noted.  All sponges and instruments were removed from the abdomen and pelvis.  The bowels were then returned to their normal anatomic position. The fascia was then closed with #1 PDS in a running fashion. The subcutaneous fat was reapproximated with 2-0 Vicryl.  The skin was closed using 3-0 STRATAFIX, Exofin and sterile dressings were applied.  The patient tolerated the procedure well.       All sponge, needle and instrument counts were correct x2.  Anesthesia was reversed and the patient was taken to the PACU in a stable condition.    Dr. Angel was present for the entire procedure.    Patient Disposition:  PACU     SIGNATURE: Osvaldo Esposito MD  DATE: November 20, 2024  TIME: 2:12 PM

## 2024-11-20 NOTE — ANESTHESIA POSTPROCEDURE EVALUATION
Post-Op Assessment Note    CV Status:  Stable  Pain Score: 0    Pain management: adequate      Post-op block assessment: no complications   Mental Status:  Alert and awake   Hydration Status:  Euvolemic   PONV Controlled:  Controlled   Airway Patency:  Patent     Post Op Vitals Reviewed: Yes    No anethesia notable event occurred.    Staff: CRNA           Last Filed PACU Vitals:  Vitals Value Taken Time   Temp 97.5 °F (36.4 °C) 11/20/24 1408   Pulse 81 11/20/24 1412   /65 11/20/24 1408   Resp 22 11/20/24 1412   SpO2 96 % 11/20/24 1412   Vitals shown include unfiled device data.    Modified Xochitl:  No data recorded

## 2024-11-20 NOTE — ANESTHESIA PREPROCEDURE EVALUATION
Procedure:  LAPAROTOMY EXPLORATORY (Abdomen)  HYSTERECTOMY TOTAL ABDOMINAL (TIM) / EXAM UNDER ANESTHESIA (EUA) (Abdomen)  SALPINGECTOMY (Bilateral: Abdomen)    Relevant Problems   ANESTHESIA   (-) History of anesthesia complications      CARDIO   (-) Chest pain   (-) FREITAS (dyspnea on exertion)      HEMATOLOGY   (+) Anemia   (+) Iron deficiency anemia due to chronic blood loss   (-) Coagulation disorder (HCC)      PULMONARY   (-) Shortness of breath   (-) Sleep apnea   (-) URI (upper respiratory infection)      Obstetrics/Gynecology   (+) Fibroid uterus      Lab Results   Component Value Date    WBC 10.09 11/20/2024    HGB 9.1 (L) 11/20/2024    HCT 31.7 (L) 11/20/2024    MCV 75 (L) 11/20/2024     (H) 11/20/2024     Lab Results   Component Value Date    INR 1.21 (H) 11/20/2024    PROTIME 15.5 (H) 11/20/2024         Physical Exam    Airway    Mallampati score: II  TM Distance: >3 FB  Neck ROM: full     Dental       Cardiovascular      Pulmonary      Other Findings  post-pubertal.      Anesthesia Plan  ASA Score- 2     Anesthesia Type- general with ASA Monitors.         Additional Monitors: arterial line.    Airway Plan: ETT.           Plan Factors-Exercise tolerance (METS): >4 METS.    Chart reviewed. EKG reviewed.  Existing labs reviewed. Patient summary reviewed.                  Induction- intravenous.    Postoperative Plan-     Perioperative Resuscitation Plan - Level 1 - Full Code.       Informed Consent- Anesthetic plan and risks discussed with patient and sibling.  I personally reviewed this patient with the CRNA. Discussed and agreed on the Anesthesia Plan with the CRNA..

## 2024-11-21 LAB
ABO GROUP BLD BPU: NORMAL
ANION GAP SERPL CALCULATED.3IONS-SCNC: 8 MMOL/L (ref 4–13)
BASOPHILS # BLD AUTO: 0.02 THOUSANDS/ÂΜL (ref 0–0.1)
BASOPHILS NFR BLD AUTO: 0 % (ref 0–1)
BPU ID: NORMAL
BUN SERPL-MCNC: 11 MG/DL (ref 5–25)
CALCIUM SERPL-MCNC: 7.3 MG/DL (ref 8.4–10.2)
CHLORIDE SERPL-SCNC: 107 MMOL/L (ref 96–108)
CO2 SERPL-SCNC: 20 MMOL/L (ref 21–32)
CREAT SERPL-MCNC: 0.52 MG/DL (ref 0.6–1.3)
CROSSMATCH: NORMAL
EOSINOPHIL # BLD AUTO: 0 THOUSAND/ÂΜL (ref 0–0.61)
EOSINOPHIL NFR BLD AUTO: 0 % (ref 0–6)
ERYTHROCYTE [DISTWIDTH] IN BLOOD BY AUTOMATED COUNT: 23.3 % (ref 11.6–15.1)
GFR SERPL CREATININE-BSD FRML MDRD: 120 ML/MIN/1.73SQ M
GLUCOSE SERPL-MCNC: 99 MG/DL (ref 65–140)
HCT VFR BLD AUTO: 33.4 % (ref 34.8–46.1)
HGB BLD-MCNC: 9.6 G/DL (ref 11.5–15.4)
IMM GRANULOCYTES # BLD AUTO: 0.08 THOUSAND/UL (ref 0–0.2)
IMM GRANULOCYTES NFR BLD AUTO: 1 % (ref 0–2)
LYMPHOCYTES # BLD AUTO: 0.82 THOUSANDS/ÂΜL (ref 0.6–4.47)
LYMPHOCYTES NFR BLD AUTO: 7 % (ref 14–44)
MAGNESIUM SERPL-MCNC: 2.1 MG/DL (ref 1.9–2.7)
MCH RBC QN AUTO: 21.9 PG (ref 26.8–34.3)
MCHC RBC AUTO-ENTMCNC: 28.7 G/DL (ref 31.4–37.4)
MCV RBC AUTO: 76 FL (ref 82–98)
MONOCYTES # BLD AUTO: 1.18 THOUSAND/ÂΜL (ref 0.17–1.22)
MONOCYTES NFR BLD AUTO: 9 % (ref 4–12)
NEUTROPHILS # BLD AUTO: 10.43 THOUSANDS/ÂΜL (ref 1.85–7.62)
NEUTS SEG NFR BLD AUTO: 83 % (ref 43–75)
NRBC BLD AUTO-RTO: 0 /100 WBCS
PHOSPHATE SERPL-MCNC: 2.9 MG/DL (ref 2.7–4.5)
PLATELET # BLD AUTO: 442 THOUSANDS/UL (ref 149–390)
PMV BLD AUTO: 10.6 FL (ref 8.9–12.7)
POTASSIUM SERPL-SCNC: 4.8 MMOL/L (ref 3.5–5.3)
RBC # BLD AUTO: 4.39 MILLION/UL (ref 3.81–5.12)
SODIUM SERPL-SCNC: 135 MMOL/L (ref 135–147)
UNIT DISPENSE STATUS: NORMAL
UNIT PRODUCT CODE: NORMAL
UNIT PRODUCT VOLUME: 350 ML
UNIT RH: NORMAL
WBC # BLD AUTO: 12.53 THOUSAND/UL (ref 4.31–10.16)

## 2024-11-21 PROCEDURE — 99222 1ST HOSP IP/OBS MODERATE 55: CPT | Performed by: STUDENT IN AN ORGANIZED HEALTH CARE EDUCATION/TRAINING PROGRAM

## 2024-11-21 PROCEDURE — 97166 OT EVAL MOD COMPLEX 45 MIN: CPT

## 2024-11-21 PROCEDURE — 80048 BASIC METABOLIC PNL TOTAL CA: CPT | Performed by: STUDENT IN AN ORGANIZED HEALTH CARE EDUCATION/TRAINING PROGRAM

## 2024-11-21 PROCEDURE — 99024 POSTOP FOLLOW-UP VISIT: CPT | Performed by: OBSTETRICS & GYNECOLOGY

## 2024-11-21 PROCEDURE — 83735 ASSAY OF MAGNESIUM: CPT | Performed by: STUDENT IN AN ORGANIZED HEALTH CARE EDUCATION/TRAINING PROGRAM

## 2024-11-21 PROCEDURE — 85025 COMPLETE CBC W/AUTO DIFF WBC: CPT | Performed by: STUDENT IN AN ORGANIZED HEALTH CARE EDUCATION/TRAINING PROGRAM

## 2024-11-21 PROCEDURE — 97162 PT EVAL MOD COMPLEX 30 MIN: CPT

## 2024-11-21 PROCEDURE — 84100 ASSAY OF PHOSPHORUS: CPT | Performed by: STUDENT IN AN ORGANIZED HEALTH CARE EDUCATION/TRAINING PROGRAM

## 2024-11-21 RX ADMIN — FERROUS GLUCONATE 324 MG: 324 TABLET ORAL at 07:27

## 2024-11-21 RX ADMIN — ACETAMINOPHEN 975 MG: 325 TABLET, FILM COATED ORAL at 22:32

## 2024-11-21 RX ADMIN — ACETAMINOPHEN 975 MG: 325 TABLET, FILM COATED ORAL at 05:01

## 2024-11-21 RX ADMIN — KETOROLAC TROMETHAMINE 15 MG: 30 INJECTION, SOLUTION INTRAMUSCULAR; INTRAVENOUS at 05:01

## 2024-11-21 RX ADMIN — HEPARIN SODIUM 5000 UNITS: 5000 INJECTION, SOLUTION INTRAVENOUS; SUBCUTANEOUS at 22:33

## 2024-11-21 RX ADMIN — ACETAMINOPHEN 975 MG: 325 TABLET, FILM COATED ORAL at 14:22

## 2024-11-21 RX ADMIN — DOCUSATE SODIUM 100 MG: 100 CAPSULE, LIQUID FILLED ORAL at 07:27

## 2024-11-21 RX ADMIN — SODIUM CHLORIDE, SODIUM LACTATE, POTASSIUM CHLORIDE, AND CALCIUM CHLORIDE 75 ML/HR: .6; .31; .03; .02 INJECTION, SOLUTION INTRAVENOUS at 05:08

## 2024-11-21 RX ADMIN — KETOROLAC TROMETHAMINE 15 MG: 30 INJECTION, SOLUTION INTRAMUSCULAR; INTRAVENOUS at 23:45

## 2024-11-21 RX ADMIN — HEPARIN SODIUM 5000 UNITS: 5000 INJECTION, SOLUTION INTRAVENOUS; SUBCUTANEOUS at 14:22

## 2024-11-21 RX ADMIN — KETOROLAC TROMETHAMINE 15 MG: 30 INJECTION, SOLUTION INTRAMUSCULAR; INTRAVENOUS at 11:43

## 2024-11-21 RX ADMIN — DOCUSATE SODIUM 100 MG: 100 CAPSULE, LIQUID FILLED ORAL at 17:29

## 2024-11-21 RX ADMIN — KETOROLAC TROMETHAMINE 15 MG: 30 INJECTION, SOLUTION INTRAMUSCULAR; INTRAVENOUS at 17:29

## 2024-11-21 RX ADMIN — ROPIVACAINE HYDROCHLORIDE: 5 INJECTION EPIDURAL; INFILTRATION; PERINEURAL at 17:35

## 2024-11-21 NOTE — ASSESSMENT & PLAN NOTE
S/P TIM, BSO 11/20  - Hgb 8.0-> 7.5 -> 2U pRBC 11/18-> 9.3-> 1 unit intra-op-> 9.6 on POD1  Pain: Tylenol/toradol hsruthi, PCEA  : horner  VTE ppx: SQH TID to start POD1, SCDs

## 2024-11-21 NOTE — ASSESSMENT & PLAN NOTE
S/p Ex Lap [facility] 11/20  - Epidural place preoperatively, working well   - 8/15/10/3  - Anticipate removal tomorrow  - IV Dilaudid 0.5mg q2hrs PRN breakthrough pain    - Tylenol, Toradol as ordered   - Ibuprofen timed for completion of Toradol  - PO narcotics once epidural removed  - IS OOB PT as able

## 2024-11-21 NOTE — OCCUPATIONAL THERAPY NOTE
Occupational Therapy Evaluation     Patient Name: Tatiana Lopez  Today's Date: 11/21/2024  Problem List  Principal Problem:    Fibroid uterus  Active Problems:    Anemia    Learning disability    Past Medical History  History reviewed. No pertinent past medical history.  Past Surgical History  Past Surgical History:   Procedure Laterality Date    ANKLE FRACTURE SURGERY Left     per patient    HYSTERECTOMY N/A 11/20/2024    Procedure: HYSTERECTOMY TOTAL ABDOMINAL (TIM) UTERUS GREATER THAN 250GMS / EXAM UNDER ANESTHESIA (EUA);  Surgeon: Jewels Angel MD;  [facility];  Service: Gynecology Oncology    LAPAROTOMY N/A 11/20/2024    Procedure: LAPAROTOMY EXPLORATORY;  Surgeon: Jewels Angel MD;  [facility];  Service: Gynecology Oncology    REDUCTION MAMMAPLASTY Bilateral     SALPINGECTOMY Bilateral 11/20/2024    Procedure: SALPINGECTOMY;  Surgeon: Jewels Angel MD;  [facility];  Service: Gynecology Oncology    SPINE SURGERY N/A     per patient    TONSILECTOMY AND ADNOIDECTOMY Bilateral         11/21/24 0911   OT Last Visit   OT Visit Date 11/21/24   Note Type   Note type Evaluation   Pain Assessment   Pain Assessment Tool 0-10   Pain Score 3   Pain Location/Orientation Location: Abdomen   Effect of Pain on Daily Activities limits comfort   Patient's Stated Pain Goal No pain   Hospital Pain Intervention(s) Repositioned;Ambulation/increased activity;Emotional support   Restrictions/Precautions   Weight Bearing Precautions Per Order No   Other Precautions Multiple lines;Fall Risk;Pain  (epidural, horner catheter)   Home Living   Type of Home House   Home Layout Two level  (1 TYE; bedroom upstairs and bathroom downstairs; plans to stay on the first floor upon return home)   Bathroom Shower/Tub Walk-in shower   Bathroom Toilet Standard   Bathroom Equipment Commode   Home Equipment   (pt denies)   Prior Function   Level of [place] Independent with ADLs;Independent with functional  mobility;Needs assistance with IADLS   Lives With Family   Receives Help From Family   IADLs Independent with medication management;Family/Friend/Other provides transportation;Family/Friend/Other provides meals   Falls in the last 6 months 0   Lifestyle   Autonomy Pt reports I w/ ADLs and fxnl mobility w/o AD and requries A w/ IADLs at baseline; - driving.   Reciprocal Relationships Pt lives w/ her parents and brother. Her brother is home t/o the day to assist w/ any needs upon d/c.   Intrinsic Gratification Pt enjoys crocheting.   General   Family/Caregiver Present No   ADL   Where Assessed Edge of bed   Eating Assistance 7  Independent   Grooming Assistance 7  Independent   UB Bathing Assistance 6  Modified Independent   LB Bathing Assistance 5  Supervision/Setup   UB Dressing Assistance 6  Modified independent   LB Dressing Assistance 5  Supervision/Setup   Toileting Assistance  5  Supervision/Setup   Bed Mobility   Supine to Sit 6  Modified independent   Additional items HOB elevated;Bedrails   Transfers   Sit to Stand 6  Modified independent   Stand to Sit 6  Modified independent   Additional Comments completed w/o AD   Functional Mobility   Functional Mobility 5  Supervision   Additional Comments Pt ambulated long household distance w/ S and w/o AD.   Additional items   (no AD)   Balance   Static Sitting Fair +   Dynamic Sitting Fair +   Static Standing Fair   Dynamic Standing Fair   Ambulatory Fair   Activity Tolerance   Activity Tolerance Patient tolerated treatment well   Medical Staff Made Aware PTRukhsana, due to pt's medical complexity and multiple comorbidities   Nurse Made Aware RN clearance prior to session   RUE Assessment   RUE Assessment WFL   LUE Assessment   LUE Assessment WFL   Hand Function   Gross Motor Coordination Functional   Fine Motor Coordination Functional   Cognition   Overall Cognitive Status WFL   Arousal/Participation Alert;Responsive;Cooperative   Attention Within functional limits    Orientation Level Oriented X4   Memory Within functional limits   Following Commands Follows all commands and directions without difficulty   Comments Pt identified by name and . Pt was pleasant, cooperative, and willing to participate in OT evaluation.   Assessment   Assessment Pt is a 39 y.o. female seen for OT evaluation s/p admission to [facility] on 2024. Pt diagnosed with Fibroid uterus; s/p ex lap, TIM, and BSO on . Pt has a significant PMH impacting occupational performance including: learning disability and anemia. Pt with active OT evaluation and treatment orders and activity orders. PTA, pt living with her parents and brother in a 2 SH w/ 1 TYE. Pt reports I w/ ADLs and fxnl mobility w/o AD and requires A w/ IADLs at baseline; - driving. Pt agreeable and willing to participate in OT evaluation. During evaluation, pt was I for eating and grooming, mod I for UB ADLs, and S for LB ADLs and toileting. Pt was also mod I for bed mobility and transfers and S for fxnl mobility w/o AD. Pt performing ADLs at/near baseline level of independence and reports having good social support upon return home. No further acute OT needs identified at this time. Recommend continued active ADL participation and mobilization with hospital staff while in the hospital to increase pt’s endurance and strength upon D/C. From OT standpoint, recommend D/C to home with family support when medically cleared. D/C pt from OT caseload at this time.   Goals   Patient Goals to go for a walk   Plan   OT Frequency Eval only   Discharge Recommendation   Rehab Resource Intensity Level, OT No post-acute rehabilitation needs   AM-PAC Daily Activity Inpatient   Lower Body Dressing 3   Bathing 3   Toileting 3   Upper Body Dressing 4   Grooming 4   Eating 4   Daily Activity Raw Score 21   Daily Activity Standardized Score (Calc for Raw Score >=11) 44.27   AM-PAC Applied Cognition Inpatient   Following a Speech/Presentation 4    Understanding Ordinary Conversation 4   Taking Medications 4   Remembering Where Things Are Placed or Put Away 4   Remembering List of 4-5 Errands 4   Taking Care of Complicated Tasks 3   Applied Cognition Raw Score 23   Applied Cognition Standardized Score 53.08       The patient's raw score on the -PAC Daily Activity Inpatient Short Form is 21. A raw score of greater than or equal to 19 suggests the patient may benefit from discharge to home. Please refer to the recommendation of the Occupational Therapist for safe discharge planning.    YOLA Urrutia, OTR/L

## 2024-11-21 NOTE — PLAN OF CARE
Problem: PAIN - ADULT  Goal: Verbalizes/displays adequate comfort level or baseline comfort level  Description: Interventions:  - Encourage patient to monitor pain and request assistance  - Assess pain using appropriate pain scale  - Administer analgesics based on type and severity of pain and evaluate response  - Implement non-pharmacological measures as appropriate and evaluate response  - Consider cultural and social influences on pain and pain management  - Notify physician/advanced practitioner if interventions unsuccessful or patient reports new pain  Outcome: Progressing     Problem: INFECTION - ADULT  Goal: Absence or prevention of progression during hospitalization  Description: INTERVENTIONS:  - Assess and monitor for signs and symptoms of infection  - Monitor lab/diagnostic results  - Monitor all insertion sites, i.e. indwelling lines, tubes, and drains  - Monitor endotracheal if appropriate and nasal secretions for changes in amount and color  - [place] appropriate cooling/warming therapies per order  - Administer medications as ordered  - Instruct and encourage patient and family to use good hand hygiene technique  - Identify and instruct in appropriate isolation precautions for identified infection/condition  Outcome: Progressing     Problem: SAFETY ADULT  Goal: Patient will remain free of falls  Description: INTERVENTIONS:  - Educate patient/family on patient safety including physical limitations  - Instruct patient to call for assistance with activity   - Consult OT/PT to assist with strengthening/mobility   - Keep Call bell within reach  - Keep bed low and locked with side rails adjusted as appropriate  - Keep care items and personal belongings within reach  - Initiate and maintain comfort rounds  - Make Fall Risk Sign visible to staff  - Offer Toileting every  Hours, in advance of need  - Initiate/Maintain larm  - Obtain necessary fall risk management equipment: - Apply yellow socks and  bracelet for high fall risk patients  - Consider moving patient to room near nurses station  Outcome: Progressing  Goal: Maintain or return to baseline ADL function  Description: INTERVENTIONS:  -  Assess patient's ability to carry out ADLs; assess patient's baseline for ADL function and identify physical deficits which impact ability to perform ADLs (bathing, care of mouth/teeth, toileting, grooming, dressing, etc.)  - Assess/evaluate cause of self-care deficits   - Assess range of motion  - Assess patient's mobility; develop plan if impaired  - Assess patient's need for assistive devices and provide as appropriate  - Encourage maximum independence but intervene and supervise when necessary  - Involve family in performance of ADLs  - Assess for home care needs following discharge   - Consider OT consult to assist with ADL evaluation and planning for discharge  - Provide patient education as appropriate  Outcome: Progressing

## 2024-11-21 NOTE — PROGRESS NOTES
"Progress Note - GYN Oncology   Name: Tatiana Lopez 39 y.o. female I MRN: [id number]  Unit/Bed#: [facility] 934-01 I Date of Admission: 11/17/2024   Date of Service: 11/21/2024 I Hospital Day: 3     Assessment & Plan  Anemia  S/P Venofer and 2U pRBCs  Lab Results   Component Value Date/Time    HGB 9.6 (L) 11/21/2024 04:39 AM    HGB 10.2 (L) 11/20/2024 01:19 PM    HGB 9.1 (L) 11/20/2024 04:29 AM    HGB 9.3 (L) 11/19/2024 06:06 AM       Learning disability  Patient completed high school  Lives with mom, dad, and older brother  Makes her own medical decisions but her dad helps her  Fibroid uterus  S/P TIM, BSO 11/20  - Hgb 8.0-> 7.5 -> 2U pRBC 11/18-> 9.3-> 1 unit intra-op-> 9.6 on POD1  Pain: Tylenol/toradol shruthi, PCEA  : horner  VTE ppx: SQH TID to start POD1, SCDs    Subjective:    Tatiana Lopez has no current complaints.  Pain is well controlled with PCEA.  Overnight events: none. Patient is currently voiding via horner.  She is not ambulating.  Patient is not currently passing flatus and has had no bowel movement. She is tolerating PO, and denies nausea or vomiting. Patient denies fever, chills, chest pain, shortness of breath, or calf tenderness.     Objective:  /50   Pulse 74   Temp (!) 97.4 °F (36.3 °C) (Oral)   Resp 19   Ht 5' 4\" (1.626 m)   Wt 86.3 kg (190 lb 4.1 oz)   LMP 10/29/2024 (Exact Date)   SpO2 91%   BMI 32.66 kg/m²     I/O last 3 completed shifts:  In: 2482.5 [I.V.:2032.5; Blood:350; IV Piggyback:100]  Out: 350 [Urine:200; Blood:150]  I/O this shift:  In: 1038.9 [I.V.:1038.9]  Out: 625 [Urine:625]    Lab Results   Component Value Date    WBC 12.53 (H) 11/21/2024    HGB 9.6 (L) 11/21/2024    HCT 33.4 (L) 11/21/2024    MCV 76 (L) 11/21/2024     (H) 11/21/2024       Lab Results   Component Value Date    GLUCOSE 104 11/20/2024    CALCIUM 7.3 (L) 11/21/2024    K 4.8 11/21/2024    CO2 20 (L) 11/21/2024     11/21/2024    BUN 11 11/21/2024    CREATININE 0.52 (L) 11/21/2024 "           Physical Exam  Constitutional:       General: She is not in acute distress.     Appearance: Normal appearance.   HENT:      Head: Normocephalic and atraumatic.   Cardiovascular:      Rate and Rhythm: Normal rate.      Pulses: Normal pulses.   Pulmonary:      Effort: Pulmonary effort is normal. No respiratory distress.      Breath sounds: Normal breath sounds.   Abdominal:      General: Abdomen is flat.      Palpations: Abdomen is soft.      Comments: Incision covered with ABD no bleeding present on bandages  Appropriately tender to palpation   Genitourinary:     Comments: Edwards catheter in place  Skin:     General: Skin is warm and dry.   Neurological:      General: No focal deficit present.      Mental Status: She is alert.   Psychiatric:         Mood and Affect: Mood normal.         Behavior: Behavior normal.           Linnea Holguin MD  11/21/2024  6:26 AM

## 2024-11-21 NOTE — PHYSICAL THERAPY NOTE
Physical Therapy Evaluation     Patient's Name: Tatiana Lopez    Admitting Diagnosis  Abdominal pain [R10.9]  Uterine leiomyoma, unspecified location [D25.9]    Problem List  Patient Active Problem List   Diagnosis    Anemia    Learning disability    Overweight    Family history of diabetes mellitus    Iron deficiency anemia due to chronic blood loss    Fibroid uterus       Past Medical History  History reviewed. No pertinent past medical history.    Past Surgical History  Past Surgical History:   Procedure Laterality Date    ANKLE FRACTURE SURGERY Left     per patient    HYSTERECTOMY N/A 11/20/2024    Procedure: HYSTERECTOMY TOTAL ABDOMINAL (TIM) UTERUS GREATER THAN 250GMS / EXAM UNDER ANESTHESIA (EUA);  Surgeon: Jewels Angel MD;  [facility];  Service: Gynecology Oncology    LAPAROTOMY N/A 11/20/2024    Procedure: LAPAROTOMY EXPLORATORY;  Surgeon: Jewels Angel MD;  [facility];  Service: Gynecology Oncology    REDUCTION MAMMAPLASTY Bilateral     SALPINGECTOMY Bilateral 11/20/2024    Procedure: SALPINGECTOMY;  Surgeon: Jewels Angel MD;  [facility];  Service: Gynecology Oncology    SPINE SURGERY N/A     per patient    TONSILECTOMY AND ADNOIDECTOMY Bilateral         11/21/24 0910   PT Last Visit   PT Visit Date 11/21/24   Note Type   Note type Evaluation   Pain Assessment   Pain Assessment Tool 0-10   Pain Score 3   Pain Location/Orientation Location: Abdomen   Hospital Pain Intervention(s) Repositioned;Ambulation/increased activity;Emotional support   Restrictions/Precautions   Weight Bearing Precautions Per Order No   Other Precautions Cognitive;Chair Alarm;Bed Alarm;Multiple lines;Fall Risk;Pain  (epidural, horner catheter, baseline learning disability)   Home Living   Type of Home House   Home Layout Two level  (1 TYE; bedroom upstairs and bathroom downstairs. Plans to stay on first floor upon d/c home)   Bathroom Shower/Tub Walk-in shower   Bathroom Toilet Standard   Bathroom  Equipment Commode   Home Equipment   (denies)   Prior Function   Level of [place] Independent with ADLs;Independent with functional mobility;Needs assistance with IADLS   Lives With Family   Receives Help From Family   IADLs Family/Friend/Other provides transportation   Falls in the last 6 months 0   General   Family/Caregiver Present No   Cognition   Overall Cognitive Status WFL   Arousal/Participation Cooperative   Attention Within functional limits   Orientation Level Oriented X4   Memory Within functional limits   Following Commands Follows all commands and directions without difficulty   Comments very pleasant and cooperative   RLE Assessment   RLE Assessment WFL   LLE Assessment   LLE Assessment WFL   Bed Mobility   Supine to Sit 6  Modified independent   Additional items HOB elevated;Bedrails   Sit to Supine Unable to assess   Additional Comments pt supine in bed upon arrival. pt left sitting OOB in recliner with all needs wihtin reach - alarm on post session   Transfers   Sit to Stand 6  Modified independent   Stand to Sit 6  Modified independent   Additional Comments no AD   Ambulation/Elevation   Gait pattern Short stride;Foward flexed   Gait Assistance 5  Supervision   Assistive Device None   Distance 2 x 80'   Stair Management Assistance 5  Supervision   Stair Management Technique One rail R;Step to pattern;Foreward   Number of Stairs 2   Balance   Static Sitting Fair +   Dynamic Sitting Fair +   Static Standing Fair   Dynamic Standing Fair   Ambulatory Fair   Activity Tolerance   Activity Tolerance Patient tolerated treatment well   Medical Staff Made Aware RK bourne; co-session completed this date 2* increased medical complexity and multiple co-morbidities   Nurse Made Aware RN cleared   Assessment   Prognosis Good   Assessment Pt seen for moderate complexity PT evaluation. Pt with active PT eval/treat and up as tolerated orders. Pt is a 39 y.o. female who was admitted to [facility] on  11/17 with fibroid uterus s/p ex lap, TIM. Pt's active problems include: anemia, learning disability.Pt  has no past medical history on file. Pt resides with family in 2  with 1 TYE and was independent prior to hospital admission. Currently upon evaluation pt performing bed mobility tasks at mod I level, funational transfers at mod I and ambulation at S level. Pt was left sitting OOB in recliner at the end of PT session with all needs within reach. Pt with no questions or concerns regarding d/c home; appears to be functioning at/ near baseline mobility levels. Pt with no further acute inpatient PT needs at this time- please re-consult if needed. PT to discharge pt and recommends home with family support as needed.  Encourage pt to ambulate at least 3-4x/day with restorative/ nursing staff while remaining in hospital. The patient's AM-PAC Basic Mobility Inpatient Short Form Raw Score is 22. A Raw score of greater than 16 suggests the patient may benefit from discharge to home. Please also refer to the recommendation of the Physical Therapist for safe discharge planning.   Goals   Patient Goals to go for a walk   Plan   PT Frequency   (eval only- d/c PT)   Discharge Recommendation   Rehab Resource Intensity Level, PT No post-acute rehabilitation needs  (home with increased social support as needed)   AM-PAC Basic Mobility Inpatient   Turning in Flat Bed Without Bedrails 4   Lying on Back to Sitting on Edge of Flat Bed Without Bedrails 4   Moving Bed to Chair 4   Standing Up From Chair Using Arms 4   Walk in Room 3   Climb 3-5 Stairs With Railing 3   Basic Mobility Inpatient Raw Score 22   Basic Mobility Standardized Score 47.4   [facility] Highest Level Of Mobility   -HLM Goal 7: Walk 25 feet or more   -HLM Achieved 7: Walk 25 feet or more   Modified [place] Scale   Modified [place] Scale 3           Rukhsana Gonzalez, PT DPT

## 2024-11-21 NOTE — ASSESSMENT & PLAN NOTE
S/P Venofer and 2U pRBCs  Lab Results   Component Value Date/Time    HGB 9.6 (L) 11/21/2024 04:39 AM    HGB 10.2 (L) 11/20/2024 01:19 PM    HGB 9.1 (L) 11/20/2024 04:29 AM    HGB 9.3 (L) 11/19/2024 06:06 AM

## 2024-11-22 LAB
ANION GAP SERPL CALCULATED.3IONS-SCNC: 5 MMOL/L (ref 4–13)
BASOPHILS # BLD AUTO: 0.02 THOUSANDS/ÂΜL (ref 0–0.1)
BASOPHILS NFR BLD AUTO: 0 % (ref 0–1)
BUN SERPL-MCNC: 12 MG/DL (ref 5–25)
CALCIUM SERPL-MCNC: 7.4 MG/DL (ref 8.4–10.2)
CHLORIDE SERPL-SCNC: 107 MMOL/L (ref 96–108)
CO2 SERPL-SCNC: 25 MMOL/L (ref 21–32)
CREAT SERPL-MCNC: 0.46 MG/DL (ref 0.6–1.3)
EOSINOPHIL # BLD AUTO: 0.03 THOUSAND/ÂΜL (ref 0–0.61)
EOSINOPHIL NFR BLD AUTO: 0 % (ref 0–6)
ERYTHROCYTE [DISTWIDTH] IN BLOOD BY AUTOMATED COUNT: 24.3 % (ref 11.6–15.1)
GFR SERPL CREATININE-BSD FRML MDRD: 125 ML/MIN/1.73SQ M
GLUCOSE SERPL-MCNC: 81 MG/DL (ref 65–140)
HCT VFR BLD AUTO: 33.1 % (ref 34.8–46.1)
HGB BLD-MCNC: 9.4 G/DL (ref 11.5–15.4)
IMM GRANULOCYTES # BLD AUTO: 0.02 THOUSAND/UL (ref 0–0.2)
IMM GRANULOCYTES NFR BLD AUTO: 0 % (ref 0–2)
LYMPHOCYTES # BLD AUTO: 1.65 THOUSANDS/ÂΜL (ref 0.6–4.47)
LYMPHOCYTES NFR BLD AUTO: 21 % (ref 14–44)
MAGNESIUM SERPL-MCNC: 1.9 MG/DL (ref 1.9–2.7)
MCH RBC QN AUTO: 22.3 PG (ref 26.8–34.3)
MCHC RBC AUTO-ENTMCNC: 28.4 G/DL (ref 31.4–37.4)
MCV RBC AUTO: 79 FL (ref 82–98)
MONOCYTES # BLD AUTO: 0.56 THOUSAND/ÂΜL (ref 0.17–1.22)
MONOCYTES NFR BLD AUTO: 7 % (ref 4–12)
NEUTROPHILS # BLD AUTO: 5.69 THOUSANDS/ÂΜL (ref 1.85–7.62)
NEUTS SEG NFR BLD AUTO: 72 % (ref 43–75)
NRBC BLD AUTO-RTO: 0 /100 WBCS
PHOSPHATE SERPL-MCNC: 1.8 MG/DL (ref 2.7–4.5)
PLATELET # BLD AUTO: 331 THOUSANDS/UL (ref 149–390)
PMV BLD AUTO: 10 FL (ref 8.9–12.7)
POTASSIUM SERPL-SCNC: 4.1 MMOL/L (ref 3.5–5.3)
RBC # BLD AUTO: 4.21 MILLION/UL (ref 3.81–5.12)
SODIUM SERPL-SCNC: 137 MMOL/L (ref 135–147)
WBC # BLD AUTO: 7.97 THOUSAND/UL (ref 4.31–10.16)

## 2024-11-22 PROCEDURE — 99232 SBSQ HOSP IP/OBS MODERATE 35: CPT | Performed by: ANESTHESIOLOGY

## 2024-11-22 PROCEDURE — 80048 BASIC METABOLIC PNL TOTAL CA: CPT

## 2024-11-22 PROCEDURE — 99024 POSTOP FOLLOW-UP VISIT: CPT | Performed by: OBSTETRICS & GYNECOLOGY

## 2024-11-22 PROCEDURE — 84100 ASSAY OF PHOSPHORUS: CPT

## 2024-11-22 PROCEDURE — 85025 COMPLETE CBC W/AUTO DIFF WBC: CPT

## 2024-11-22 PROCEDURE — 83735 ASSAY OF MAGNESIUM: CPT

## 2024-11-22 RX ORDER — OXYCODONE HYDROCHLORIDE 5 MG/1
5 TABLET ORAL EVERY 4 HOURS PRN
Refills: 0 | Status: DISCONTINUED | OUTPATIENT
Start: 2024-11-22 | End: 2024-11-24 | Stop reason: HOSPADM

## 2024-11-22 RX ORDER — OXYCODONE HYDROCHLORIDE 10 MG/1
10 TABLET ORAL EVERY 4 HOURS PRN
Refills: 0 | Status: DISCONTINUED | OUTPATIENT
Start: 2024-11-22 | End: 2024-11-24 | Stop reason: HOSPADM

## 2024-11-22 RX ORDER — HEPARIN SODIUM 5000 [USP'U]/ML
5000 INJECTION, SOLUTION INTRAVENOUS; SUBCUTANEOUS EVERY 8 HOURS SCHEDULED
Status: DISCONTINUED | OUTPATIENT
Start: 2024-11-22 | End: 2024-11-24 | Stop reason: HOSPADM

## 2024-11-22 RX ADMIN — OXYCODONE HYDROCHLORIDE 5 MG: 5 TABLET ORAL at 10:40

## 2024-11-22 RX ADMIN — FERROUS GLUCONATE 324 MG: 324 TABLET ORAL at 08:32

## 2024-11-22 RX ADMIN — OXYCODONE HYDROCHLORIDE 5 MG: 5 TABLET ORAL at 21:31

## 2024-11-22 RX ADMIN — ACETAMINOPHEN 975 MG: 325 TABLET, FILM COATED ORAL at 21:31

## 2024-11-22 RX ADMIN — IBUPROFEN 600 MG: 600 TABLET, FILM COATED ORAL at 17:12

## 2024-11-22 RX ADMIN — DOCUSATE SODIUM 100 MG: 100 CAPSULE, LIQUID FILLED ORAL at 08:32

## 2024-11-22 RX ADMIN — KETOROLAC TROMETHAMINE 15 MG: 30 INJECTION, SOLUTION INTRAMUSCULAR; INTRAVENOUS at 11:02

## 2024-11-22 RX ADMIN — KETOROLAC TROMETHAMINE 15 MG: 30 INJECTION, SOLUTION INTRAMUSCULAR; INTRAVENOUS at 05:58

## 2024-11-22 RX ADMIN — ACETAMINOPHEN 975 MG: 325 TABLET, FILM COATED ORAL at 05:58

## 2024-11-22 RX ADMIN — DIBASIC SODIUM PHOSPHATE, MONOBASIC POTASSIUM PHOSPHATE AND MONOBASIC SODIUM PHOSPHATE 2 TABLET: 852; 155; 130 TABLET ORAL at 14:35

## 2024-11-22 RX ADMIN — DOCUSATE SODIUM 100 MG: 100 CAPSULE, LIQUID FILLED ORAL at 17:12

## 2024-11-22 RX ADMIN — SODIUM PHOSPHATE, MONOBASIC, MONOHYDRATE AND SODIUM PHOSPHATE, DIBASIC, ANHYDROUS 21 MMOL: 142; 276 INJECTION, SOLUTION INTRAVENOUS at 16:30

## 2024-11-22 RX ADMIN — HEPARIN SODIUM 5000 UNITS: 5000 INJECTION, SOLUTION INTRAVENOUS; SUBCUTANEOUS at 21:31

## 2024-11-22 RX ADMIN — HEPARIN SODIUM 5000 UNITS: 5000 INJECTION, SOLUTION INTRAVENOUS; SUBCUTANEOUS at 13:31

## 2024-11-22 RX ADMIN — ACETAMINOPHEN 975 MG: 325 TABLET, FILM COATED ORAL at 13:37

## 2024-11-22 RX ADMIN — OXYCODONE HYDROCHLORIDE 10 MG: 10 TABLET ORAL at 14:34

## 2024-11-22 RX ADMIN — IBUPROFEN 600 MG: 600 TABLET, FILM COATED ORAL at 23:21

## 2024-11-22 NOTE — ASSESSMENT & PLAN NOTE
s/p exploratory laparotomy with TIM on (11/20/24)     - Epidural placed preoperatively, plan for pause trial today   - heparin DVT ppx appropriately held   - plan to pause with subsequent removal in 2-4hr     - start oxycodone 5mg/10mg PO q4h PRN moderate/severe pain   - continue dilaudid 0.5mg IV q2h PRN breakthrough pain     - continue acetaminophen 975mg PO q8h shruthi   - continue Toradol 15mg IV q6h shruthi x48h followed by ibuprofen   - IS OOB PT as able

## 2024-11-22 NOTE — ASSESSMENT & PLAN NOTE
S/P Venofer and 2U pRBCs  Lab Results   Component Value Date/Time    HGB 9.6 (L) 11/21/2024 04:39 AM    HGB 10.2 (L) 11/20/2024 01:19 PM    HGB 9.1 (L) 11/20/2024 04:29 AM

## 2024-11-22 NOTE — PROGRESS NOTES
Progress Note - Acute Pain   Name: Tatiana Lopez 39 y.o. female I MRN: [id number]  Unit/Bed#: [facility] 934-01 I Date of Admission: 11/17/2024   Date of Service: 11/22/2024 I Hospital Day: 4    Assessment & Plan  Fibroid uterus  s/p exploratory laparotomy with TIM on (11/20/24)     - Epidural placed preoperatively, plan for pause trial today   - heparin DVT ppx appropriately held   - plan to pause with subsequent removal in 2-4hr     - start oxycodone 5mg/10mg PO q4h PRN moderate/severe pain   - continue dilaudid 0.5mg IV q2h PRN breakthrough pain     - continue acetaminophen 975mg PO q8h shruthi   - continue Toradol 15mg IV q6h shruthi x48h followed by ibuprofen   - IS OOB PT as able      [facility] will continue to follow. Please contact Acute Pain Service - via SecureChat from [phone] with additional questions or concerns. See SecureChat or Amion for additional contacts and after hours information.     Subjective   Tatiana Lopez is a 39 y.o. female with PMHx of fibroids now s/p exploratory lapartomy with TIM on (11/20/24).  An epidural was placed for postoperative analgesia and APS was consulted for postoperative epidural management.    Today, patient was resting comfortably in the chair at bedside.  She reports continued good pain control with the epidural in place and denies any LAST symptoms such as headache, visual changes, tinnitus, perioral numbness/tingling, nausea, or vomiting.  We discussed that we will plan to pause the epidural this morning and introduce PO medications.  Later today we will evaluate and hopefully remove the epidural.  Patient was understanding and in agreement with the plan.     Pain History  Current pain location(s):  Pain Score: 2  Pain Location/Orientation: Location: Abdomen  Pain Scale: Pain Assessment Tool: 0-10  24 hour history: see assessment    Opioids Last 24h: none  PCEA Use Last 24h:  9 deliveries on 9 attempts    Meds/Allergies   all current active meds have been reviewed and current  meds:   Current Facility-Administered Medications:     acetaminophen (TYLENOL) tablet 975 mg, Q8H SAEN    docusate sodium (COLACE) capsule 100 mg, BID    ferrous gluconate (FERGON) tablet 324 mg, Daily With Breakfast    [Held by provider] heparin (porcine) subcutaneous injection 5,000 Units, Q8H SEAN    HYDROmorphone (DILAUDID) injection 0.5 mg, Q2H PRN    ketorolac (TORADOL) injection 15 mg, Q6H SEAN **FOLLOWED BY** ibuprofen (MOTRIN) tablet 600 mg, Q6H SEAN    ondansetron (ZOFRAN) injection 4 mg, Q6H PRN    oxyCODONE (ROXICODONE) immediate release tablet 10 mg, Q4H PRN    oxyCODONE (ROXICODONE) IR tablet 5 mg, Q4H PRN    ropivacaine 0.1% and fentaNYL 2 mcg/mL PCEA, Continuous    simethicone (MYLICON) chewable tablet 80 mg, Q6H PRN  Allergies   Allergen Reactions    Ceclor [Cefaclor]      Objective :  Temp:  [97.2 °F (36.2 °C)-98.9 °F (37.2 °C)] 97.2 °F (36.2 °C)  HR:  [63-80] 76  BP: ()/(55-81) 140/81  Resp:  [18] 18  SpO2:  [95 %-97 %] 97 %  O2 Device: None (Room air)    Physical Exam  Vitals and nursing note reviewed.   Constitutional:       General: She is not in acute distress.     Appearance: Normal appearance. She is not ill-appearing, toxic-appearing or diaphoretic.   HENT:      Head: Normocephalic and atraumatic.      Nose: Nose normal.      Mouth/Throat:      Mouth: Mucous membranes are moist.      Pharynx: Oropharynx is clear.   Eyes:      General: No scleral icterus.     Conjunctiva/sclera: Conjunctivae normal.   Cardiovascular:      Rate and Rhythm: Normal rate and regular rhythm.      Pulses: Normal pulses.      Heart sounds: Normal heart sounds.   Pulmonary:      Effort: Pulmonary effort is normal. No respiratory distress.      Breath sounds: Normal breath sounds. No wheezing.   Abdominal:      General: Abdomen is flat. There is no distension.      Palpations: Abdomen is soft.      Tenderness: There is abdominal tenderness (mild).   Musculoskeletal:      Cervical back: Normal range of motion and  neck supple. No rigidity or tenderness.   Skin:     General: Skin is warm and dry.   Neurological:      General: No focal deficit present.      Mental Status: She is alert and oriented to person, place, and time. Mental status is at baseline.      Sensory: Sensory deficit present.      Motor: No weakness.   Psychiatric:         Mood and Affect: Mood normal.         Behavior: Behavior normal.         Thought Content: Thought content normal.         Judgment: Judgment normal.      Epidural: Site clean/dry/intact, no surrounding erythema/edema/induration, infusion functioning appropriately        Lab Results: I have reviewed the following results:  Estimated Creatinine Clearance: 174.4 mL/min (A) (by C-G formula based on SCr of 0.46 mg/dL (L)).  Lab Results   Component Value Date    WBC 7.97 11/22/2024    HGB 9.4 (L) 11/22/2024    HCT 33.1 (L) 11/22/2024     11/22/2024         Component Value Date/Time    K 4.1 11/22/2024 0607     11/22/2024 0607    CO2 25 11/22/2024 0607    CO2 21 11/20/2024 1319    BUN 12 11/22/2024 0607    CREATININE 0.46 (L) 11/22/2024 0607         Component Value Date/Time    CALCIUM 7.4 (L) 11/22/2024 0607    ALKPHOS 51 09/11/2024 1132    AST 28 09/11/2024 1132    ALT 18 09/11/2024 1132    TP 7.0 09/11/2024 1132    ALB 3.7 09/11/2024 1132

## 2024-11-22 NOTE — ASSESSMENT & PLAN NOTE
S/P TIM, BS 11/20  - Hgb 8.0-> 7.5 -> 2U pRBC 11/18-> 9.3-> 1 unit intra-op-> 9.6 on POD1  Pain: Tylenol/toradol shruthi, PCEA  Plan for epidural removal today  : horner, anticipate VT after epidural removal  VTE ppx: SQH TID to start POD1, SCDs

## 2024-11-22 NOTE — DISCHARGE SUMMARY
Discharge Summary   Tatiana Lopez MRN: [id number]  Unit/Bed#: [facility] 934-01 Encounter: [id number]      Admission Date: 11/17/2024     Discharge Date: 11/24/24    Primary Gyn Onc Attending: Wilfrido Swartz MD    Principal Diagnosis: Abdominal pain [R10.9]  Uterine leiomyoma, unspecified location [D25.9]    Procedures:   Julito hysterectomy with bilateral salpingectomy 11/20/2024    Hospital course: Tatiana was admitted for on November 17 secondary to increasing abdominal pain caused by her known large uterine fibroids.  Patient had previously been seen for this issue in the outpatient setting by Dr. Angel with plans for TIM with bilateral salpingectomy on December 13.  However given patient's ongoing pain that was interfering with her activities of daily living, the decision was made to instead proceed with hysterectomy this admission.  Due to patient's anemia secondary to this issue and anticipated additional blood loss, patient was given 2 units of packed red blood cells on 11/18.  She was made n.p.o. at midnight on the morning of 11/20 and had an epidural placed preoperatively.  She then proceeded to the operating room at which time she underwent an uncomplicated total abdominal hysterectomy with bilateral salpingectomy.  Postoperatively Edwards was maintained.  Postop day 2 (11/22) anticoagulation was once again held and epidural removed and she was transitioned to oral medications for pain.  Anticoagulation was subsequently restarted.  That same day she had her Edwards removed and passed her void trial.  On post-operative day 3 (11/23) she was continuing to recover well with her pain controlled with oral pain medication.  On postoperative day 4 (11/24) patient continued to meet all postoperative milestones.  Her pain remained well-controlled and she continued to void spontaneously.  She was tolerating a regular diet and was requesting discharge home.    Discharge Problem List by Issue:   Fibroid  "uterus  Assessment & Plan  S/P JOSE DUMONT 11/20  - Hgb 8.0-> 7.5 -> 2U pRBC 11/18-> 9.3-> 1 unit intra-op-> 9.6 on POD1  Pain: Tylenol/toradol shruthi, fab prn  Edwards removed, voiding spontaneously   VTE ppx: SQH TID  SCDs    Learning disability  Assessment & Plan  Patient completed high school  Lives with mom, dad, and older brother  Makes her own medical decisions but her dad helps her    Anemia  Assessment & Plan  S/P Venofer and 2U pRBCs  Lab Results   Component Value Date/Time    HGB 10.6 (L) 11/24/2024 04:53 AM    HGB 10.9 (L) 11/23/2024 11:44 AM    HGB 9.4 (L) 11/22/2024 06:07 AM         * S/P JOSE DUMONT (total abdominal hysterectomy with bilateral salpingectomy)  Assessment & Plan  See \"fibroid uterus\"         Lab Results:   Lab Results   Component Value Date    WBC 7.08 11/24/2024    HGB 10.6 (L) 11/24/2024    HCT 36.5 11/24/2024    MCV 77 (L) 11/24/2024     11/24/2024     Lab Results   Component Value Date    GLUCOSE 104 11/20/2024    CALCIUM 7.8 (L) 11/24/2024    K 3.7 11/24/2024    CO2 26 11/24/2024     11/24/2024    BUN 6 11/24/2024    CREATININE 0.35 (L) 11/24/2024     Lab Results   Component Value Date/Time    POCGLU 89 07/01/2024 07:51 AM    POCGLU 93 06/30/2024 08:53 PM     Lab Results   Component Value Date    PTT 37 (H) 11/20/2024     Lab Results   Component Value Date    INR 1.21 (H) 11/20/2024    PROTIME 15.5 (H) 11/20/2024       Complications: none apparent     Condition at discharge: stable     Discharge instructions/Information to patient and family:   See After Visit Summary for information provided to patient and family.      Provisions for Follow-Up Care:  See After Visit Summary for information related to follow-up care and any pertinent home health orders.      Disposition: See After Visit Summary for discharge disposition information.    Planned Readmission: No    Discharge Medications:  For a complete list of the patient's medications, please refer to her med rec.         " Linnea Holguin  Obstetrics & Gynecology   11/24/2024  10:09 AM

## 2024-11-22 NOTE — RESTORATIVE TECHNICIAN NOTE
Restorative Technician Note      Patient Name: Tatiana Lopez     Restorative Tech Visit Date: 11/22/24  Note Type: Mobility  Patient Position Upon Consult: Bedside chair  Activity Performed: Ambulated  Assistive Device: Roller walker  Patient Position at End of Consult: Bedside chair; All needs within reach; Bed/Chair alarm activated  Nurse Communication: Nurse aware of consult, application of brace    Chilo Jordan, Restorative Technician

## 2024-11-22 NOTE — ED PROVIDER NOTES
Time reflects when diagnosis was documented in both MDM as applicable and the Disposition within this note       Time User Action Codes Description Comment    11/17/2024 10:55 AM Vi Chen [D25.9] Uterine leiomyoma, unspecified location     11/20/2024  1:08 PM Luis Fernando Miranda Keila [D25.9] Uterine leiomyoma, unspecified location           ED Disposition       None          Assessment & Plan       Medical Decision Making  Tatiana Lopez is a 39 y.o. who presents with complaints of abdominal cramping and increased abdominal girth    Vital signs are HD stable, afebrile    Ddx: intra-abdominal mass, doubt bowel obstruction, doubt bleeding    Plan: blood work as below significant for anemia  CXR WNL  CT a/p findings as below- enlarge uterus with multiple fibroids  Gyn-onc consulted    Disposition: admit to gyn-onc          Amount and/or Complexity of Data Reviewed  Labs: ordered.  Radiology: ordered.    Risk  Prescription drug management.             Medications   ferrous gluconate (FERGON) tablet 324 mg (has no administration in time range)   ketorolac (TORADOL) injection 15 mg (15 mg Intramuscular Given 11/17/24 0832)   iohexol (OMNIPAQUE) 350 MG/ML injection (MULTI-DOSE) 100 mL (100 mL Intravenous Given 11/17/24 1022)   iron sucrose (VENOFER) 200 mg in sodium chloride 0.9 % 100 mL IVPB (0 mg Intravenous Stopped 11/17/24 1918)   iron sucrose (VENOFER) 200 mg in sodium chloride 0.9 % 100 mL IVPB (has no administration in time range)       ED Risk Strat Scores                                               History of Present Illness       Chief Complaint   Patient presents with    Cyst     C/o of pain in her uterus, thinks her cysts are getting worse       History reviewed. No pertinent past medical history.   Past Surgical History:   Procedure Laterality Date    ANKLE FRACTURE SURGERY Left     per patient    HYSTERECTOMY N/A 11/20/2024    Procedure: HYSTERECTOMY TOTAL ABDOMINAL (TIM) UTERUS GREATER THAN  250GMS / EXAM UNDER ANESTHESIA (EUA);  Surgeon: Jewels Angel MD;  Location: [facility];  Service: Gynecology Oncology    LAPAROTOMY N/A 11/20/2024    Procedure: LAPAROTOMY EXPLORATORY;  Surgeon: Jewels Angel MD;  Location: [facility];  Service: Gynecology Oncology    REDUCTION MAMMAPLASTY Bilateral     SALPINGECTOMY Bilateral 11/20/2024    Procedure: SALPINGECTOMY;  Surgeon: Jewels Angel MD;  Location: [facility];  Service: Gynecology Oncology    SPINE SURGERY N/A     per patient    TONSILECTOMY AND ADNOIDECTOMY Bilateral       History reviewed. No pertinent family history.   Social History     Tobacco Use    Smoking status: Never    Smokeless tobacco: Never   Vaping Use    Vaping status: Never Used   Substance Use Topics    Alcohol use: Yes     Comment: occasional    Drug use: Never      E-Cigarette/Vaping    E-Cigarette Use Never User       E-Cigarette/Vaping Substances      I have reviewed and agree with the history as documented.     Patient is a 40 yo female with pertinent PMH of large leiomyoma of uterus who presents for evaluation of abdominal pain and increased abdominal girth. Patient states her pain has been progressively getting worse and is not improved with tylenol. She has not been taking NSAIDs by recommendation of her father. She also states her uterus is getting bigger, which has been making it difficult for her to move around. She also has had decreased appetite but is able to tolerate PO intake and SOB. Denies nausea, vomiting, fever, chills, vaginal bleeding, dysuria, hematuria, chest pain, lightheadedness, or dizziness. She is scheduled for total hysterectomy in December but is concerned she cannot wait that long.         Review of Systems   All other systems reviewed and are negative.          Objective       ED Triage Vitals   Temperature Pulse Blood Pressure Respirations SpO2 Patient Position - Orthostatic VS   11/17/24 0815 11/17/24 0815 11/17/24 0815 11/17/24 0815 11/17/24 0815 11/17/24  2157   (!) 97.4 °F (36.3 °C) 85 (!) 176/74 18 97 % Lying      Temp Source Heart Rate Source BP Location FiO2 (%) Pain Score    11/17/24 0815 11/17/24 0815 11/17/24 2157 -- 11/17/24 0815    Temporal Monitor Right arm  10 - Worst Possible Pain      Vitals      Date and Time Temp Pulse SpO2 Resp BP Pain Score FACES Pain Rating User   11/22/24 1102 -- -- -- -- -- 3 -- AS   11/22/24 1040 -- -- -- -- -- 3 -- AS   11/22/24 0900 -- -- -- -- -- 2 -- AS   11/22/24 0818 97.2 °F (36.2 °C) 76 97 % 18 140/81 -- -- DII   11/22/24 0558 -- -- -- -- -- 1 -- G   11/22/24 0300 -- -- -- -- -- -- Pt sleeping -- AG   11/22/24 0215 98.4 °F (36.9 °C) 64 95 % 18 135/70 -- -- DII   11/22/24 0214 98.4 °F (36.9 °C) 63 97 % 18 135/70 -- -- DII   11/21/24 2345 -- -- -- -- -- 1 -- JLG   11/21/24 2300 -- -- -- -- -- 1 -- G   11/21/24 2248 98.7 °F (37.1 °C) 70 97 % 18 116/59 -- -- DII   11/21/24 2232 -- -- -- -- -- 1 -- JLG   11/21/24 2155 98.9 °F (37.2 °C) 78 95 % 18 94/55 -- -- DII   11/21/24 1926 98.6 °F (37 °C) 76 96 % 18 134/75 -- -- DII   11/21/24 1900 -- -- -- -- -- No Pain -- JLG   11/21/24 1735 -- -- -- -- -- No Pain -- VITO   11/21/24 1729 -- -- -- -- -- No Pain -- VITO   11/21/24 1512 98.9 °F (37.2 °C) 70 96 % 18 121/61 -- -- DII   11/21/24 1422 -- -- -- -- -- No Pain -- VITO   11/21/24 1143 -- -- -- -- -- No Pain -- VITO   11/21/24 1100 -- -- -- -- -- 1 -- VITO   11/21/24 1045 98.6 °F (37 °C) 80 95 % -- 121/60 -- -- DII   11/21/24 1045 98.6 °F (37 °C) 77 95 % -- 121/60 -- -- DII   11/21/24 0911 -- -- -- -- -- 3 -- ML   11/21/24 0910 -- -- -- -- -- 3 -- NM   11/21/24 0730 -- -- -- -- -- No Pain -- VITO   11/21/24 0704 -- -- -- 16 -- -- -- VITO   11/21/24 0704 99.3 °F (37.4 °C) 65 95 % -- 120/62 -- -- DII   11/21/24 0544 -- 74 91 % -- -- -- -- AJ   11/21/24 0501 -- -- -- -- -- 1 -- AJ   11/21/24 0400 -- 61 97 % -- 105/50 -- -- AJ   11/21/24 0309 97.4 °F (36.3 °C) 64 96 % 19 106/50 -- -- DII   11/21/24 0200 -- 75 98 % -- -- No Pain --     11/21/24 0010 -- 95 96 % -- 119/65 -- -- AJ   11/21/24 0000 -- 83 97 % -- -- -- -- AJ   11/20/24 2340 -- 88 96 % -- 127/71 -- -- AJ   11/20/24 2310 -- 93 96 % -- 128/72 -- -- AJ   11/20/24 2250 99.7 °F (37.6 °C) 91 95 % 18 -- -- -- DII   11/20/24 2240 -- 94 96 % -- 133/76 -- -- AJ   11/20/24 2210 -- 93 96 % -- 132/76 -- -- AJ   11/20/24 2101 -- -- -- -- -- No Pain -- AJ   11/20/24 2100 -- -- -- -- -- No Pain -- AJ   11/20/24 1805 -- 83 94 % -- 129/73 -- -- AS   11/20/24 1705 -- 79 95 % -- 134/71 -- -- AS   11/20/24 1642 -- -- -- -- -- 3 -- SF   11/20/24 1635 -- 89 95 % -- 146/78 -- -- AS   11/20/24 1602 97.3 °F (36.3 °C) 81 96 % 18 145/77 -- -- DII   11/20/24 1500 -- 80 98 % 16 145/68 No Pain -- GD   11/20/24 1430 -- 86 98 % 23 150/71 No Pain -- GD   11/20/24 1425 -- 82 97 % 22 139/68 -- -- LT   11/20/24 1415 -- 82 Simultaneous filing. User may not have seen previous data. 97 % Simultaneous filing. User may not have seen previous data. 21 Simultaneous filing. User may not have seen previous data. 139/68 Simultaneous filing. User may not have seen previous data. -- -- LT   11/20/24 1408 97.5 °F (36.4 °C) 83 95 % 21 135/65 -- -- LT   11/20/24 1058 -- -- -- -- -- 9 -- LYNN   11/20/24 0800 -- -- 95 % -- -- 3 -- KN   11/20/24 0716 98.8 °F (37.1 °C) 82 92 % -- 142/86 -- -- DII   11/20/24 0021 -- -- -- -- -- 7 -- AM   11/19/24 2206 99.1 °F (37.3 °C) 81 96 % 18 140/85 -- -- DII   11/19/24 1527 98.6 °F (37 °C) 85 93 % 17 137/85 -- -- DII   11/19/24 1509 -- -- -- -- -- 8 -- SP   11/19/24 0814 -- -- -- -- -- 10 - Worst Possible Pain -- SP   11/19/24 0813 -- -- -- -- -- 10 - Worst Possible Pain -- SP   11/19/24 0756 98.5 °F (36.9 °C) 77 96 % 18 136/83 -- -- DII   11/19/24 0232 97.7 °F (36.5 °C) 65 96 % -- 151/87 -- -- DII   11/19/24 0042 98.1 °F (36.7 °C) 67 96 % 16 145/88 -- -- DII   11/19/24 0005 98.4 °F (36.9 °C) 73 96 % -- 143/91 -- -- DII   11/18/24 2345 -- -- -- 16 -- -- -- AM   11/18/24 2334 98.3 °F (36.8 °C) 79 96 % --  144/93 -- -- DII   11/18/24 2256 98.6 °F (37 °C) 79 95 % -- 151/97 -- -- DII   11/18/24 2207 -- -- -- -- -- 8 -- AM   11/18/24 2203 98.2 °F (36.8 °C) 78 96 % -- 161/97 -- -- DII   11/18/24 2130 -- -- -- -- -- 8 -- AM   11/18/24 2048 98.5 °F (36.9 °C) 73 97 % -- 163/96 -- -- DII   11/18/24 2010 98.3 °F (36.8 °C) 83 95 % -- 165/98 -- -- DII   11/18/24 2006 -- -- -- -- -- 8 -- AM   11/18/24 1935 98.6 °F (37 °C) 81 98 % 18 181/103 -- -- DII   11/18/24 1555 98.6 °F (37 °C) 76 97 % -- 143/91 -- -- DII   11/18/24 0951 -- -- -- -- -- 8 -- GDW   11/18/24 0730 98.4 °F (36.9 °C) 96 97 % 16 141/92 -- -- DII   11/18/24 0257 -- -- -- -- -- 8 -- JK   11/18/24 0100 -- -- -- -- -- 6 -- JK   11/17/24 2157 -- -- -- 18 -- -- -- TIM   11/17/24 2157 98.7 °F (37.1 °C) 81 96 % -- 144/92 -- -- DII   11/17/24 2021 98.8 °F (37.1 °C) 87 96 % -- 132/69 -- -- DII   11/17/24 1500 -- 78 99 % 18 164/68 6 -- HK   11/17/24 1400 -- 82 98 % 18 169/70 -- -- HK   11/17/24 1300 -- 74 97 % 20 172/74 -- -- HK   11/17/24 1230 -- 76 97 % 18 170/72 9 -- HK   11/17/24 1200 -- 70 97 % -- -- -- --    11/17/24 1100 -- 74 97 % -- 174/76 -- --    11/17/24 0832 -- -- -- -- -- 10 - Worst Possible Pain --    11/17/24 0815 97.4 °F (36.3 °C) 85 97 % 18 176/74 10 - Worst Possible Pain -- CS            Physical Exam  Constitutional:       General: She is not in acute distress.     Appearance: Normal appearance. She is not ill-appearing, toxic-appearing or diaphoretic.   HENT:      Head: Normocephalic and atraumatic.      Nose: Nose normal.      Mouth/Throat:      Mouth: Mucous membranes are moist.      Pharynx: Oropharynx is clear.   Eyes:      Extraocular Movements: Extraocular movements intact.      Conjunctiva/sclera: Conjunctivae normal.   Cardiovascular:      Rate and Rhythm: Normal rate and regular rhythm.      Pulses: Normal pulses.      Heart sounds: Normal heart sounds.   Pulmonary:      Effort: Pulmonary effort is normal.      Breath sounds: Normal breath  sounds.   Abdominal:      General: There is distension.      Tenderness: There is abdominal tenderness.      Comments: Uterine fundus palpated at level of xiphoid     Musculoskeletal:         General: Normal range of motion.      Cervical back: Normal range of motion and neck supple.   Skin:     General: Skin is warm and dry.      Capillary Refill: Capillary refill takes less than 2 seconds.   Neurological:      Mental Status: She is alert. Mental status is at baseline.         Results Reviewed       Procedure Component Value Units Date/Time    hCG, quantitative [[id number]]  (Normal) Collected: 11/17/24 0847    Lab Status: Final result Specimen: Blood from Arm, Right Updated: 11/17/24 1350     HCG, Quant <0.6 mIU/mL     Narrative:       Expected Ranges:    HCG results between 5.0 and 25.0 mIU/mL may be indicative of early pregnancy but should be interpreted in light of the total clinical presentation.    HCG can rise to detectable levels in shadia and post menopausal women (0-11.6 mIU/mL).     Approximate               Approximate HCG  Gestation age          Concentration ( mIU/mL)  _____________          ______________________   Weeks                      HCG values  0.2-1                       5-50  1-2                           2-3                         100-5000  3-4                         500-23343  4-5                         1000-56190  5-6                         [id number]  6-8                         [id number]  8-12                        [id number]      POCT pregnancy, urine [[id number]]  (Normal) Collected: 11/17/24 1007    Lab Status: Final result Updated: 11/17/24 1007     EXT Preg Test, Ur Negative     Control Valid    Basic metabolic panel [[id number]]  (Abnormal) Collected: 11/17/24 0847    Lab Status: Final result Specimen: Blood from Arm, Right Updated: 11/17/24 0920     Sodium 137 mmol/L      Potassium 3.8 mmol/L      Chloride 106 mmol/L      CO2 25 mmol/L      ANION GAP 6 mmol/L       BUN 11 mg/dL      Creatinine 0.47 mg/dL      Glucose 91 mg/dL      Calcium 9.0 mg/dL      eGFR 124 ml/min/1.73sq m     Narrative:      [employer] guidelines for Chronic Kidney Disease (CKD):     Stage 1 with normal or high GFR (GFR > 90 mL/min/1.73 square meters)    Stage 2 Mild CKD (GFR = 60-89 mL/min/1.73 square meters)    Stage 3A Moderate CKD (GFR = 45-59 mL/min/1.73 square meters)    Stage 3B Moderate CKD (GFR = 30-44 mL/min/1.73 square meters)    Stage 4 Severe CKD (GFR = 15-29 mL/min/1.73 square meters)    Stage 5 End Stage CKD (GFR <15 mL/min/1.73 square meters)  Note: GFR calculation is accurate only with a steady state creatinine    CBC and differential [[id number]]  (Abnormal) Collected: 11/17/24 0857    Lab Status: Final result Specimen: Blood from Arm, Right Updated: 11/17/24 0857     WBC 5.91 Thousand/uL      RBC 3.99 Million/uL      Hemoglobin 8.0 g/dL      Hematocrit 28.2 %      MCV 71 fL      MCH 20.1 pg      MCHC 28.4 g/dL      RDW 21.5 %      MPV 9.5 fL      Platelets 507 Thousands/uL      nRBC 0 /100 WBCs      Segmented % 70 %      Immature Grans % 0 %      Lymphocytes % 19 %      Monocytes % 8 %      Eosinophils Relative 2 %      Basophils Relative 1 %      Absolute Neutrophils 4.10 Thousands/µL      Absolute Immature Grans 0.01 Thousand/uL      Absolute Lymphocytes 1.14 Thousands/µL      Absolute Monocytes 0.47 Thousand/µL      Eosinophils Absolute 0.14 Thousand/µL      Basophils Absolute 0.05 Thousands/µL             XR chest portable   Final Interpretation by Felton Sue MD (11/19 7361)      No acute cardiopulmonary disease.            Workstation performed: [id number]         CT chest abdomen pelvis w contrast   Final Interpretation by Juan Boss MD (11/17 8992)      Markedly enlarged uterus with numerous leiomyomas extending into the subhepatic space. The largest pedunculated subserosal leiomyomas extending into the subhepatic space and  perisplenic region demonstrate large central areas of hypoattenuation, in    keeping with necrosis/degeneration. Possibility of sarcomatous degeneration is not entirely excluded.         Resident: YEN RODGERS I, the attending radiologist, have reviewed the images and agree with the final report above.      Workstation performed: [id number]             ECG 12 Lead Documentation Only    Date/Time: 11/22/2024 12:26 PM    Performed by: Vi Chen MD  Authorized by: Vi Chen MD    Patient location:  ED  Previous ECG:     Previous ECG:  Unavailable  Interpretation:     Interpretation: abnormal    Rate:     ECG rate:  82    ECG rate assessment: normal    Rhythm:     Rhythm: sinus rhythm    Ectopy:     Ectopy: none    QRS:     QRS axis:  Left    QRS intervals:  Normal  Conduction:     Conduction: normal    ST segments:     ST segments:  Normal  T waves:     T waves: inverted        ED Medication and Procedure Management   Prior to Admission Medications   Prescriptions Last Dose Informant Patient Reported? Taking?   ferrous gluconate (FERGON) 324 mg tablet  Self No No   Sig: Take 1 tablet (324 mg total) by mouth daily with breakfast   neomycin-polymyxin-dexamethasone (MAXITROL) 0.35%-10,000 units/g-0.1%  Self Yes No   Sig: PLACE SMALL AMOUNT IN LOWER EYELID AT BEDTIME FOR 7 DAYS START AFTER SURGERY   Patient not taking: Reported on 11/12/2024   norgestrel-ethinyl estradiol (Cryselle-28) 0.3 mg-30 mcg per tablet  Self No No   Sig: Take 1 tablet by mouth daily      Facility-Administered Medications: None     Current Discharge Medication List        CONTINUE these medications which have NOT CHANGED    Details   ferrous gluconate (FERGON) 324 mg tablet Take 1 tablet (324 mg total) by mouth daily with breakfast  Qty: 30 tablet, Refills: 1    Associated Diagnoses: Anemia      neomycin-polymyxin-dexamethasone (MAXITROL) 0.35%-10,000 units/g-0.1% PLACE SMALL AMOUNT IN LOWER EYELID AT BEDTIME FOR 7 DAYS  START AFTER SURGERY      norgestrel-ethinyl estradiol (Cryselle-28) 0.3 mg-30 mcg per tablet Take 1 tablet by mouth daily  Qty: 84 tablet, Refills: 0    Associated Diagnoses: Menorrhagia with regular cycle           No discharge procedures on file.  ED SEPSIS DOCUMENTATION   Time reflects when diagnosis was documented in both MDM as applicable and the Disposition within this note       Time User Action Codes Description Comment    11/17/2024 10:55 AM Vi Chen [D25.9] Uterine leiomyoma, unspecified location     11/20/2024  1:08 PM Miradna Gould Modify [D25.9] Uterine leiomyoma, unspecified location                  Vi Chen MD  11/22/24 1765

## 2024-11-22 NOTE — QUICK NOTE
Epidural paused earlier this morning and PO oxycodone started to facilitate transition from epidural.  Re-evaluated the patient this afternoon and slight increase in pain however still very manageable.  She denies any nausea or vomiting with the PO medications to this point.  She was eating lunch without issue in the chair at bedside. Discussed that given her good pain control with the pause, we will remove the epidural.  Patient was understanding and in agreement.     - epidural removed, tip intact    - start oxycodone 5mg/10mg PO q4h PRN moderate/severe pain   - consider robaxin 500mg PO q6h shruthi if develops muscle tightness/spasm pain

## 2024-11-22 NOTE — PROGRESS NOTES
"Progress Note - GYN Oncology   Name: Tatiana Lopez 39 y.o. female I MRN: [id number]  Unit/Bed#: [facility] 934-01 I Date of Admission: 11/17/2024   Date of Service: 11/22/2024 I Hospital Day: 4     Assessment & Plan  Anemia  S/P Venofer and 2U pRBCs  Lab Results   Component Value Date/Time    HGB 9.6 (L) 11/21/2024 04:39 AM    HGB 10.2 (L) 11/20/2024 01:19 PM    HGB 9.1 (L) 11/20/2024 04:29 AM       Learning disability  Patient completed high school  Lives with mom, dad, and older brother  Makes her own medical decisions but her dad helps her  Fibroid uterus  S/P TIM, BS 11/20  - Hgb 8.0-> 7.5 -> 2U pRBC 11/18-> 9.3-> 1 unit intra-op-> 9.6 on POD1  Pain: Tylenol/toradol shruthi, PCEA  Plan for epidural removal today  : horner, anticipate VT after epidural removal  VTE ppx: SQH TID to start POD1, SCDs    Subjective:    Tatiana Lopez has no current complaints.  Pain is well controlled with PCEA.  Overnight events: none. Patient is currently voiding via horner..  Patient is currently passing flatus and has had no bowel movement. She is tolerating PO, and denies nausea or vomiting. Patient denies fever, chills, chest pain, shortness of breath, or calf tenderness. Discussed plan for potential epidural removal today and void trial.     Objective:  /70   Pulse 64   Temp 98.4 °F (36.9 °C)   Resp 18   Ht 5' 4\" (1.626 m)   Wt 86.3 kg (190 lb 4.1 oz)   LMP 10/29/2024 (Exact Date)   SpO2 95%   BMI 32.66 kg/m²     I/O last 3 completed shifts:  In: 2582.7 [I.V.:2132.7; Blood:350; IV Piggyback:100]  Out: 975 [Urine:825; Blood:150]  I/O this shift:  In: 858.5 [P.O.:720; I.V.:138.5]  Out: 410 [Urine:410]    Lab Results   Component Value Date    WBC 12.53 (H) 11/21/2024    HGB 9.6 (L) 11/21/2024    HCT 33.4 (L) 11/21/2024    MCV 76 (L) 11/21/2024     (H) 11/21/2024       Lab Results   Component Value Date    GLUCOSE 104 11/20/2024    CALCIUM 7.3 (L) 11/21/2024    K 4.8 11/21/2024    CO2 20 (L) 11/21/2024     " 11/21/2024    BUN 11 11/21/2024    CREATININE 0.52 (L) 11/21/2024           Physical Exam  Constitutional:       General: She is not in acute distress.     Appearance: Normal appearance.   HENT:      Head: Normocephalic and atraumatic.   Cardiovascular:      Rate and Rhythm: Normal rate.      Pulses: Normal pulses.   Pulmonary:      Effort: Pulmonary effort is normal. No respiratory distress.      Breath sounds: Normal breath sounds.   Abdominal:      General: Abdomen is flat.      Palpations: Abdomen is soft.      Comments: Midline incision CDI     Genitourinary:     Comments: Edwards in place  Skin:     General: Skin is warm and dry.   Neurological:      General: No focal deficit present.      Mental Status: She is alert.   Psychiatric:         Mood and Affect: Mood normal.         Behavior: Behavior normal.           Linnea Holguin MD  11/22/2024  6:16 AM

## 2024-11-23 LAB
ANION GAP SERPL CALCULATED.3IONS-SCNC: 4 MMOL/L (ref 4–13)
BASOPHILS # BLD AUTO: 0.05 THOUSANDS/ÂΜL (ref 0–0.1)
BASOPHILS NFR BLD AUTO: 1 % (ref 0–1)
BUN SERPL-MCNC: 8 MG/DL (ref 5–25)
CALCIUM SERPL-MCNC: 7.9 MG/DL (ref 8.4–10.2)
CHLORIDE SERPL-SCNC: 106 MMOL/L (ref 96–108)
CO2 SERPL-SCNC: 27 MMOL/L (ref 21–32)
CREAT SERPL-MCNC: 0.42 MG/DL (ref 0.6–1.3)
EOSINOPHIL # BLD AUTO: 0.15 THOUSAND/ÂΜL (ref 0–0.61)
EOSINOPHIL NFR BLD AUTO: 2 % (ref 0–6)
ERYTHROCYTE [DISTWIDTH] IN BLOOD BY AUTOMATED COUNT: 25 % (ref 11.6–15.1)
GFR SERPL CREATININE-BSD FRML MDRD: 129 ML/MIN/1.73SQ M
GLUCOSE SERPL-MCNC: 78 MG/DL (ref 65–140)
HCT VFR BLD AUTO: 36.6 % (ref 34.8–46.1)
HGB BLD-MCNC: 10.9 G/DL (ref 11.5–15.4)
IMM GRANULOCYTES # BLD AUTO: 0.02 THOUSAND/UL (ref 0–0.2)
IMM GRANULOCYTES NFR BLD AUTO: 0 % (ref 0–2)
LYMPHOCYTES # BLD AUTO: 1.07 THOUSANDS/ÂΜL (ref 0.6–4.47)
LYMPHOCYTES NFR BLD AUTO: 12 % (ref 14–44)
MAGNESIUM SERPL-MCNC: 2 MG/DL (ref 1.9–2.7)
MCH RBC QN AUTO: 22.6 PG (ref 26.8–34.3)
MCHC RBC AUTO-ENTMCNC: 29.8 G/DL (ref 31.4–37.4)
MCV RBC AUTO: 76 FL (ref 82–98)
MONOCYTES # BLD AUTO: 0.56 THOUSAND/ÂΜL (ref 0.17–1.22)
MONOCYTES NFR BLD AUTO: 7 % (ref 4–12)
NEUTROPHILS # BLD AUTO: 6.83 THOUSANDS/ÂΜL (ref 1.85–7.62)
NEUTS SEG NFR BLD AUTO: 78 % (ref 43–75)
NRBC BLD AUTO-RTO: 0 /100 WBCS
PHOSPHATE SERPL-MCNC: 2 MG/DL (ref 2.7–4.5)
PLATELET # BLD AUTO: 381 THOUSANDS/UL (ref 149–390)
PMV BLD AUTO: 9.4 FL (ref 8.9–12.7)
POTASSIUM SERPL-SCNC: 4.3 MMOL/L (ref 3.5–5.3)
RBC # BLD AUTO: 4.83 MILLION/UL (ref 3.81–5.12)
SODIUM SERPL-SCNC: 137 MMOL/L (ref 135–147)
WBC # BLD AUTO: 8.68 THOUSAND/UL (ref 4.31–10.16)

## 2024-11-23 PROCEDURE — 99024 POSTOP FOLLOW-UP VISIT: CPT | Performed by: OBSTETRICS & GYNECOLOGY

## 2024-11-23 PROCEDURE — 85025 COMPLETE CBC W/AUTO DIFF WBC: CPT

## 2024-11-23 PROCEDURE — 83735 ASSAY OF MAGNESIUM: CPT

## 2024-11-23 PROCEDURE — 84100 ASSAY OF PHOSPHORUS: CPT

## 2024-11-23 PROCEDURE — 99232 SBSQ HOSP IP/OBS MODERATE 35: CPT | Performed by: ANESTHESIOLOGY

## 2024-11-23 PROCEDURE — 80048 BASIC METABOLIC PNL TOTAL CA: CPT

## 2024-11-23 RX ORDER — METHOCARBAMOL 500 MG/1
500 TABLET, FILM COATED ORAL EVERY 6 HOURS SCHEDULED
Status: DISCONTINUED | OUTPATIENT
Start: 2024-11-23 | End: 2024-11-24 | Stop reason: HOSPADM

## 2024-11-23 RX ADMIN — IBUPROFEN 600 MG: 600 TABLET, FILM COATED ORAL at 12:02

## 2024-11-23 RX ADMIN — DOCUSATE SODIUM 100 MG: 100 CAPSULE, LIQUID FILLED ORAL at 08:35

## 2024-11-23 RX ADMIN — DIBASIC SODIUM PHOSPHATE, MONOBASIC POTASSIUM PHOSPHATE AND MONOBASIC SODIUM PHOSPHATE 2 TABLET: 852; 155; 130 TABLET ORAL at 14:14

## 2024-11-23 RX ADMIN — HEPARIN SODIUM 5000 UNITS: 5000 INJECTION, SOLUTION INTRAVENOUS; SUBCUTANEOUS at 06:37

## 2024-11-23 RX ADMIN — ACETAMINOPHEN 975 MG: 325 TABLET, FILM COATED ORAL at 22:53

## 2024-11-23 RX ADMIN — ACETAMINOPHEN 975 MG: 325 TABLET, FILM COATED ORAL at 13:31

## 2024-11-23 RX ADMIN — FERROUS GLUCONATE 324 MG: 324 TABLET ORAL at 08:35

## 2024-11-23 RX ADMIN — ACETAMINOPHEN 975 MG: 325 TABLET, FILM COATED ORAL at 06:36

## 2024-11-23 RX ADMIN — OXYCODONE HYDROCHLORIDE 10 MG: 10 TABLET ORAL at 08:36

## 2024-11-23 RX ADMIN — HEPARIN SODIUM 5000 UNITS: 5000 INJECTION, SOLUTION INTRAVENOUS; SUBCUTANEOUS at 22:53

## 2024-11-23 RX ADMIN — SODIUM PHOSPHATE, MONOBASIC, MONOHYDRATE AND SODIUM PHOSPHATE, DIBASIC, ANHYDROUS 12 MMOL: 142; 276 INJECTION, SOLUTION INTRAVENOUS at 14:14

## 2024-11-23 RX ADMIN — IBUPROFEN 600 MG: 600 TABLET, FILM COATED ORAL at 06:36

## 2024-11-23 RX ADMIN — METHOCARBAMOL 500 MG: 500 TABLET ORAL at 17:04

## 2024-11-23 RX ADMIN — IBUPROFEN 600 MG: 600 TABLET, FILM COATED ORAL at 17:04

## 2024-11-23 RX ADMIN — METHOCARBAMOL 500 MG: 500 TABLET ORAL at 12:02

## 2024-11-23 RX ADMIN — DOCUSATE SODIUM 100 MG: 100 CAPSULE, LIQUID FILLED ORAL at 17:04

## 2024-11-23 RX ADMIN — HEPARIN SODIUM 5000 UNITS: 5000 INJECTION, SOLUTION INTRAVENOUS; SUBCUTANEOUS at 13:31

## 2024-11-23 NOTE — ASSESSMENT & PLAN NOTE
S/P TIM, BS 11/20  - Hgb 8.0-> 7.5 -> 2U pRBC 11/18-> 9.3-> 1 unit intra-op-> 9.6 on POD1  Pain: Tylenol/toradol shruthi, PCEA  Plan for epidural removal today  Edwards removed, voiding spontaneously   VTE ppx: SQH TID  SCDs

## 2024-11-23 NOTE — PROGRESS NOTES
Progress Note - Acute Pain   Name: Tatiana Lopez 39 y.o. female I MRN: [id number]  Unit/Bed#: [facility] 934-01 I Date of Admission: 11/17/2024   Date of Service: 11/23/2024 I Hospital Day: 5    Assessment & Plan  Fibroid uterus  s/p exploratory laparotomy with TIM on (11/20/24)     - thoracic epidural removed yesterday (11/22/24) with transition to PO regimen   - continue oxycodone 5mg/10mg PO q4h PRN moderate/severe pain   - continue dilaudid 0.5mg IV q2h PRN breakthrough pain     - continue acetaminophen 975mg PO q8h shruthi   - s/p Toradol 15mg IV q6h shruthi x48h, now on ibuprofen 600mg   - start robaxin 500mg PO q6h shruthi   - IS OOB PT as able      APS will sign off at this time. Thank you for the consult. All opioids and other analgesics to be written at discretion of primary team. Please contact Acute Pain Service - via [employer] from [phone] with additional questions or concerns. See [employer] or [employer] for additional contacts and after hours information.    Subjective   Tatiana Lopez is a 39 y.o. female with PMHx of fibroids now s/p exploratory lapartomy with TIM on (11/20/24).  An epidural was placed for postoperative analgesia and APS was consulted for postoperative epidural management.      Yesterday epidural was removed after pause trial and transition to PO regimen.  Patient with increase in pain however managed with the current medications.  Discussed the addition of robaxin for abdominal tightness/spasm pain.  Otherwise no acute issues with epidural removal yesterday.     Pain History  Current pain location(s):  Pain Score: 9  Pain Location/Orientation: Location: Abdomen  Pain Scale: Pain Assessment Tool: 0-10  24 hour history: see assessment    Opioid requirement previous 24 hours: oxycodone 10mg PO x2 doses, oxycodone 5mg PO x2 doses, no IV PRN     Meds/Allergies   all current active meds have been reviewed and current meds:   Current Facility-Administered Medications:     acetaminophen (TYLENOL) tablet  975 mg, Q8H SEAN    docusate sodium (COLACE) capsule 100 mg, BID    ferrous gluconate (FERGON) tablet 324 mg, Daily With Breakfast    heparin (porcine) subcutaneous injection 5,000 Units, Q8H SEAN    HYDROmorphone (DILAUDID) injection 0.5 mg, Q2H PRN    [] ketorolac (TORADOL) injection 15 mg, Q6H SEAN **FOLLOWED BY** ibuprofen (MOTRIN) tablet 600 mg, Q6H SEAN    methocarbamol (ROBAXIN) tablet 500 mg, Q6H SEAN    ondansetron (ZOFRAN) injection 4 mg, Q6H PRN    oxyCODONE (ROXICODONE) immediate release tablet 10 mg, Q4H PRN    oxyCODONE (ROXICODONE) IR tablet 5 mg, Q4H PRN    simethicone (MYLICON) chewable tablet 80 mg, Q6H PRN  Allergies   Allergen Reactions    Ceclor [Cefaclor]      Objective :  Temp:  [97.6 °F (36.4 °C)-98.7 °F (37.1 °C)] 97.6 °F (36.4 °C)  HR:  [71-86] 76  BP: (142-164)/(82-94) 162/94  Resp:  [18] 18  SpO2:  [96 %-97 %] 96 %    Physical Exam  Vitals and nursing note reviewed.   Constitutional:       General: She is not in acute distress.     Appearance: Normal appearance. She is not ill-appearing, toxic-appearing or diaphoretic.   HENT:      Head: Normocephalic and atraumatic.      Nose: Nose normal.      Mouth/Throat:      Mouth: Mucous membranes are moist.      Pharynx: Oropharynx is clear.   Eyes:      General: No scleral icterus.     Conjunctiva/sclera: Conjunctivae normal.   Cardiovascular:      Rate and Rhythm: Normal rate and regular rhythm.      Pulses: Normal pulses.      Heart sounds: Normal heart sounds.   Pulmonary:      Effort: Pulmonary effort is normal. No respiratory distress.      Breath sounds: Normal breath sounds. No wheezing.   Abdominal:      General: Abdomen is flat. There is no distension.      Tenderness: There is abdominal tenderness.   Musculoskeletal:      Cervical back: Normal range of motion and neck supple. No rigidity or tenderness.   Skin:     General: Skin is warm and dry.      Coloration: Skin is not jaundiced or pale.   Neurological:      General: No focal  deficit present.      Mental Status: She is alert and oriented to person, place, and time. Mental status is at baseline.      Sensory: No sensory deficit.      Motor: No weakness.            Lab Results: I have reviewed the following results:  Estimated Creatinine Clearance: 174.4 mL/min (A) (by C-G formula based on SCr of 0.46 mg/dL (L)).  Lab Results   Component Value Date    WBC 7.97 11/22/2024    HGB 9.4 (L) 11/22/2024    HCT 33.1 (L) 11/22/2024     11/22/2024         Component Value Date/Time    K 4.1 11/22/2024 0607     11/22/2024 0607    CO2 25 11/22/2024 0607    CO2 21 11/20/2024 1319    BUN 12 11/22/2024 0607    CREATININE 0.46 (L) 11/22/2024 0607         Component Value Date/Time    CALCIUM 7.4 (L) 11/22/2024 0607    ALKPHOS 51 09/11/2024 1132    AST 28 09/11/2024 1132    ALT 18 09/11/2024 1132    TP 7.0 09/11/2024 1132    ALB 3.7 09/11/2024 1132

## 2024-11-23 NOTE — CASE MANAGEMENT
Case Management Discharge Planning Note    Patient name Tatiana Lopez  Location [facility] 934/[facility] 934-01 MRN [id number]  : 1985 Date 2024       Current Admission Date: 2024  Current Admission Diagnosis:Fibroid uterus   Patient Active Problem List    Diagnosis Date Noted Date Diagnosed    Fibroid uterus 2024     Iron deficiency anemia due to chronic blood loss 2024     Learning disability 2024     Anemia 2024     Overweight 2015     Family history of diabetes mellitus 2015       LOS (days): 5  Geometric Mean LOS (GMLOS) (days): 2.7  Days to GMLOS:-1.9     OBJECTIVE:  Risk of Unplanned Readmission Score: 11.98         Current admission status: Inpatient   Preferred Pharmacy:   UNKNOWN - FOLLOW UP PRIOR TO DISCHARGE TO E-PRESCRIBE  No address on file      [facility] [address] [place] [id number]  Phone: [phone] Fax: [phone]    Primary Care Provider: Sonido Desir MD    Primary Insurance: [employer]  Secondary Insurance:     DISCHARGE DETAILS:                               Additional Comments: CM reviewed pt chart. Pt has no CM DC needs at this time. Possible DC in 24-48 hours. CM will continue to follow as needed.

## 2024-11-23 NOTE — PROGRESS NOTES
"Progress Note - GYN Oncology   Name: Tatiana Lopez 39 y.o. female I MRN: [id number]  Unit/Bed#: [facility] 934-01 I Date of Admission: 11/17/2024   Date of Service: 11/23/2024 I Hospital Day: 5     Assessment & Plan  Anemia  S/P Venofer and 2U pRBCs  Lab Results   Component Value Date/Time    HGB 9.4 (L) 11/22/2024 06:07 AM    HGB 9.6 (L) 11/21/2024 04:39 AM    HGB 10.2 (L) 11/20/2024 01:19 PM       Learning disability  Patient completed high school  Lives with mom, dad, and older brother  Makes her own medical decisions but her dad helps her  Fibroid uterus  S/P TIM, BS 11/20  - Hgb 8.0-> 7.5 -> 2U pRBC 11/18-> 9.3-> 1 unit intra-op-> 9.6 on POD1  Pain: Tylenol/toradol shruthi, PCEA  Plan for epidural removal today  Edwards removed, voiding spontaneously   VTE ppx: SQH TID  SCDs    Subjective:    Tatiana Lopez has no current complaints.  Pain is well controlled with Po medications.  Overnight events: none. Patient is currently voiding.  She is ambulating.  Patient is currently passing flatus and has had no bowel movement. She is tolerating PO, and denies nausea or vomiting. Patient denies fever, chills, chest pain, shortness of breath, or calf tenderness. She would like to stay until tomorrow.     Objective:  /94   Pulse 76   Temp 97.6 °F (36.4 °C)   Resp 18   Ht 5' 4\" (1.626 m)   Wt 86.3 kg (190 lb 4.1 oz)   LMP 10/29/2024 (Exact Date)   SpO2 96%   BMI 32.66 kg/m²     I/O last 3 completed shifts:  In: 1792.5 [P.O.:1374; I.V.:168.5; IV Piggyback:250]  Out: 3690 [Urine:3690]  No intake/output data recorded.    Lab Results   Component Value Date    WBC 7.97 11/22/2024    HGB 9.4 (L) 11/22/2024    HCT 33.1 (L) 11/22/2024    MCV 79 (L) 11/22/2024     11/22/2024       Lab Results   Component Value Date    GLUCOSE 104 11/20/2024    CALCIUM 7.4 (L) 11/22/2024    K 4.1 11/22/2024    CO2 25 11/22/2024     11/22/2024    BUN 12 11/22/2024    CREATININE 0.46 (L) 11/22/2024           Physical " Exam  Constitutional:       General: She is not in acute distress.     Appearance: Normal appearance.   HENT:      Head: Normocephalic and atraumatic.   Cardiovascular:      Rate and Rhythm: Normal rate.      Pulses: Normal pulses.   Pulmonary:      Effort: Pulmonary effort is normal. No respiratory distress.      Breath sounds: Normal breath sounds.   Abdominal:      General: Abdomen is flat.      Palpations: Abdomen is soft.   Skin:     General: Skin is warm and dry.   Neurological:      General: No focal deficit present.      Mental Status: She is alert.   Psychiatric:         Mood and Affect: Mood normal.         Behavior: Behavior normal.           Linnea Holguin MD  11/23/2024  10:47 AM

## 2024-11-23 NOTE — ASSESSMENT & PLAN NOTE
s/p exploratory laparotomy with TIM on (11/20/24)     - thoracic epidural removed yesterday (11/22/24) with transition to PO regimen   - continue oxycodone 5mg/10mg PO q4h PRN moderate/severe pain   - continue dilaudid 0.5mg IV q2h PRN breakthrough pain     - continue acetaminophen 975mg PO q8h shruthi   - s/p Toradol 15mg IV q6h shruthi x48h, now on ibuprofen 600mg   - start robaxin 500mg PO q6h shruthi   - IS OOB PT as able

## 2024-11-23 NOTE — ASSESSMENT & PLAN NOTE
S/P Venofer and 2U pRBCs  Lab Results   Component Value Date/Time    HGB 9.4 (L) 11/22/2024 06:07 AM    HGB 9.6 (L) 11/21/2024 04:39 AM    HGB 10.2 (L) 11/20/2024 01:19 PM

## 2024-11-23 NOTE — PLAN OF CARE
Problem: PAIN - ADULT  Goal: Verbalizes/displays adequate comfort level or baseline comfort level  Description: Interventions:  - Encourage patient to monitor pain and request assistance  - Assess pain using appropriate pain scale  - Administer analgesics based on type and severity of pain and evaluate response  - Implement non-pharmacological measures as appropriate and evaluate response  - Consider cultural and social influences on pain and pain management  - Notify physician/advanced practitioner if interventions unsuccessful or patient reports new pain  Outcome: Progressing     Problem: INFECTION - ADULT  Goal: Absence or prevention of progression during hospitalization  Description: INTERVENTIONS:  - Assess and monitor for signs and symptoms of infection  - Monitor lab/diagnostic results  - Monitor all insertion sites, i.e. indwelling lines, tubes, and drains  - Monitor endotracheal if appropriate and nasal secretions for changes in amount and color  - [place] appropriate cooling/warming therapies per order  - Administer medications as ordered  - Instruct and encourage patient and family to use good hand hygiene technique  - Identify and instruct in appropriate isolation precautions for identified infection/condition  Outcome: Progressing     Problem: SAFETY ADULT  Goal: Maintain or return to baseline ADL function  Description: INTERVENTIONS:  -  Assess patient's ability to carry out ADLs; assess patient's baseline for ADL function and identify physical deficits which impact ability to perform ADLs (bathing, care of mouth/teeth, toileting, grooming, dressing, etc.)  - Assess/evaluate cause of self-care deficits   - Assess range of motion  - Assess patient's mobility; develop plan if impaired  - Assess patient's need for assistive devices and provide as appropriate  - Encourage maximum independence but intervene and supervise when necessary  - Involve family in performance of ADLs  - Assess for home care  needs following discharge   - Consider OT consult to assist with ADL evaluation and planning for discharge  - Provide patient education as appropriate  Outcome: Progressing

## 2024-11-24 VITALS
SYSTOLIC BLOOD PRESSURE: 157 MMHG | HEIGHT: 64 IN | DIASTOLIC BLOOD PRESSURE: 91 MMHG | OXYGEN SATURATION: 97 % | TEMPERATURE: 98.4 F | RESPIRATION RATE: 17 BRPM | WEIGHT: 190.26 LBS | HEART RATE: 83 BPM | BODY MASS INDEX: 32.48 KG/M2

## 2024-11-24 PROBLEM — Z90.710 S/P TAH (TOTAL ABDOMINAL HYSTERECTOMY): Status: ACTIVE | Noted: 2024-11-24

## 2024-11-24 LAB
ANION GAP SERPL CALCULATED.3IONS-SCNC: 8 MMOL/L (ref 4–13)
BUN SERPL-MCNC: 6 MG/DL (ref 5–25)
CALCIUM SERPL-MCNC: 7.8 MG/DL (ref 8.4–10.2)
CHLORIDE SERPL-SCNC: 107 MMOL/L (ref 96–108)
CO2 SERPL-SCNC: 26 MMOL/L (ref 21–32)
CREAT SERPL-MCNC: 0.35 MG/DL (ref 0.6–1.3)
ERYTHROCYTE [DISTWIDTH] IN BLOOD BY AUTOMATED COUNT: 25.5 % (ref 11.6–15.1)
GFR SERPL CREATININE-BSD FRML MDRD: 137 ML/MIN/1.73SQ M
GLUCOSE SERPL-MCNC: 75 MG/DL (ref 65–140)
HCT VFR BLD AUTO: 36.5 % (ref 34.8–46.1)
HGB BLD-MCNC: 10.6 G/DL (ref 11.5–15.4)
MAGNESIUM SERPL-MCNC: 2 MG/DL (ref 1.9–2.7)
MCH RBC QN AUTO: 22.3 PG (ref 26.8–34.3)
MCHC RBC AUTO-ENTMCNC: 29 G/DL (ref 31.4–37.4)
MCV RBC AUTO: 77 FL (ref 82–98)
PHOSPHATE SERPL-MCNC: 2.5 MG/DL (ref 2.7–4.5)
PLATELET # BLD AUTO: 385 THOUSANDS/UL (ref 149–390)
PMV BLD AUTO: 10.6 FL (ref 8.9–12.7)
POTASSIUM SERPL-SCNC: 3.7 MMOL/L (ref 3.5–5.3)
RBC # BLD AUTO: 4.75 MILLION/UL (ref 3.81–5.12)
SODIUM SERPL-SCNC: 141 MMOL/L (ref 135–147)
WBC # BLD AUTO: 7.08 THOUSAND/UL (ref 4.31–10.16)

## 2024-11-24 PROCEDURE — 83735 ASSAY OF MAGNESIUM: CPT

## 2024-11-24 PROCEDURE — 80048 BASIC METABOLIC PNL TOTAL CA: CPT

## 2024-11-24 PROCEDURE — 84100 ASSAY OF PHOSPHORUS: CPT

## 2024-11-24 PROCEDURE — 85027 COMPLETE CBC AUTOMATED: CPT

## 2024-11-24 RX ORDER — ACETAMINOPHEN 325 MG/1
975 TABLET ORAL EVERY 6 HOURS SCHEDULED
Qty: 360 TABLET | Refills: 0 | Status: SHIPPED | OUTPATIENT
Start: 2024-11-24

## 2024-11-24 RX ORDER — DOCUSATE SODIUM 100 MG/1
100 CAPSULE, LIQUID FILLED ORAL 2 TIMES DAILY
Qty: 60 CAPSULE | Refills: 0 | Status: SHIPPED | OUTPATIENT
Start: 2024-11-24

## 2024-11-24 RX ORDER — OXYCODONE HYDROCHLORIDE 5 MG/1
5 TABLET ORAL EVERY 4 HOURS PRN
Qty: 10 TABLET | Refills: 0 | Status: SHIPPED | OUTPATIENT
Start: 2024-11-24 | End: 2024-12-04

## 2024-11-24 RX ORDER — IBUPROFEN 600 MG/1
600 TABLET, FILM COATED ORAL EVERY 6 HOURS SCHEDULED
Qty: 30 TABLET | Refills: 0 | Status: SHIPPED | OUTPATIENT
Start: 2024-11-24

## 2024-11-24 RX ADMIN — DOCUSATE SODIUM 100 MG: 100 CAPSULE, LIQUID FILLED ORAL at 09:21

## 2024-11-24 RX ADMIN — ACETAMINOPHEN 975 MG: 325 TABLET, FILM COATED ORAL at 04:54

## 2024-11-24 RX ADMIN — METHOCARBAMOL 500 MG: 500 TABLET ORAL at 11:32

## 2024-11-24 RX ADMIN — METHOCARBAMOL 500 MG: 500 TABLET ORAL at 00:27

## 2024-11-24 RX ADMIN — FERROUS GLUCONATE 324 MG: 324 TABLET ORAL at 09:21

## 2024-11-24 RX ADMIN — HEPARIN SODIUM 5000 UNITS: 5000 INJECTION, SOLUTION INTRAVENOUS; SUBCUTANEOUS at 04:54

## 2024-11-24 RX ADMIN — METHOCARBAMOL 500 MG: 500 TABLET ORAL at 04:54

## 2024-11-24 RX ADMIN — IBUPROFEN 600 MG: 600 TABLET, FILM COATED ORAL at 04:55

## 2024-11-24 RX ADMIN — IBUPROFEN 600 MG: 600 TABLET, FILM COATED ORAL at 11:32

## 2024-11-24 RX ADMIN — IBUPROFEN 600 MG: 600 TABLET, FILM COATED ORAL at 00:27

## 2024-11-24 NOTE — PLAN OF CARE
Problem: INFECTION - ADULT  Goal: Absence or prevention of progression during hospitalization  Description: INTERVENTIONS:  - Assess and monitor for signs and symptoms of infection  - Monitor lab/diagnostic results  - Monitor all insertion sites, i.e. indwelling lines, tubes, and drains  - Monitor endotracheal if appropriate and nasal secretions for changes in amount and color  - [place] appropriate cooling/warming therapies per order  - Administer medications as ordered  - Instruct and encourage patient and family to use good hand hygiene technique  - Identify and instruct in appropriate isolation precautions for identified infection/condition  Outcome: Progressing     Problem: PAIN - ADULT  Goal: Verbalizes/displays adequate comfort level or baseline comfort level  Description: Interventions:  - Encourage patient to monitor pain and request assistance  - Assess pain using appropriate pain scale  - Administer analgesics based on type and severity of pain and evaluate response  - Implement non-pharmacological measures as appropriate and evaluate response  - Consider cultural and social influences on pain and pain management  - Notify physician/advanced practitioner if interventions unsuccessful or patient reports new pain  Outcome: Progressing

## 2024-11-24 NOTE — ASSESSMENT & PLAN NOTE
S/P TIM, BS 11/20  - Hgb 8.0-> 7.5 -> 2U pRBC 11/18-> 9.3-> 1 unit intra-op-> 9.6 on POD1  Pain: Tylenol/toradol shruthi, fab prn  Edwards removed, voiding spontaneously   VTE ppx: SQH TID  SCDs

## 2024-11-24 NOTE — ASSESSMENT & PLAN NOTE
S/P Venofer and 2U pRBCs  Lab Results   Component Value Date/Time    HGB 10.6 (L) 11/24/2024 04:53 AM    HGB 10.9 (L) 11/23/2024 11:44 AM    HGB 9.4 (L) 11/22/2024 06:07 AM

## 2024-11-24 NOTE — NURSING NOTE
Pt discharged to home.  Reviewed AVS with patient, wound care and follow up visits.  Reviewed when to call MD Pt verbalized understanding.  Pt requested walker at time of discharge.  Notified  and she stated she could get a wheelchair to be delivered tomorrow to pt's home.  Pt agreeable for that delivery plan.  Pt left in stable conditions with all belongings.

## 2024-11-24 NOTE — CASE MANAGEMENT
Case Management Discharge Planning Note    Patient name Tatiana Lopez  Location [facility] 934/[facility] 934-01 MRN [id number]  : 1985 Date 2024       Current Admission Date: 2024  Current Admission Diagnosis:S/P TIM, BS (total abdominal hysterectomy with bilateral salpingectomy)   Patient Active Problem List    Diagnosis Date Noted Date Diagnosed    S/P TIM, BS (total abdominal hysterectomy with bilateral salpingectomy) 2024     Fibroid uterus 2024     Iron deficiency anemia due to chronic blood loss 2024     Learning disability 2024     Anemia 2024     Overweight 2015     Family history of diabetes mellitus 2015       LOS (days): 6  Geometric Mean LOS (GMLOS) (days): 2.7  Days to GMLOS:-3.1     OBJECTIVE:  Risk of Unplanned Readmission Score: 11.22         Current admission status: Inpatient   Preferred Pharmacy:   UNKNOWN - FOLLOW UP PRIOR TO DISCHARGE TO E-PRESCRIBE  No address on file      [facility] [address] [place] [address]  Phone: [phone] Fax: [phone]    Primary Care Provider: Sonido Desir MD    Primary Insurance: [employer]  Secondary Insurance:     DISCHARGE DETAILS:    Discharge planning discussed with:: Patient  Freedom of Choice: Yes  Comments - Freedom of Choice: Discussed FOC  CM contacted family/caregiver?: No- see comments (Declined)           DME Referral Provided  Referral made for DME?: Yes  DME referral completed for the following items:: Walker  DME Supplier Name:: [email or web address]    Other Referral/Resources/Interventions Provided:  Referral Comments: Received message from bedside nurse pt is requesting a RW to be ordered and delivered to her home.  No documentation noted that pt needs a RW for dc.  CM confirmed w/bedside nurse pt cleared by PT w/o AD but pt has been using RW since hospital stay for support - pt requesting order for RW.  Explained to bedside nurse  to notify pt - if CM delivers RW today - unsure if pt has a copay or any OOP costs.  Bedside nurse notified CM pt feels safe discharging home w/o RW but would like it ordered to be delivered to home for support.  CM placed order in [place] - sent to attending for signature.              IMM Given (Date):: 11/24/24  IMM Given to:: Patient        This CM reviewed IMM w/pt - pt verbalized understanding and in agreement w/dcp. Pt signed IMM form. Placed in MR bin.    Per pt - her parents will transport her home later today.

## 2024-11-24 NOTE — PROGRESS NOTES
"Progress Note - GYN Oncology   Name: Tatiana Lopez 39 y.o. female I MRN: [id number]  Unit/Bed#: [facility] 934-01 I Date of Admission: 11/17/2024   Date of Service: 11/24/2024 I Hospital Day: 6     Assessment & Plan  Anemia  S/P Venofer and 2U pRBCs  Lab Results   Component Value Date/Time    HGB 10.6 (L) 11/24/2024 04:53 AM    HGB 10.9 (L) 11/23/2024 11:44 AM    HGB 9.4 (L) 11/22/2024 06:07 AM       Learning disability  Patient completed high school  Lives with mom, dad, and older brother  Makes her own medical decisions but her dad helps her  Fibroid uterus  S/P TIM, BS 11/20  - Hgb 8.0-> 7.5 -> 2U pRBC 11/18-> 9.3-> 1 unit intra-op-> 9.6 on POD1  Pain: Tylenol/toradol shruthi, fab prn  Edwards removed, voiding spontaneously   VTE ppx: SQH TID  SCDs    Subjective:    Tatiana Lopez has no current complaints.  Pain is well controlled with oral medications.  Overnight events: none. Patient is currently voiding.  She is ambulating.  Patient is not currently passing flatus and has had no bowel movement. She is tolerating PO, and denies nausea or vomiting. Patient denies fever, chills, chest pain, shortness of breath, or calf tenderness. She says that she is ready to go home today. Discharge instructions reviewed.     Objective:  /91   Pulse 83   Temp 98.4 °F (36.9 °C)   Resp 17   Ht 5' 4\" (1.626 m)   Wt 86.3 kg (190 lb 4.1 oz)   LMP 10/29/2024 (Exact Date)   SpO2 97%   BMI 32.66 kg/m²     I/O last 3 completed shifts:  In: 1520 [P.O.:1240; I.V.:30; IV Piggyback:250]  Out: 4100 [Urine:4100]  No intake/output data recorded.    Lab Results   Component Value Date    WBC 7.08 11/24/2024    HGB 10.6 (L) 11/24/2024    HCT 36.5 11/24/2024    MCV 77 (L) 11/24/2024     11/24/2024       Lab Results   Component Value Date    GLUCOSE 104 11/20/2024    CALCIUM 7.8 (L) 11/24/2024    K 3.7 11/24/2024    CO2 26 11/24/2024     11/24/2024    BUN 6 11/24/2024    CREATININE 0.35 (L) 11/24/2024           Physical " Exam  Constitutional:       General: She is not in acute distress.     Appearance: Normal appearance.   HENT:      Head: Normocephalic and atraumatic.   Cardiovascular:      Rate and Rhythm: Normal rate.      Pulses: Normal pulses.   Pulmonary:      Effort: Pulmonary effort is normal. No respiratory distress.      Breath sounds: Normal breath sounds.   Abdominal:      General: Abdomen is flat.      Palpations: Abdomen is soft.      Comments: Midline incision CDI   Skin:     General: Skin is warm and dry.   Neurological:      General: No focal deficit present.      Mental Status: She is alert.   Psychiatric:         Mood and Affect: Mood normal.         Behavior: Behavior normal.           Linnea Holguin MD  11/24/2024  9:39 AM

## 2024-11-25 ENCOUNTER — TELEPHONE (OUTPATIENT)
Dept: GYNECOLOGIC ONCOLOGY | Facility: CLINIC | Age: 39
End: 2024-11-25

## 2024-11-25 ENCOUNTER — TELEPHONE (OUTPATIENT)
Dept: HEMATOLOGY ONCOLOGY | Facility: CLINIC | Age: 39
End: 2024-11-25

## 2024-11-25 LAB
DME PARACHUTE DELIVERY DATE REQUESTED: NORMAL
DME PARACHUTE ITEM DESCRIPTION: NORMAL
DME PARACHUTE ORDER STATUS: NORMAL
DME PARACHUTE SUPPLIER NAME: NORMAL
DME PARACHUTE SUPPLIER PHONE: NORMAL

## 2024-11-25 PROCEDURE — 88307 TISSUE EXAM BY PATHOLOGIST: CPT | Performed by: PATHOLOGY

## 2024-11-25 NOTE — UTILIZATION REVIEW
NOTIFICATION OF ADMISSION DISCHARGE   This is a Notification of Discharge from [facility]. Please be advised that this patient has been discharge from our facility. Below you will find the admission and discharge date and time including the patient’s disposition.   UTILIZATION REVIEW CONTACT:  Reza Sanford  Utilization   Network Utilization Review Department  Phone: [phone] x carefully listen to the prompts. All voicemails are confidential.  Email: [email or web address]     ADMISSION INFORMATION  PRESENTATION DATE: 11/17/2024  8:16 AM  OBERVATION ADMISSION DATE: 11/17/2024 1447  INPATIENT ADMISSION DATE: 11/18/24  6:15 PM   DISCHARGE DATE: 11/24/2024  2:00 PM   DISPOSITION:Home/Self Care    Network Utilization Review Department  ATTENTION: Please call with any questions or concerns to [phone] and carefully listen to the prompts so that you are directed to the right person. All voicemails are confidential.   For Discharge needs, contact Care Management DC Support Team at [phone] opt. 2  Send all requests for admission clinical reviews, approved or denied determinations and any other requests to dedicated fax number below belonging to the campus where the patient is receiving treatment. List of dedicated fax numbers for the Facilities:  FACILITY NAME UR FAX NUMBER   ADMISSION DENIALS (Administrative/Medical Necessity) [phone]   DISCHARGE SUPPORT TEAM ([facility]) [phone]   PARENT CHILD HEALTH (Maternity/NICU/Pediatrics) [phone]   [facility] [phone]   [facility] [phone]   [facility] [phone]   [facility] [phone]   [facility] [phone]   [facility] [phone]   [facility] [phone]   [facility]  [phone]   [facility] [phone]   [facility] [phone]   [facility] [phone]   [facility] [phone]

## 2024-11-25 NOTE — TELEPHONE ENCOUNTER
Called and spoke with patient that 1 st post op of TIM on 11/20 is scheduled for 12/10 at 1:45 pm in [place] with .  Patient confirmed and will inform her father.

## 2024-11-27 ENCOUNTER — TELEPHONE (OUTPATIENT)
Dept: GYNECOLOGIC ONCOLOGY | Facility: CLINIC | Age: 39
End: 2024-11-27

## 2024-11-27 NOTE — TELEPHONE ENCOUNTER
Called and spoke with patient about reschedule on 12/10.  Patient was rescheduled to 12/10 at 10 am in [place] with Dr. Angel.  Patient confirmed.

## 2024-12-09 NOTE — PROGRESS NOTES
"Name: Tatiana Lopez      : 1985      MRN: [id number]  Encounter Provider: Jewels Angel MD  Encounter Date: 12/10/2024   Encounter department: [facility] [place]  :  Assessment & Plan  S/P TIM, BS (total abdominal hysterectomy with bilateral salpingectomy)  40yo with anemia and fibroid uterus s/p TIM/BS presents for post op    Recovering well post op   Continue lifting restrictions    RTC 4 weeks for recheck.                 History of Present Illness   Reason for Visit / CC: post op    Tatiana Lopez is a 39 y.o. female   40yo with anemia and fibroid uterus s/p TIM/BS presents for post op.  Recovering well post operative. Denies vaginal bleeding/discharge. Normal BM/urination. No pelvic pain. Appetite adequate. Mild abdominal discomfort on movement, wearing a binder.               Pertinent Medical History         Review of Systems   Constitutional: Negative.    HENT: Negative.     Eyes: Negative.    Respiratory: Negative.     Cardiovascular: Negative.    Gastrointestinal:  Positive for abdominal pain.   Endocrine: Negative.    Genitourinary: Negative.    Musculoskeletal: Negative.    Neurological: Negative.    Psychiatric/Behavioral: Negative.      A complete review of systems is negative other than that noted above in the HPI.       Objective   /90   Pulse 79   Temp 98 °F (36.7 °C)   Ht 5' 4\" (1.626 m)   Wt 79.4 kg (175 lb)   SpO2 98%   BMI 30.04 kg/m²     Body mass index is 30.04 kg/m².  ECOG ECOG Performance Status: 0 - Fully active, able to carry on all pre-disease performance without restriction   Physical Exam  HENT:      Head: Normocephalic and atraumatic.   Cardiovascular:      Rate and Rhythm: Normal rate and regular rhythm.   Pulmonary:      Effort: Pulmonary effort is normal.   Abdominal:      General: There is no distension.      Palpations: Abdomen is soft. There is no mass.      Comments: Incisions c/d/i   Genitourinary:     Comments: Chaperone " "present for exam.  The external female genitalia is normal. The bartholin's, uretheral and skenes glands are normal. The urethral meatus is normal (midline with no lesions). Anus without fissure or lesion. Speculum exam reveals a grossly normal vagina cuff that is healing well. No masses, lesions,discharge or bleeding.  Bimanual exam notes a surgical absent cervix, uterus and adnexal structures. No masses or fullness. Bladder is without fullness, mass or tenderness.      Musculoskeletal:         General: Normal range of motion.      Cervical back: Normal range of motion.   Skin:     General: Skin is warm and dry.   Neurological:      Mental Status: She is alert and oriented to person, place, and time.          Labs: I have reviewed pertinent labs.   No results found for: \"\"  Lab Results   Component Value Date    K 3.7 11/24/2024     11/24/2024    CO2 26 11/24/2024    BUN 6 11/24/2024    CREATININE 0.35 (L) 11/24/2024    GLUCOSE 104 11/20/2024    GLUF 73 11/12/2024    CALCIUM 7.8 (L) 11/24/2024    AST 28 09/11/2024    ALT 18 09/11/2024    ALKPHOS 51 09/11/2024    EGFR 137 11/24/2024     Lab Results   Component Value Date    WBC 7.08 11/24/2024    HGB 10.6 (L) 11/24/2024    HCT 36.5 11/24/2024    MCV 77 (L) 11/24/2024     11/24/2024     Lab Results   Component Value Date    NEUTROABS 6.83 11/23/2024        Trend:  No results found for: \"\"            "

## 2024-12-10 ENCOUNTER — OFFICE VISIT (OUTPATIENT)
Dept: GYNECOLOGIC ONCOLOGY | Facility: CLINIC | Age: 39
End: 2024-12-10

## 2024-12-10 VITALS
WEIGHT: 175 LBS | DIASTOLIC BLOOD PRESSURE: 90 MMHG | OXYGEN SATURATION: 98 % | TEMPERATURE: 98 F | BODY MASS INDEX: 29.88 KG/M2 | SYSTOLIC BLOOD PRESSURE: 130 MMHG | HEART RATE: 79 BPM | HEIGHT: 64 IN

## 2024-12-10 DIAGNOSIS — Z90.710 S/P TAH (TOTAL ABDOMINAL HYSTERECTOMY): Primary | ICD-10-CM

## 2024-12-10 PROCEDURE — 99024 POSTOP FOLLOW-UP VISIT: CPT | Performed by: OBSTETRICS & GYNECOLOGY

## 2024-12-10 NOTE — ASSESSMENT & PLAN NOTE
38yo with anemia and fibroid uterus s/p TIM/BS presents for post op    Recovering well post op   Continue lifting restrictions    RTC 4 weeks for recheck.

## 2024-12-30 ENCOUNTER — TELEPHONE (OUTPATIENT)
Dept: HEMATOLOGY ONCOLOGY | Facility: CLINIC | Age: 39
End: 2024-12-30

## 2024-12-31 ENCOUNTER — TELEPHONE (OUTPATIENT)
Age: 39
End: 2024-12-31

## 2024-12-31 ENCOUNTER — TELEPHONE (OUTPATIENT)
Dept: GYNECOLOGIC ONCOLOGY | Facility: CLINIC | Age: 39
End: 2024-12-31

## 2024-12-31 NOTE — TELEPHONE ENCOUNTER
Called to inform patient that her ride has been scheduled for her post-op appointment with provider.

## 2024-12-31 NOTE — TELEPHONE ENCOUNTER
Pt requested round trip transportation for her appt on 1/6/25 at 10:20am. Pt would also like a call back letting her know that this was completed at [phone]. Thank you.

## 2025-01-06 ENCOUNTER — OFFICE VISIT (OUTPATIENT)
Dept: HEMATOLOGY ONCOLOGY | Facility: CLINIC | Age: 40
End: 2025-01-06
Payer: COMMERCIAL

## 2025-01-06 VITALS
WEIGHT: 176 LBS | RESPIRATION RATE: 16 BRPM | BODY MASS INDEX: 30.05 KG/M2 | HEART RATE: 80 BPM | DIASTOLIC BLOOD PRESSURE: 90 MMHG | OXYGEN SATURATION: 97 % | HEIGHT: 64 IN | SYSTOLIC BLOOD PRESSURE: 136 MMHG | TEMPERATURE: 98 F

## 2025-01-06 DIAGNOSIS — D50.0 IRON DEFICIENCY ANEMIA DUE TO CHRONIC BLOOD LOSS: Primary | ICD-10-CM

## 2025-01-06 PROCEDURE — 99213 OFFICE O/P EST LOW 20 MIN: CPT | Performed by: INTERNAL MEDICINE

## 2025-01-06 NOTE — ASSESSMENT & PLAN NOTE
39-year-old -American female with a learning disability and severe iron deficiency anemia due to chronic, sever gynecologic blood loss caused by bulky uterine fibroids and massively enlarged uterus. Her excessive vaginal bleeding during menstrual cycles is the cause of her iron deficiency anemia. On 09/30/2024, she re-initiated oral contraceptives for bleeding control prescribed by the Gyn service. Patient may require a hysterectomy to control her heavy menses which have resulted in severe iron deficiency anemia. The required transfusion of 4 units of pRBCs in late June 2024 and parenteral plus oral iron replacement therapy with moderate improvement of her severe iron deficiency anemia. She initiated oral contraceptive agents on September 30, 2024 to improve heavy menses and to lessen her severe, chronic blood loss. With OCPs, she noticed improvement of menses with less severe bleeding as compared to the past several months. CBC with differential on 10/04/2024 showed microcytic anemia with a hemoglobin of 7.7 g/dL, hematocrit of 26.7%.  On October 4, 2024, the patient received an infusion of iron sucrose (Venofer) 300 mg IV. With oral and parenteral iron supplementation, the patient had slow improvement of her severe iron deficiency anemia.    Interim Assessment: On November 20, 2024, the patient underwent pelvic examination under anesthesia, exploratory laparotomy with total abdominal hysterectomy, bilateral salpingectomy. Pathology showed numerous pedunculated and intrauterine leiomyomas, up to 33 cm in greatest dimension. There was non-proliferative endometrium with stromal pseudo-decidualization consistent with progesterone effect. Benign bilateral fallopian tubes and benign cervix.  There was no evidence of malignancy.  The patient recovered well after her recent gynecologic surgery. Today, shed does not have new symptoms or complaints.  I expect resolution of iron deficiency since the patient's  menorrhagia has been resolved after recent total abdominal hysterectomy.     Plan:  CBC with differential, reticulocyte count, and serum iron profile in early April 2025  Currently, the patient does not have indication for transfusion of leukoreduced packed red blood cells  RTC in mid April 2025 for H7P and to review CBC and serum iron study results.

## 2025-01-06 NOTE — PROGRESS NOTES
Name: Tatiana Lopez      : 1985      MRN: [id number]  Encounter Provider: Luz Zamora MD  Encounter Date: 2025   Encounter department: [facility] [place]  :  Assessment & Plan  Iron deficiency anemia due to chronic blood loss  39-year-old -American female with a learning disability and severe iron deficiency anemia due to chronic, sever gynecologic blood loss caused by bulky uterine fibroids and massively enlarged uterus. Her excessive vaginal bleeding during menstrual cycles is the cause of her iron deficiency anemia. On 2024, she re-initiated oral contraceptives for bleeding control prescribed by the Gyn service. Patient may require a hysterectomy to control her heavy menses which have resulted in severe iron deficiency anemia. The required transfusion of 4 units of pRBCs in late 2024 and parenteral plus oral iron replacement therapy with moderate improvement of her severe iron deficiency anemia. She initiated oral contraceptive agents on 2024 to improve heavy menses and to lessen her severe, chronic blood loss. With OCPs, she noticed improvement of menses with less severe bleeding as compared to the past several months. CBC with differential on 10/04/2024 showed microcytic anemia with a hemoglobin of 7.7 g/dL, hematocrit of 26.7%.  On 2024, the patient received an infusion of iron sucrose (Venofer) 300 mg IV. With oral and parenteral iron supplementation, the patient had slow improvement of her severe iron deficiency anemia.    Interim Assessment: On 2024, the patient underwent pelvic examination under anesthesia, exploratory laparotomy with total abdominal hysterectomy, bilateral salpingectomy. Pathology showed numerous pedunculated and intrauterine leiomyomas, up to 33 cm in greatest dimension. There was non-proliferative endometrium with stromal pseudo-decidualization consistent with progesterone effect.  Benign bilateral fallopian tubes and benign cervix.  There was no evidence of malignancy.  The patient recovered well after her recent gynecologic surgery. Today, shed does not have new symptoms or complaints.  I expect resolution of iron deficiency since the patient's menorrhagia has been resolved after recent total abdominal hysterectomy.     Plan:  CBC with differential, reticulocyte count, and serum iron profile in early April 2025  Currently, the patient does not have indication for transfusion of leukoreduced packed red blood cells  RTC in mid April 2025 for H7P and to review CBC and serum iron study results.          The patient understands and agrees with my management recommendations and plan of care. I answered questions to her satisfaction.        History of Present Illness   No chief complaint on file.  39 y.o. female with severe iron deficiency anemia secondary to chronic blood loss, caused by heavy menses, caused by bulky uterine fibroids. On November 20, 2024, the patient underwent pelvic examination under anesthesia, exploratory laparotomy with total abdominal hysterectomy, bilateral salpingectomy pathology showed numerous pedunculated and intrauterine leiomyomas, up to 33 cm in greatest dimension.      In summary, The patient has history of learning disability however no other significant past medical history who was initially seen by hematology during hospitalization on 6/29/2024.  Patient had outpatient blood work and found to have a hemoglobin of 3.7.  She was sent to the ER for further evaluation.  Anemia was felt to be secondary to acute menstrual blood loss.  Patient has a history of heavy periods.  She also follows with gynecology.  Patient received 4 units PRBCs and Venofer 300 mg x 3 doses.  She was discharged on oral iron.  Subsequently, she was referred to hematology clinic for outpatient management of severe iron deficiency anemia. The patient initiated oral contraceptive agents (OCPs)  on September 30, 2024 to improve heavy menses and to lessen her severe, chronic gyn blood loss. With OCPs, she has noticed improvement of menses with less severe bleeding as compared to the past several months. Her most recent CBC with differential on 10/04/2024 showed microcytic anemia with a hemoglobin of 7.7 g/dL, hematocrit of 26.7%.  On October 4, 2024, the patient received an infusion of iron sucrose (Venofer) 300 mg IV. With oral and parenteral iron supplementation, the patient had slow improvement of her severe iron deficiency anemia.     Interim history: On November 20, 2024, the patient underwent pelvic examination under anesthesia, exploratory laparotomy with total abdominal hysterectomy, bilateral salpingectomy pathology showed numerous pedunculated and intrauterine leiomyomas, up to 33 cm in greatest dimension. There was non-proliferative endometrium with stromal pseudo-decidualization consistent with progesterone effect. Benign bilateral fallopian tubes and benign cervix.  There was no evidence of malignancy.  The patient recover well after her recent gynecologic surgery. Today, shed does not have new symptoms or complaints. She refers resolution of fatigue.  The patient denies new systemic, cardiorespiratory, gastrointestinal, genitourinary, gynecological, muscle skeletal, or neurologic symptoms.    Pertinent Medical History   01/05/25:  Learning disability  Iron deficiency anemia due to menorrhagia  Bulky, large uterine fibroids    Review of Systems   Constitutional:  Negative for activity change, appetite change, chills, diaphoresis, fatigue, fever and unexpected weight change.   HENT:  Negative for congestion, dental problem, ear discharge, ear pain, facial swelling, hearing loss, mouth sores, nosebleeds, postnasal drip, rhinorrhea, sinus pain, sneezing, sore throat, tinnitus, trouble swallowing and voice change.    Eyes:  Negative for photophobia, pain, discharge, redness, itching and visual  disturbance.   Respiratory:  Negative for apnea, cough, choking, chest tightness, shortness of breath, wheezing and stridor.    Cardiovascular:  Negative for chest pain, palpitations and leg swelling.   Gastrointestinal:  Negative for abdominal distention, abdominal pain, anal bleeding, blood in stool, constipation, diarrhea, nausea, rectal pain and vomiting.   Endocrine: Negative for cold intolerance and heat intolerance.   Genitourinary:  Negative for decreased urine volume, difficulty urinating, dysuria, enuresis, flank pain, frequency, hematuria and urgency.   Musculoskeletal:  Negative for arthralgias, back pain, gait problem, joint swelling, myalgias, neck pain and neck stiffness.   Skin:  Negative for color change, pallor, rash and wound.   Neurological:  Negative for dizziness, tremors, seizures, syncope, facial asymmetry, speech difficulty, weakness, light-headedness, numbness and headaches.   Hematological:  Negative for adenopathy. Does not bruise/bleed easily.   Psychiatric/Behavioral:  Negative for behavioral problems, confusion, dysphoric mood and sleep disturbance. The patient is not nervous/anxious.          Objective   There were no vitals taken for this visit.    Physical Exam  Constitutional:       Comments: -American female well-nourished, without acute respiratory distress   HENT:      Head: Normocephalic and atraumatic.      Nose: Nose normal.      Mouth/Throat:      Mouth: Mucous membranes are moist.      Pharynx: Oropharynx is clear.   Eyes:      Extraocular Movements: Extraocular movements intact.      Conjunctiva/sclera: Conjunctivae normal.      Pupils: Pupils are equal, round, and reactive to light.      Comments: No scleral icterus   Neck:      Comments: No cervical, supraclavicular, axillary, epitrochlear, or inguinal lymphadenopathy.   Cardiovascular:      Rate and Rhythm: Normal rate and regular rhythm.      Pulses: Normal pulses.      Heart sounds: Normal heart sounds.    Pulmonary:      Effort: Pulmonary effort is normal.      Breath sounds: Normal breath sounds.   Abdominal:      General: Bowel sounds are normal.      Palpations: Abdomen is soft.      Comments: Abdomen soft, nontender, nonpainful.  No hepatomegaly.  No splenomegaly.  No rebound tenderness.  No lateral or shifting dullness.  Bowel sounds present, normal.    Musculoskeletal:         General: Normal range of motion.      Cervical back: Normal range of motion and neck supple.   Skin:     General: Skin is warm and dry.      Capillary Refill: Capillary refill takes less than 2 seconds.   Neurological:      General: No focal deficit present.      Mental Status: She is alert and oriented to person, place, and time. Mental status is at baseline.   Psychiatric:         Mood and Affect: Mood normal.         Behavior: Behavior normal.         Thought Content: Thought content normal.         Judgment: Judgment normal.       Labs: I have reviewed the following labs:  Results for orders placed or performed during the hospital encounter of 11/17/24   CBC and differential   Result Value Ref Range    WBC 5.91 4.31 - 10.16 Thousand/uL    RBC 3.99 3.81 - 5.12 Million/uL    Hemoglobin 8.0 (L) 11.5 - 15.4 g/dL    Hematocrit 28.2 (L) 34.8 - 46.1 %    MCV 71 (L) 82 - 98 fL    MCH 20.1 (L) 26.8 - 34.3 pg    MCHC 28.4 (L) 31.4 - 37.4 g/dL    RDW 21.5 (H) 11.6 - 15.1 %    MPV 9.5 8.9 - 12.7 fL    Platelets 507 (H) 149 - 390 Thousands/uL    nRBC 0 /100 WBCs    Segmented % 70 43 - 75 %    Immature Grans % 0 0 - 2 %    Lymphocytes % 19 14 - 44 %    Monocytes % 8 4 - 12 %    Eosinophils Relative 2 0 - 6 %    Basophils Relative 1 0 - 1 %    Absolute Neutrophils 4.10 1.85 - 7.62 Thousands/µL    Absolute Immature Grans 0.01 0.00 - 0.20 Thousand/uL    Absolute Lymphocytes 1.14 0.60 - 4.47 Thousands/µL    Absolute Monocytes 0.47 0.17 - 1.22 Thousand/µL    Eosinophils Absolute 0.14 0.00 - 0.61 Thousand/µL    Basophils Absolute 0.05 0.00 - 0.10  Thousands/µL   Basic metabolic panel   Result Value Ref Range    Sodium 137 135 - 147 mmol/L    Potassium 3.8 3.5 - 5.3 mmol/L    Chloride 106 96 - 108 mmol/L    CO2 25 21 - 32 mmol/L    ANION GAP 6 4 - 13 mmol/L    BUN 11 5 - 25 mg/dL    Creatinine 0.47 (L) 0.60 - 1.30 mg/dL    Glucose 91 65 - 140 mg/dL    Calcium 9.0 8.4 - 10.2 mg/dL    eGFR 124 ml/min/1.73sq m   hCG, quantitative   Result Value Ref Range    HCG, Quant <0.6 0 - 5 mIU/mL   Result Value Ref Range    Magnesium 1.9 1.9 - 2.7 mg/dL   Result Value Ref Range    Phosphorus 3.1 2.7 - 4.5 mg/dL   Basic metabolic panel   Result Value Ref Range    Sodium 139 135 - 147 mmol/L    Potassium 3.5 3.5 - 5.3 mmol/L    Chloride 107 96 - 108 mmol/L    CO2 24 21 - 32 mmol/L    ANION GAP 8 4 - 13 mmol/L    BUN 9 5 - 25 mg/dL    Creatinine 0.47 (L) 0.60 - 1.30 mg/dL    Glucose 82 65 - 140 mg/dL    Calcium 8.6 8.4 - 10.2 mg/dL    eGFR 124 ml/min/1.73sq m   CBC (with platelets)   Result Value Ref Range    WBC 6.24 4.31 - 10.16 Thousand/uL    RBC 3.77 (L) 3.81 - 5.12 Million/uL    Hemoglobin 7.5 (L) 11.5 - 15.4 g/dL    Hematocrit 27.1 (L) 34.8 - 46.1 %    MCV 72 (L) 82 - 98 fL    MCH 19.9 (L) 26.8 - 34.3 pg    MCHC 27.7 (L) 31.4 - 37.4 g/dL    RDW 21.2 (H) 11.6 - 15.1 %    Platelets 480 (H) 149 - 390 Thousands/uL    MPV 10.0 8.9 - 12.7 fL   Result Value Ref Range    Magnesium 1.9 1.9 - 2.7 mg/dL   Result Value Ref Range    Phosphorus 3.1 2.7 - 4.5 mg/dL   Basic metabolic panel   Result Value Ref Range    Sodium 138 135 - 147 mmol/L    Potassium 3.8 3.5 - 5.3 mmol/L    Chloride 106 96 - 108 mmol/L    CO2 23 21 - 32 mmol/L    ANION GAP 9 4 - 13 mmol/L    BUN 10 5 - 25 mg/dL    Creatinine 0.59 (L) 0.60 - 1.30 mg/dL    Glucose 83 65 - 140 mg/dL    Calcium 8.4 8.4 - 10.2 mg/dL    eGFR 115 ml/min/1.73sq m   CBC (with platelets)   Result Value Ref Range    WBC 8.64 4.31 - 10.16 Thousand/uL    RBC 4.44 3.81 - 5.12 Million/uL    Hemoglobin 9.3 (L) 11.5 - 15.4 g/dL    Hematocrit 33.0  (L) 34.8 - 46.1 %    MCV 74 (L) 82 - 98 fL    MCH 20.9 (L) 26.8 - 34.3 pg    MCHC 28.2 (L) 31.4 - 37.4 g/dL    RDW 22.2 (H) 11.6 - 15.1 %    Platelets 490 (H) 149 - 390 Thousands/uL    MPV 9.9 8.9 - 12.7 fL   Basic metabolic panel   Result Value Ref Range    Sodium 138 135 - 147 mmol/L    Potassium 3.8 3.5 - 5.3 mmol/L    Chloride 106 96 - 108 mmol/L    CO2 24 21 - 32 mmol/L    ANION GAP 8 4 - 13 mmol/L    BUN 9 5 - 25 mg/dL    Creatinine 0.50 (L) 0.60 - 1.30 mg/dL    Glucose 63 (L) 65 - 140 mg/dL    Calcium 8.3 (L) 8.4 - 10.2 mg/dL    eGFR 122 ml/min/1.73sq m   CBC and differential   Result Value Ref Range    WBC 10.09 4.31 - 10.16 Thousand/uL    RBC 4.24 3.81 - 5.12 Million/uL    Hemoglobin 9.1 (L) 11.5 - 15.4 g/dL    Hematocrit 31.7 (L) 34.8 - 46.1 %    MCV 75 (L) 82 - 98 fL    MCH 21.5 (L) 26.8 - 34.3 pg    MCHC 28.7 (L) 31.4 - 37.4 g/dL    RDW 22.2 (H) 11.6 - 15.1 %    MPV 10.0 8.9 - 12.7 fL    Platelets 447 (H) 149 - 390 Thousands/uL    nRBC 1 /100 WBCs    Segmented % 68 43 - 75 %    Immature Grans % 0 0 - 2 %    Lymphocytes % 19 14 - 44 %    Monocytes % 9 4 - 12 %    Eosinophils Relative 3 0 - 6 %    Basophils Relative 1 0 - 1 %    Absolute Neutrophils 6.93 1.85 - 7.62 Thousands/µL    Absolute Immature Grans 0.04 0.00 - 0.20 Thousand/uL    Absolute Lymphocytes 1.87 0.60 - 4.47 Thousands/µL    Absolute Monocytes 0.89 0.17 - 1.22 Thousand/µL    Eosinophils Absolute 0.29 0.00 - 0.61 Thousand/µL    Basophils Absolute 0.07 0.00 - 0.10 Thousands/µL   Result Value Ref Range    Magnesium 2.1 1.9 - 2.7 mg/dL   Result Value Ref Range    Phosphorus 3.2 2.7 - 4.5 mg/dL   Protime-INR   Result Value Ref Range    Protime 15.5 (H) 12.3 - 15.0 seconds    INR 1.21 (H) 0.85 - 1.19   APTT   Result Value Ref Range    PTT 37 (H) 23 - 34 seconds   Basic metabolic panel   Result Value Ref Range    Sodium 135 135 - 147 mmol/L    Potassium 4.8 3.5 - 5.3 mmol/L    Chloride 107 96 - 108 mmol/L    CO2 20 (L) 21 - 32 mmol/L    ANION GAP 8  4 - 13 mmol/L    BUN 11 5 - 25 mg/dL    Creatinine 0.52 (L) 0.60 - 1.30 mg/dL    Glucose 99 65 - 140 mg/dL    Calcium 7.3 (L) 8.4 - 10.2 mg/dL    eGFR 120 ml/min/1.73sq m   CBC and differential   Result Value Ref Range    WBC 12.53 (H) 4.31 - 10.16 Thousand/uL    RBC 4.39 3.81 - 5.12 Million/uL    Hemoglobin 9.6 (L) 11.5 - 15.4 g/dL    Hematocrit 33.4 (L) 34.8 - 46.1 %    MCV 76 (L) 82 - 98 fL    MCH 21.9 (L) 26.8 - 34.3 pg    MCHC 28.7 (L) 31.4 - 37.4 g/dL    RDW 23.3 (H) 11.6 - 15.1 %    MPV 10.6 8.9 - 12.7 fL    Platelets 442 (H) 149 - 390 Thousands/uL    nRBC 0 /100 WBCs    Segmented % 83 (H) 43 - 75 %    Immature Grans % 1 0 - 2 %    Lymphocytes % 7 (L) 14 - 44 %    Monocytes % 9 4 - 12 %    Eosinophils Relative 0 0 - 6 %    Basophils Relative 0 0 - 1 %    Absolute Neutrophils 10.43 (H) 1.85 - 7.62 Thousands/µL    Absolute Immature Grans 0.08 0.00 - 0.20 Thousand/uL    Absolute Lymphocytes 0.82 0.60 - 4.47 Thousands/µL    Absolute Monocytes 1.18 0.17 - 1.22 Thousand/µL    Eosinophils Absolute 0.00 0.00 - 0.61 Thousand/µL    Basophils Absolute 0.02 0.00 - 0.10 Thousands/µL   Result Value Ref Range    Magnesium 2.1 1.9 - 2.7 mg/dL   Result Value Ref Range    Phosphorus 2.9 2.7 - 4.5 mg/dL   Basic metabolic panel   Result Value Ref Range    Sodium 137 135 - 147 mmol/L    Potassium 4.1 3.5 - 5.3 mmol/L    Chloride 107 96 - 108 mmol/L    CO2 25 21 - 32 mmol/L    ANION GAP 5 4 - 13 mmol/L    BUN 12 5 - 25 mg/dL    Creatinine 0.46 (L) 0.60 - 1.30 mg/dL    Glucose 81 65 - 140 mg/dL    Calcium 7.4 (L) 8.4 - 10.2 mg/dL    eGFR 125 ml/min/1.73sq m   CBC and differential   Result Value Ref Range    WBC 7.97 4.31 - 10.16 Thousand/uL    RBC 4.21 3.81 - 5.12 Million/uL    Hemoglobin 9.4 (L) 11.5 - 15.4 g/dL    Hematocrit 33.1 (L) 34.8 - 46.1 %    MCV 79 (L) 82 - 98 fL    MCH 22.3 (L) 26.8 - 34.3 pg    MCHC 28.4 (L) 31.4 - 37.4 g/dL    RDW 24.3 (H) 11.6 - 15.1 %    MPV 10.0 8.9 - 12.7 fL    Platelets 331 149 - 390 Thousands/uL     nRBC 0 /100 WBCs    Segmented % 72 43 - 75 %    Immature Grans % 0 0 - 2 %    Lymphocytes % 21 14 - 44 %    Monocytes % 7 4 - 12 %    Eosinophils Relative 0 0 - 6 %    Basophils Relative 0 0 - 1 %    Absolute Neutrophils 5.69 1.85 - 7.62 Thousands/µL    Absolute Immature Grans 0.02 0.00 - 0.20 Thousand/uL    Absolute Lymphocytes 1.65 0.60 - 4.47 Thousands/µL    Absolute Monocytes 0.56 0.17 - 1.22 Thousand/µL    Eosinophils Absolute 0.03 0.00 - 0.61 Thousand/µL    Basophils Absolute 0.02 0.00 - 0.10 Thousands/µL   Result Value Ref Range    Magnesium 1.9 1.9 - 2.7 mg/dL   Result Value Ref Range    Phosphorus 1.8 (L) 2.7 - 4.5 mg/dL   Basic metabolic panel   Result Value Ref Range    Sodium 137 135 - 147 mmol/L    Potassium 4.3 3.5 - 5.3 mmol/L    Chloride 106 96 - 108 mmol/L    CO2 27 21 - 32 mmol/L    ANION GAP 4 4 - 13 mmol/L    BUN 8 5 - 25 mg/dL    Creatinine 0.42 (L) 0.60 - 1.30 mg/dL    Glucose 78 65 - 140 mg/dL    Calcium 7.9 (L) 8.4 - 10.2 mg/dL    eGFR 129 ml/min/1.73sq m   CBC and differential   Result Value Ref Range    WBC 8.68 4.31 - 10.16 Thousand/uL    RBC 4.83 3.81 - 5.12 Million/uL    Hemoglobin 10.9 (L) 11.5 - 15.4 g/dL    Hematocrit 36.6 34.8 - 46.1 %    MCV 76 (L) 82 - 98 fL    MCH 22.6 (L) 26.8 - 34.3 pg    MCHC 29.8 (L) 31.4 - 37.4 g/dL    RDW 25.0 (H) 11.6 - 15.1 %    MPV 9.4 8.9 - 12.7 fL    Platelets 381 149 - 390 Thousands/uL    nRBC 0 /100 WBCs    Segmented % 78 (H) 43 - 75 %    Immature Grans % 0 0 - 2 %    Lymphocytes % 12 (L) 14 - 44 %    Monocytes % 7 4 - 12 %    Eosinophils Relative 2 0 - 6 %    Basophils Relative 1 0 - 1 %    Absolute Neutrophils 6.83 1.85 - 7.62 Thousands/µL    Absolute Immature Grans 0.02 0.00 - 0.20 Thousand/uL    Absolute Lymphocytes 1.07 0.60 - 4.47 Thousands/µL    Absolute Monocytes 0.56 0.17 - 1.22 Thousand/µL    Eosinophils Absolute 0.15 0.00 - 0.61 Thousand/µL    Basophils Absolute 0.05 0.00 - 0.10 Thousands/µL   Result Value Ref Range    Magnesium 2.0 1.9  - 2.7 mg/dL   Result Value Ref Range    Phosphorus 2.0 (L) 2.7 - 4.5 mg/dL   Basic metabolic panel   Result Value Ref Range    Sodium 141 135 - 147 mmol/L    Potassium 3.7 3.5 - 5.3 mmol/L    Chloride 107 96 - 108 mmol/L    CO2 26 21 - 32 mmol/L    ANION GAP 8 4 - 13 mmol/L    BUN 6 5 - 25 mg/dL    Creatinine 0.35 (L) 0.60 - 1.30 mg/dL    Glucose 75 65 - 140 mg/dL    Calcium 7.8 (L) 8.4 - 10.2 mg/dL    eGFR 137 ml/min/1.73sq m   Result Value Ref Range    Magnesium 2.0 1.9 - 2.7 mg/dL   Result Value Ref Range    Phosphorus 2.5 (L) 2.7 - 4.5 mg/dL   CBC and Platelet   Result Value Ref Range    WBC 7.08 4.31 - 10.16 Thousand/uL    RBC 4.75 3.81 - 5.12 Million/uL    Hemoglobin 10.6 (L) 11.5 - 15.4 g/dL    Hematocrit 36.5 34.8 - 46.1 %    MCV 77 (L) 82 - 98 fL    MCH 22.3 (L) 26.8 - 34.3 pg    MCHC 29.0 (L) 31.4 - 37.4 g/dL    RDW 25.5 (H) 11.6 - 15.1 %    Platelets 385 149 - 390 Thousands/uL    MPV 10.6 8.9 - 12.7 fL   POCT pregnancy, urine   Result Value Ref Range    EXT Preg Test, Ur Negative     Control Valid    Type and screen   Result Value Ref Range    ABO Grouping A     Rh Factor Positive     Antibody Screen Negative     Specimen Expiration Date 20241121    Prepare Leukoreduced RBC: 2 Units   Result Value Ref Range    Unit Product Code N0667X10     Unit Number [id number]     Unit ABO A     Unit RH POS     Crossmatch Compatible     Unit Dispense Status Presumed Trans     Unit Product Volume 350 mL    Unit Product Code T2020T33     Unit Number [id number]     Unit ABO A     Unit RH POS     Crossmatch Compatible     Unit Dispense Status Presumed Trans     Unit Product Volume 350 mL   Prepare Leukoreduced RBC: 4 Units   Result Value Ref Range    Unit Product Code E1787Y44     Unit Number [id number]     Unit ABO A     Unit RH POS     Crossmatch Compatible     Unit Dispense Status Return to Inv     Unit Product Volume 350 mL    Unit Product Code V6880B27     Unit Number [id number]     Unit ABO A      Unit RH POS     Crossmatch Compatible     Unit Dispense Status Return to Inv     Unit Product Volume 350 mL    Unit Product Code U0720O19     Unit Number [id number]     Unit ABO A     Unit RH POS     Crossmatch Compatible     Unit Dispense Status Return to Inv     Unit Product Volume 350 mL    Unit Product Code R6379A64     Unit Number [id number]     Unit ABO A     Unit RH POS     Crossmatch Compatible     Unit Dispense Status Presumed Trans     Unit Product Volume 350 mL   Prepare Plasma: 2 Units   Result Value Ref Range    Unit Product Code R0647F62     Unit Number [id number]     Unit ABO A     Unit RH POS     Unit Dispense Status Return to Inv     Unit Product Volume 250 ml    Unit Product Code N1302V18     Unit Number [id number]     Unit ABO A     Unit RH POS     Unit Dispense Status Return to Inv     Unit Product Volume 250 ml   Tissue Exam   Result Value Ref Range    Case Report       Surgical Pathology Report                         Case: [id number]                                  Authorizing Provider:  Jewels Angel MD           Collected:           11/20/2024 1304              Ordering Location:     [facility]      Received:            11/20/2024 [address] [place] [address] Operating Room                                                      Pathologist:           Felton Blunt MD                                                                 Specimen:    Uterus, and bilateral fallopian tubes                                                      Final Diagnosis       A. Uterus and bilateral fallopian tubes:  - Numerous pedunculated and intrauterine leiomyomas, up to 33 cm in greatest dimension.  - Non-proliferative endometrium with stromal pseudo-decidualization consistent with progesterone effect.  - Benign bilateral fallopian tubes.  - Benign cervix.       "Additional Information       All reported additional testing was performed with appropriately reactive controls.  These tests were developed and their performance characteristics determined by [facility] or appropriate performing facility, though some tests may be performed on tissues which have not been validated for performance characteristics (such as staining performed on alcohol exposed cell blocks and decalcified tissues).  Results should be interpreted with caution and in the context of the patients’ clinical condition. These tests may not be cleared or approved by the U.S. Food and Drug Administration, though the FDA has determined that such clearance or approval is not necessary. These tests are used for clinical purposes and they should not be regarded as investigational or for research. This laboratory has been approved by [facility], designated as a high-complexity laboratory and is qualified to perform these tests.  .  Interpretation performed at [facility], [address] [place] [id number]      Gross Description       A. The specimen is received in formalin, labeled with the patient's name and hospital number, and is designated \" uterus and bilateral fallopian tubes\".  It consists of a uterus with attached cervix, detached bilateral fallopian tubes.  The uterus is 8300 g, 37.5 x 33.5 x 14.5 cm overall, multiple subserosal nodular surface (20+ nodules) ranging from 0.5 x 0.4 x 0.2 cm to 8.0 x 6.5 x 4.0 cm, smooth, glistening, tan-white, and 2 pedunculated nodules measuring 33.0 x 17.0 x 14.0 cm (designated nodule #1) and 15.2 x 13.4 x 13.0 cm (designated nodule #2).  The cervix is 3.5 x 2.0 x 0.9 cm.  The exocervix is tan-white, smooth, glistening; 0.6 cm slit os is present.  The anterior aspect of the uterus is inked blue, posterior aspect of the uterus is inked black.  The specimen is bivalved to reveal a endometrial cavity measuring 11 x 7 cm with a 7.5 x 6.5 x 6.0 cm firm " gray-white submucosal nodule in the posterior endometrial cavity, 7.5 cm from cervix.  The endocervical canal measures 4.0 cm.  The cervix is radially sectioned to reveal tan-white unremarkable cut surfaces.  The uterus is serially sectioned to reveal 0.1 cm endometrium of a 7.0 cm myometrium with underlying 6.7 cm white firm, centrally calcified whorled submucosal nodule among multiple firm white whorled transmural nodules ranging from 2.0 x 1.0 x 0.7 cm to 6.0 x 5.5 x 4.1 cm.  Nodule #1 is serially sectioned to reveal friable red-tan cut surfaces with abundant red fluid containing friable soft tissue fragments and a firm white-tan outer surface.  Nodule #2 serially sectioned to reveal variegated cut surfaces with a hemorrhagic friable red fluid-filled center.  Fallopian tube #1 measures 7.7 cm in length and 1.0 cm in diameter, is purple, smooth, glistening, containing one 0.5 x 0.5 x 0.4 cm clear thin-walled peritubular cyst.  Sectioning reveals purple-tan cut surfaces and stellate lumen.  Fallopian tube #2 measures 5.2 cm in length and 0.9 cm in diameter, is purple, smooth, glistening.  Sectioning reveals tan-white cut surfaces with a stellate lumen.  Digital images were taken.  Representative sections submitted as follows:    1-2: Anterior cervix, bisected, red ink indicates superior aspect  3-4: Posterior cervix, bisected, red ink indicates superior aspect  5-6: Anterior lower uterine segment, longitudinal, bisected, red ink indicates superior aspect  7: Anterior uterine cavity, full-thickness  8-9: Representative sections of anterior endometrium myometrium junction  10-17: Representative sections of anterior nodules (14 in decal #2)  18: Posterior lower uterine segment  19: Posterior uterine cavity, full-thickness  20-21: Representative section of posterior endometrium-myometrium junction  22: Representative section of posterior endometrium with underlying submucosal nodule  23-30: Representative sections of  posterior nodules  31-33: Representative sections of nodule #1  34-36: Representative sections of nodule #2  37: Fallopian tube #1 fimbriated end, bisected  38: Representative cross-sections of fallopian tube #1  39: Fallopian tube #2 fimbriated end, bisected  40: Representative cross-sections of fallopian tube #2  41-45: Additional representative sections of nodule #1  46-50: Additional representative sections of nodule #2    Note: The estimated total formalin fixation time based upon information provided by the submitting clinician and the standard processing schedule is under 72 hours. [facility]     POCT Blood Gas (CG8+)   Result Value Ref Range    pH, Art i-STAT 7.315 (L) 7.350 - 7.450    pCO2, Art i-STAT 39.8 36.0 - 44.0 mm HG    pO2, ART i-STAT 171.0 (H) 75.0 - 129.0 mm HG    BE, i-STAT -5 (L) -2 - 3 mmol/L    HCO3, Art i-STAT 20.3 (L) 22.0 - 28.0 mmol/L    CO2, i-STAT 21 21 - 32 mmol/L    O2 Sat, i-STAT 99 (H) 60 - 85 %    SODIUM, I-STAT 138 136 - 145 mmol/l    Potassium, i-STAT 3.8 3.5 - 5.3 mmol/L    Calcium, Ionized i-STAT 1.14 1.12 - 1.32 mmol/L    Hct, i-STAT 30 (L) 34.8 - 46.1 %    Hgb, i-STAT 10.2 (L) 11.5 - 15.4 g/dl    Glucose, i-STAT 104 65 - 140 mg/dl    Specimen Type ARTERIAL

## 2025-01-14 ENCOUNTER — TELEPHONE (OUTPATIENT)
Age: 40
End: 2025-01-14

## 2025-01-14 ENCOUNTER — TELEPHONE (OUTPATIENT)
Dept: GYNECOLOGIC ONCOLOGY | Facility: CLINIC | Age: 40
End: 2025-01-14

## 2025-01-14 NOTE — TELEPHONE ENCOUNTER
Called and spoke with patient about transportation.  [facility] was canceled via dispatcher.  Told patient that per Dr. Angel we can reschedule. Patient states that she will get a ride.  Told patient to come over and dr. Angel will see her whenever she gets here.  Told Patient that I will forward this to a nurse to help and see if patient can get transportation.

## 2025-01-30 ENCOUNTER — TELEPHONE (OUTPATIENT)
Age: 40
End: 2025-01-30

## 2025-01-30 NOTE — TELEPHONE ENCOUNTER
Please reschedule LYFT from 4/7/25 to 4/21/25 for appointment with Dr. Zamora in [place] at 1020AM.

## 2025-03-31 ENCOUNTER — TELEPHONE (OUTPATIENT)
Age: 40
End: 2025-03-31

## 2025-03-31 ENCOUNTER — TELEPHONE (OUTPATIENT)
Dept: HEMATOLOGY ONCOLOGY | Facility: CLINIC | Age: 40
End: 2025-03-31

## 2025-03-31 NOTE — TELEPHONE ENCOUNTER
Patient calling in to confirm HERMELINDA has been scheduled for her appointment on 4/21/25 w/ Dr Zamora. Please confirm [phone]

## 2025-04-18 ENCOUNTER — TELEPHONE (OUTPATIENT)
Dept: HEMATOLOGY ONCOLOGY | Facility: CLINIC | Age: 40
End: 2025-04-18

## 2025-04-18 NOTE — TELEPHONE ENCOUNTER
Called/spoke with pt to inform that transportation has been in place for upcoming appt, 4/21    Pt stated she could not get blood work done due to surgery on her eye.

## 2025-04-21 ENCOUNTER — OFFICE VISIT (OUTPATIENT)
Dept: HEMATOLOGY ONCOLOGY | Facility: CLINIC | Age: 40
End: 2025-04-21
Payer: COMMERCIAL

## 2025-04-21 VITALS
BODY MASS INDEX: 33.8 KG/M2 | OXYGEN SATURATION: 98 % | HEART RATE: 82 BPM | SYSTOLIC BLOOD PRESSURE: 158 MMHG | DIASTOLIC BLOOD PRESSURE: 90 MMHG | HEIGHT: 64 IN | RESPIRATION RATE: 18 BRPM | TEMPERATURE: 97.7 F | WEIGHT: 198 LBS

## 2025-04-21 DIAGNOSIS — D50.0 IRON DEFICIENCY ANEMIA DUE TO CHRONIC BLOOD LOSS: Primary | ICD-10-CM

## 2025-04-21 PROCEDURE — 99213 OFFICE O/P EST LOW 20 MIN: CPT | Performed by: INTERNAL MEDICINE

## 2025-04-21 RX ORDER — FERROUS SULFATE 325(65) MG
1 TABLET ORAL DAILY
COMMUNITY
Start: 2025-03-28

## 2025-04-21 RX ORDER — AMLODIPINE BESYLATE 5 MG/1
1 TABLET ORAL DAILY
COMMUNITY
Start: 2025-03-28

## 2025-04-21 RX ORDER — ERGOCALCIFEROL 1.25 MG/1
CAPSULE, LIQUID FILLED ORAL
COMMUNITY
Start: 2025-03-28

## 2025-04-21 NOTE — ASSESSMENT & PLAN NOTE
39-year-old -American female with a learning disability and severe iron deficiency anemia due to chronic, sever gynecologic blood loss caused by bulky uterine fibroids and massively enlarged uterus. Her excessive vaginal bleeding during menstrual cycles is the cause of her iron deficiency anemia. On 09/30/2024, she re-initiated oral contraceptives for bleeding control prescribed by the Gyn service. Patient may require a hysterectomy to control her heavy menses which have resulted in severe iron deficiency anemia. The required transfusion of 4 units of pRBCs in late June 2024 and parenteral plus oral iron replacement therapy with moderate improvement of her severe iron deficiency anemia. She initiated oral contraceptive agents on September 30, 2024 to improve heavy menses and to lessen her severe, chronic blood loss. With OCPs, she noticed improvement of menses with less severe bleeding as compared to the past several months. CBC with differential on 10/04/2024 showed microcytic anemia with a hemoglobin of 7.7 g/dL, hematocrit of 26.7%.  On October 4, 2024, the patient received an infusion of iron sucrose (Venofer) 300 mg IV. With oral and parenteral iron supplementation, the patient had slow improvement of her severe iron deficiency anemia. On November 20, 2024, the patient underwent pelvic examination under anesthesia, exploratory laparotomy with total abdominal hysterectomy, bilateral salpingectomy. Pathology showed numerous pedunculated and intrauterine leiomyomas, up to 33 cm in greatest dimension. There was non-proliferative endometrium with stromal pseudo-decidualization consistent with progesterone effect. Benign bilateral fallopian tubes and benign cervix.  There was no evidence of malignancy.  The patient recovered well after her recent gynecologic surgery.     Interim Assessment: Ms. Lopez does not have new symptoms or complaints.  I expect resolution of iron deficiency after total abdominal  hysterectomy resulting in resolution of menorrhagia.      Plan:  CBC with differential, reticulocyte count, and serum iron profile today  No indication for transfusion of leukoreduced packed red blood cells  RTC in late October 2025 for H&P      Tatianaollie Lopez understands and agrees with my management recommendations and plan of care. I answered questions to her satisfaction.

## 2025-04-21 NOTE — PROGRESS NOTES
Name: Tatiana Lopez      : 1985      MRN: [id number]  Encounter Provider: Luz Zamora MD  Encounter Date: 2025   Encounter department: [facility] [place]  :  Assessment & Plan  Iron deficiency anemia due to chronic blood loss  39-year-old -American female with a learning disability and severe iron deficiency anemia due to chronic, sever gynecologic blood loss caused by bulky uterine fibroids and massively enlarged uterus. Her excessive vaginal bleeding during menstrual cycles is the cause of her iron deficiency anemia. On 2024, she re-initiated oral contraceptives for bleeding control prescribed by the Gyn service. Patient may require a hysterectomy to control her heavy menses which have resulted in severe iron deficiency anemia. The required transfusion of 4 units of pRBCs in late 2024 and parenteral plus oral iron replacement therapy with moderate improvement of her severe iron deficiency anemia. She initiated oral contraceptive agents on 2024 to improve heavy menses and to lessen her severe, chronic blood loss. With OCPs, she noticed improvement of menses with less severe bleeding as compared to the past several months. CBC with differential on 10/04/2024 showed microcytic anemia with a hemoglobin of 7.7 g/dL, hematocrit of 26.7%.  On 2024, the patient received an infusion of iron sucrose (Venofer) 300 mg IV. With oral and parenteral iron supplementation, the patient had slow improvement of her severe iron deficiency anemia. On 2024, the patient underwent pelvic examination under anesthesia, exploratory laparotomy with total abdominal hysterectomy, bilateral salpingectomy. Pathology showed numerous pedunculated and intrauterine leiomyomas, up to 33 cm in greatest dimension. There was non-proliferative endometrium with stromal pseudo-decidualization consistent with progesterone effect. Benign bilateral  fallopian tubes and benign cervix.  There was no evidence of malignancy.  The patient recovered well after her recent gynecologic surgery.     Interim Assessment: Ms. Lopez does not have new symptoms or complaints.  I expect resolution of iron deficiency after total abdominal hysterectomy resulting in resolution of menorrhagia.      Plan:  CBC with differential, reticulocyte count, and serum iron profile today  No indication for transfusion of leukoreduced packed red blood cells  RTC in late October 2025 for H&P      Tatiana Lopez understands and agrees with my management recommendations and plan of care. I answered questions to her satisfaction.           History of Present Illness   Chief Complaint   Patient presents with    Follow-up   39-year-old -American female with a learning disability and severe iron deficiency anemia due to chronic, sever gynecologic blood loss caused by bulky uterine fibroids and massively enlarged uterus. Her excessive vaginal bleeding during menstrual cycles is the cause of her iron deficiency anemia. On 09/30/2024, she re-initiated oral contraceptives for bleeding control prescribed by the Gyn service. Patient may require a hysterectomy to control her heavy menses which have resulted in severe iron deficiency anemia. The required transfusion of 4 units of pRBCs in late June 2024 and parenteral plus oral iron replacement therapy with moderate improvement of her severe iron deficiency anemia. She initiated oral contraceptive agents on September 30, 2024 to improve heavy menses and to lessen her severe, chronic blood loss. With OCPs, she noticed improvement of menses with less severe bleeding as compared to the past several months. CBC with differential on 10/04/2024 showed microcytic anemia with a hemoglobin of 7.7 g/dL, hematocrit of 26.7%.  On October 4, 2024, the patient received an infusion of iron sucrose (Venofer) 300 mg IV. With oral and parenteral iron supplementation,  the patient had slow improvement of her severe iron deficiency anemia.     On November 20, 2024, the patient underwent pelvic examination under anesthesia, exploratory laparotomy with total abdominal hysterectomy, bilateral salpingectomy. Pathology showed numerous pedunculated and intrauterine leiomyomas, up to 33 cm in greatest dimension. There was non-proliferative endometrium with stromal pseudo-decidualization consistent with progesterone effect. Benign bilateral fallopian tubes and benign cervix.  There was no evidence of malignancy.  The patient recovered well after her recent gynecologic surgery.     Interim History: The patient does not have new symptoms or complaints.  She denies recent history of fatigue, weakness, headache, dyspnea on exertion, or other symptoms suggestive of severe anemia.  Overall, she denies new systemic, cardiorespiratory, gastrointestinal, gynecological, genitourinary, musculoskeletal, or neurologic symptoms.       Review of Systems   Constitutional:  Negative for activity change, appetite change, chills, diaphoresis, fatigue, fever and unexpected weight change.   HENT:  Negative for congestion, dental problem, ear discharge, ear pain, facial swelling, hearing loss, mouth sores, nosebleeds, postnasal drip, rhinorrhea, sinus pain, sneezing, sore throat, tinnitus, trouble swallowing and voice change.    Eyes:  Negative for photophobia, pain, discharge, redness, itching and visual disturbance.   Respiratory:  Negative for apnea, cough, choking, chest tightness, shortness of breath, wheezing and stridor.    Cardiovascular:  Negative for chest pain, palpitations and leg swelling.   Gastrointestinal:  Negative for abdominal distention, abdominal pain, anal bleeding, blood in stool, constipation, diarrhea, nausea, rectal pain and vomiting.   Endocrine: Negative for cold intolerance and heat intolerance.   Genitourinary:  Negative for decreased urine volume, difficulty urinating, dysuria,  "enuresis, flank pain, frequency, hematuria and urgency.   Musculoskeletal:  Negative for arthralgias, back pain, gait problem, joint swelling, myalgias, neck pain and neck stiffness.   Skin:  Negative for color change, pallor, rash and wound.   Neurological:  Negative for dizziness, tremors, seizures, syncope, facial asymmetry, speech difficulty, weakness, light-headedness, numbness and headaches.   Hematological:  Negative for adenopathy. Does not bruise/bleed easily.   Psychiatric/Behavioral:  Negative for behavioral problems, confusion, dysphoric mood and sleep disturbance. The patient is not nervous/anxious.      Objective   /90 (BP Location: Left arm, Patient Position: Sitting, Cuff Size: Adult)   Pulse 82   Temp 97.7 °F (36.5 °C) (Temporal)   Resp 18   Ht 5' 4\" (1.626 m)   Wt 89.8 kg (198 lb)   SpO2 98%   BMI 33.99 kg/m²     Physical Exam  Constitutional:       Comments: Anuradha American female with no acute respiratory distress   HENT:      Head: Normocephalic and atraumatic.      Nose: Nose normal.      Mouth/Throat:      Mouth: Mucous membranes are moist.      Pharynx: Oropharynx is clear.   Eyes:      Extraocular Movements: Extraocular movements intact.      Conjunctiva/sclera: Conjunctivae normal.      Pupils: Pupils are equal, round, and reactive to light.      Comments: No scleral icterus    Neck:      Comments: No cervical, supraclavicular, axillary, epitrochlear, or inguinal lymphadenopathy.   Cardiovascular:      Rate and Rhythm: Normal rate and regular rhythm.      Pulses: Normal pulses.      Heart sounds: Normal heart sounds.   Pulmonary:      Effort: Pulmonary effort is normal.      Breath sounds: Normal breath sounds.   Abdominal:      General: Bowel sounds are normal.      Palpations: Abdomen is soft.      Comments: Abdomen soft, nontender, nonpainful.  No hepatomegaly.  No splenomegaly.  No rebound tenderness.  No lateral or shifting dullness.  Bowel sounds present, normal.  "   Musculoskeletal:         General: Normal range of motion.      Cervical back: Normal range of motion and neck supple.   Skin:     General: Skin is warm and dry.      Capillary Refill: Capillary refill takes less than 2 seconds.   Neurological:      General: No focal deficit present.      Mental Status: She is alert and oriented to person, place, and time. Mental status is at baseline.   Psychiatric:         Mood and Affect: Mood normal.         Behavior: Behavior normal.         Thought Content: Thought content normal.         Judgment: Judgment normal.         Labs: I have reviewed the following labs:  Results for orders placed or performed during the hospital encounter of 11/17/24   CBC and differential   Result Value Ref Range    WBC 5.91 4.31 - 10.16 Thousand/uL    RBC 3.99 3.81 - 5.12 Million/uL    Hemoglobin 8.0 (L) 11.5 - 15.4 g/dL    Hematocrit 28.2 (L) 34.8 - 46.1 %    MCV 71 (L) 82 - 98 fL    MCH 20.1 (L) 26.8 - 34.3 pg    MCHC 28.4 (L) 31.4 - 37.4 g/dL    RDW 21.5 (H) 11.6 - 15.1 %    MPV 9.5 8.9 - 12.7 fL    Platelets 507 (H) 149 - 390 Thousands/uL    nRBC 0 /100 WBCs    Segmented % 70 43 - 75 %    Immature Grans % 0 0 - 2 %    Lymphocytes % 19 14 - 44 %    Monocytes % 8 4 - 12 %    Eosinophils Relative 2 0 - 6 %    Basophils Relative 1 0 - 1 %    Absolute Neutrophils 4.10 1.85 - 7.62 Thousands/µL    Absolute Immature Grans 0.01 0.00 - 0.20 Thousand/uL    Absolute Lymphocytes 1.14 0.60 - 4.47 Thousands/µL    Absolute Monocytes 0.47 0.17 - 1.22 Thousand/µL    Eosinophils Absolute 0.14 0.00 - 0.61 Thousand/µL    Basophils Absolute 0.05 0.00 - 0.10 Thousands/µL   Basic metabolic panel   Result Value Ref Range    Sodium 137 135 - 147 mmol/L    Potassium 3.8 3.5 - 5.3 mmol/L    Chloride 106 96 - 108 mmol/L    CO2 25 21 - 32 mmol/L    ANION GAP 6 4 - 13 mmol/L    BUN 11 5 - 25 mg/dL    Creatinine 0.47 (L) 0.60 - 1.30 mg/dL    Glucose 91 65 - 140 mg/dL    Calcium 9.0 8.4 - 10.2 mg/dL    eGFR 124 ml/min/1.73sq  m   hCG, quantitative   Result Value Ref Range    HCG, Quant <0.6 0 - 5 mIU/mL   Result Value Ref Range    Magnesium 1.9 1.9 - 2.7 mg/dL   Result Value Ref Range    Phosphorus 3.1 2.7 - 4.5 mg/dL   Basic metabolic panel   Result Value Ref Range    Sodium 139 135 - 147 mmol/L    Potassium 3.5 3.5 - 5.3 mmol/L    Chloride 107 96 - 108 mmol/L    CO2 24 21 - 32 mmol/L    ANION GAP 8 4 - 13 mmol/L    BUN 9 5 - 25 mg/dL    Creatinine 0.47 (L) 0.60 - 1.30 mg/dL    Glucose 82 65 - 140 mg/dL    Calcium 8.6 8.4 - 10.2 mg/dL    eGFR 124 ml/min/1.73sq m   CBC (with platelets)   Result Value Ref Range    WBC 6.24 4.31 - 10.16 Thousand/uL    RBC 3.77 (L) 3.81 - 5.12 Million/uL    Hemoglobin 7.5 (L) 11.5 - 15.4 g/dL    Hematocrit 27.1 (L) 34.8 - 46.1 %    MCV 72 (L) 82 - 98 fL    MCH 19.9 (L) 26.8 - 34.3 pg    MCHC 27.7 (L) 31.4 - 37.4 g/dL    RDW 21.2 (H) 11.6 - 15.1 %    Platelets 480 (H) 149 - 390 Thousands/uL    MPV 10.0 8.9 - 12.7 fL   Result Value Ref Range    Magnesium 1.9 1.9 - 2.7 mg/dL   Result Value Ref Range    Phosphorus 3.1 2.7 - 4.5 mg/dL   Basic metabolic panel   Result Value Ref Range    Sodium 138 135 - 147 mmol/L    Potassium 3.8 3.5 - 5.3 mmol/L    Chloride 106 96 - 108 mmol/L    CO2 23 21 - 32 mmol/L    ANION GAP 9 4 - 13 mmol/L    BUN 10 5 - 25 mg/dL    Creatinine 0.59 (L) 0.60 - 1.30 mg/dL    Glucose 83 65 - 140 mg/dL    Calcium 8.4 8.4 - 10.2 mg/dL    eGFR 115 ml/min/1.73sq m   CBC (with platelets)   Result Value Ref Range    WBC 8.64 4.31 - 10.16 Thousand/uL    RBC 4.44 3.81 - 5.12 Million/uL    Hemoglobin 9.3 (L) 11.5 - 15.4 g/dL    Hematocrit 33.0 (L) 34.8 - 46.1 %    MCV 74 (L) 82 - 98 fL    MCH 20.9 (L) 26.8 - 34.3 pg    MCHC 28.2 (L) 31.4 - 37.4 g/dL    RDW 22.2 (H) 11.6 - 15.1 %    Platelets 490 (H) 149 - 390 Thousands/uL    MPV 9.9 8.9 - 12.7 fL   Basic metabolic panel   Result Value Ref Range    Sodium 138 135 - 147 mmol/L    Potassium 3.8 3.5 - 5.3 mmol/L    Chloride 106 96 - 108 mmol/L    CO2 24  21 - 32 mmol/L    ANION GAP 8 4 - 13 mmol/L    BUN 9 5 - 25 mg/dL    Creatinine 0.50 (L) 0.60 - 1.30 mg/dL    Glucose 63 (L) 65 - 140 mg/dL    Calcium 8.3 (L) 8.4 - 10.2 mg/dL    eGFR 122 ml/min/1.73sq m   CBC and differential   Result Value Ref Range    WBC 10.09 4.31 - 10.16 Thousand/uL    RBC 4.24 3.81 - 5.12 Million/uL    Hemoglobin 9.1 (L) 11.5 - 15.4 g/dL    Hematocrit 31.7 (L) 34.8 - 46.1 %    MCV 75 (L) 82 - 98 fL    MCH 21.5 (L) 26.8 - 34.3 pg    MCHC 28.7 (L) 31.4 - 37.4 g/dL    RDW 22.2 (H) 11.6 - 15.1 %    MPV 10.0 8.9 - 12.7 fL    Platelets 447 (H) 149 - 390 Thousands/uL    nRBC 1 /100 WBCs    Segmented % 68 43 - 75 %    Immature Grans % 0 0 - 2 %    Lymphocytes % 19 14 - 44 %    Monocytes % 9 4 - 12 %    Eosinophils Relative 3 0 - 6 %    Basophils Relative 1 0 - 1 %    Absolute Neutrophils 6.93 1.85 - 7.62 Thousands/µL    Absolute Immature Grans 0.04 0.00 - 0.20 Thousand/uL    Absolute Lymphocytes 1.87 0.60 - 4.47 Thousands/µL    Absolute Monocytes 0.89 0.17 - 1.22 Thousand/µL    Eosinophils Absolute 0.29 0.00 - 0.61 Thousand/µL    Basophils Absolute 0.07 0.00 - 0.10 Thousands/µL   Result Value Ref Range    Magnesium 2.1 1.9 - 2.7 mg/dL   Result Value Ref Range    Phosphorus 3.2 2.7 - 4.5 mg/dL   Protime-INR   Result Value Ref Range    Protime 15.5 (H) 12.3 - 15.0 seconds    INR 1.21 (H) 0.85 - 1.19   APTT   Result Value Ref Range    PTT 37 (H) 23 - 34 seconds   Basic metabolic panel   Result Value Ref Range    Sodium 135 135 - 147 mmol/L    Potassium 4.8 3.5 - 5.3 mmol/L    Chloride 107 96 - 108 mmol/L    CO2 20 (L) 21 - 32 mmol/L    ANION GAP 8 4 - 13 mmol/L    BUN 11 5 - 25 mg/dL    Creatinine 0.52 (L) 0.60 - 1.30 mg/dL    Glucose 99 65 - 140 mg/dL    Calcium 7.3 (L) 8.4 - 10.2 mg/dL    eGFR 120 ml/min/1.73sq m   CBC and differential   Result Value Ref Range    WBC 12.53 (H) 4.31 - 10.16 Thousand/uL    RBC 4.39 3.81 - 5.12 Million/uL    Hemoglobin 9.6 (L) 11.5 - 15.4 g/dL    Hematocrit 33.4 (L) 34.8  - 46.1 %    MCV 76 (L) 82 - 98 fL    MCH 21.9 (L) 26.8 - 34.3 pg    MCHC 28.7 (L) 31.4 - 37.4 g/dL    RDW 23.3 (H) 11.6 - 15.1 %    MPV 10.6 8.9 - 12.7 fL    Platelets 442 (H) 149 - 390 Thousands/uL    nRBC 0 /100 WBCs    Segmented % 83 (H) 43 - 75 %    Immature Grans % 1 0 - 2 %    Lymphocytes % 7 (L) 14 - 44 %    Monocytes % 9 4 - 12 %    Eosinophils Relative 0 0 - 6 %    Basophils Relative 0 0 - 1 %    Absolute Neutrophils 10.43 (H) 1.85 - 7.62 Thousands/µL    Absolute Immature Grans 0.08 0.00 - 0.20 Thousand/uL    Absolute Lymphocytes 0.82 0.60 - 4.47 Thousands/µL    Absolute Monocytes 1.18 0.17 - 1.22 Thousand/µL    Eosinophils Absolute 0.00 0.00 - 0.61 Thousand/µL    Basophils Absolute 0.02 0.00 - 0.10 Thousands/µL   Result Value Ref Range    Magnesium 2.1 1.9 - 2.7 mg/dL   Result Value Ref Range    Phosphorus 2.9 2.7 - 4.5 mg/dL   Basic metabolic panel   Result Value Ref Range    Sodium 137 135 - 147 mmol/L    Potassium 4.1 3.5 - 5.3 mmol/L    Chloride 107 96 - 108 mmol/L    CO2 25 21 - 32 mmol/L    ANION GAP 5 4 - 13 mmol/L    BUN 12 5 - 25 mg/dL    Creatinine 0.46 (L) 0.60 - 1.30 mg/dL    Glucose 81 65 - 140 mg/dL    Calcium 7.4 (L) 8.4 - 10.2 mg/dL    eGFR 125 ml/min/1.73sq m   CBC and differential   Result Value Ref Range    WBC 7.97 4.31 - 10.16 Thousand/uL    RBC 4.21 3.81 - 5.12 Million/uL    Hemoglobin 9.4 (L) 11.5 - 15.4 g/dL    Hematocrit 33.1 (L) 34.8 - 46.1 %    MCV 79 (L) 82 - 98 fL    MCH 22.3 (L) 26.8 - 34.3 pg    MCHC 28.4 (L) 31.4 - 37.4 g/dL    RDW 24.3 (H) 11.6 - 15.1 %    MPV 10.0 8.9 - 12.7 fL    Platelets 331 149 - 390 Thousands/uL    nRBC 0 /100 WBCs    Segmented % 72 43 - 75 %    Immature Grans % 0 0 - 2 %    Lymphocytes % 21 14 - 44 %    Monocytes % 7 4 - 12 %    Eosinophils Relative 0 0 - 6 %    Basophils Relative 0 0 - 1 %    Absolute Neutrophils 5.69 1.85 - 7.62 Thousands/µL    Absolute Immature Grans 0.02 0.00 - 0.20 Thousand/uL    Absolute Lymphocytes 1.65 0.60 - 4.47  Thousands/µL    Absolute Monocytes 0.56 0.17 - 1.22 Thousand/µL    Eosinophils Absolute 0.03 0.00 - 0.61 Thousand/µL    Basophils Absolute 0.02 0.00 - 0.10 Thousands/µL   Result Value Ref Range    Magnesium 1.9 1.9 - 2.7 mg/dL   Result Value Ref Range    Phosphorus 1.8 (L) 2.7 - 4.5 mg/dL   Basic metabolic panel   Result Value Ref Range    Sodium 137 135 - 147 mmol/L    Potassium 4.3 3.5 - 5.3 mmol/L    Chloride 106 96 - 108 mmol/L    CO2 27 21 - 32 mmol/L    ANION GAP 4 4 - 13 mmol/L    BUN 8 5 - 25 mg/dL    Creatinine 0.42 (L) 0.60 - 1.30 mg/dL    Glucose 78 65 - 140 mg/dL    Calcium 7.9 (L) 8.4 - 10.2 mg/dL    eGFR 129 ml/min/1.73sq m   CBC and differential   Result Value Ref Range    WBC 8.68 4.31 - 10.16 Thousand/uL    RBC 4.83 3.81 - 5.12 Million/uL    Hemoglobin 10.9 (L) 11.5 - 15.4 g/dL    Hematocrit 36.6 34.8 - 46.1 %    MCV 76 (L) 82 - 98 fL    MCH 22.6 (L) 26.8 - 34.3 pg    MCHC 29.8 (L) 31.4 - 37.4 g/dL    RDW 25.0 (H) 11.6 - 15.1 %    MPV 9.4 8.9 - 12.7 fL    Platelets 381 149 - 390 Thousands/uL    nRBC 0 /100 WBCs    Segmented % 78 (H) 43 - 75 %    Immature Grans % 0 0 - 2 %    Lymphocytes % 12 (L) 14 - 44 %    Monocytes % 7 4 - 12 %    Eosinophils Relative 2 0 - 6 %    Basophils Relative 1 0 - 1 %    Absolute Neutrophils 6.83 1.85 - 7.62 Thousands/µL    Absolute Immature Grans 0.02 0.00 - 0.20 Thousand/uL    Absolute Lymphocytes 1.07 0.60 - 4.47 Thousands/µL    Absolute Monocytes 0.56 0.17 - 1.22 Thousand/µL    Eosinophils Absolute 0.15 0.00 - 0.61 Thousand/µL    Basophils Absolute 0.05 0.00 - 0.10 Thousands/µL   Result Value Ref Range    Magnesium 2.0 1.9 - 2.7 mg/dL   Result Value Ref Range    Phosphorus 2.0 (L) 2.7 - 4.5 mg/dL   Basic metabolic panel   Result Value Ref Range    Sodium 141 135 - 147 mmol/L    Potassium 3.7 3.5 - 5.3 mmol/L    Chloride 107 96 - 108 mmol/L    CO2 26 21 - 32 mmol/L    ANION GAP 8 4 - 13 mmol/L    BUN 6 5 - 25 mg/dL    Creatinine 0.35 (L) 0.60 - 1.30 mg/dL    Glucose 75  65 - 140 mg/dL    Calcium 7.8 (L) 8.4 - 10.2 mg/dL    eGFR 137 ml/min/1.73sq m   Result Value Ref Range    Magnesium 2.0 1.9 - 2.7 mg/dL   Result Value Ref Range    Phosphorus 2.5 (L) 2.7 - 4.5 mg/dL   CBC and Platelet   Result Value Ref Range    WBC 7.08 4.31 - 10.16 Thousand/uL    RBC 4.75 3.81 - 5.12 Million/uL    Hemoglobin 10.6 (L) 11.5 - 15.4 g/dL    Hematocrit 36.5 34.8 - 46.1 %    MCV 77 (L) 82 - 98 fL    MCH 22.3 (L) 26.8 - 34.3 pg    MCHC 29.0 (L) 31.4 - 37.4 g/dL    RDW 25.5 (H) 11.6 - 15.1 %    Platelets 385 149 - 390 Thousands/uL    MPV 10.6 8.9 - 12.7 fL   POCT pregnancy, urine   Result Value Ref Range    EXT Preg Test, Ur Negative     Control Valid    Type and screen   Result Value Ref Range    ABO Grouping A     Rh Factor Positive     Antibody Screen Negative     Specimen Expiration Date 20241121    Prepare Leukoreduced RBC: 2 Units   Result Value Ref Range    Unit Product Code K8393E93     Unit Number [id number]     Unit ABO A     Unit RH POS     Crossmatch Compatible     Unit Dispense Status Presumed Trans     Unit Product Volume 350 mL    Unit Product Code K2020K17     Unit Number [id number]     Unit ABO A     Unit RH POS     Crossmatch Compatible     Unit Dispense Status Presumed Trans     Unit Product Volume 350 mL   Prepare Leukoreduced RBC: 4 Units   Result Value Ref Range    Unit Product Code Q9167B99     Unit Number [id number]     Unit ABO A     Unit RH POS     Crossmatch Compatible     Unit Dispense Status Return to Inv     Unit Product Volume 350 mL    Unit Product Code M5713B68     Unit Number [id number]     Unit ABO A     Unit RH POS     Crossmatch Compatible     Unit Dispense Status Return to Inv     Unit Product Volume 350 mL    Unit Product Code E3670I68     Unit Number [id number]     Unit ABO A     Unit RH POS     Crossmatch Compatible     Unit Dispense Status Return to Inv     Unit Product Volume 350 mL    Unit Product Code N8203G90     Unit Number  [id number]     Unit ABO A     Unit RH POS     Crossmatch Compatible     Unit Dispense Status Presumed Trans     Unit Product Volume 350 mL   Prepare Plasma: 2 Units   Result Value Ref Range    Unit Product Code V2574G76     Unit Number [id number]     Unit ABO A     Unit RH POS     Unit Dispense Status Return to Inv     Unit Product Volume 250 ml    Unit Product Code R7466W69     Unit Number [id number]     Unit ABO A     Unit RH POS     Unit Dispense Status Return to Inv     Unit Product Volume 250 ml   Tissue Exam   Result Value Ref Range    Case Report       Surgical Pathology Report                         Case: [id number]                                  Authorizing Provider:  Jewels Angel MD           Collected:           11/20/2024 1304              Ordering Location:     [facility]      Received:            11/20/2024 1346                                     Hospital Operating Room                                                      Pathologist:           Felton Blunt MD                                                                 Specimen:    Uterus, and bilateral fallopian tubes                                                      Final Diagnosis       A. Uterus and bilateral fallopian tubes:  - Numerous pedunculated and intrauterine leiomyomas, up to 33 cm in greatest dimension.  - Non-proliferative endometrium with stromal pseudo-decidualization consistent with progesterone effect.  - Benign bilateral fallopian tubes.  - Benign cervix.      Additional Information       All reported additional testing was performed with appropriately reactive controls.  These tests were developed and their performance characteristics determined by [facility] or appropriate performing facility, though some tests may be performed on tissues which have not been validated for  "performance characteristics (such as staining performed on alcohol exposed cell blocks and decalcified tissues).  Results should be interpreted with caution and in the context of the patients’ clinical condition. These tests may not be cleared or approved by the U.S. Food and Drug Administration, though the FDA has determined that such clearance or approval is not necessary. These tests are used for clinical purposes and they should not be regarded as investigational or for research. This laboratory has been approved by CLIA 88, designated as a high-complexity laboratory and is qualified to perform these tests.  .  Interpretation performed at [facility], [address] [place] [address]      Gross Description       A. The specimen is received in formalin, labeled with the patient's name and hospital number, and is designated \" uterus and bilateral fallopian tubes\".  It consists of a uterus with attached cervix, detached bilateral fallopian tubes.  The uterus is 8300 g, 37.5 x 33.5 x 14.5 cm overall, multiple subserosal nodular surface (20+ nodules) ranging from 0.5 x 0.4 x 0.2 cm to 8.0 x 6.5 x 4.0 cm, smooth, glistening, tan-white, and 2 pedunculated nodules measuring 33.0 x 17.0 x 14.0 cm (designated nodule #1) and 15.2 x 13.4 x 13.0 cm (designated nodule #2).  The cervix is 3.5 x 2.0 x 0.9 cm.  The exocervix is tan-white, smooth, glistening; 0.6 cm slit os is present.  The anterior aspect of the uterus is inked blue, posterior aspect of the uterus is inked black.  The specimen is bivalved to reveal a endometrial cavity measuring 11 x 7 cm with a 7.5 x 6.5 x 6.0 cm firm gray-white submucosal nodule in the posterior endometrial cavity, 7.5 cm from cervix.  The endocervical canal measures 4.0 cm.  The cervix is radially sectioned to reveal tan-white unremarkable cut surfaces.  The uterus is serially sectioned to reveal 0.1 cm endometrium of a 7.0 cm myometrium with underlying 6.7 cm white firm, centrally " calcified whorled submucosal nodule among multiple firm white whorled transmural nodules ranging from 2.0 x 1.0 x 0.7 cm to 6.0 x 5.5 x 4.1 cm.  Nodule #1 is serially sectioned to reveal friable red-tan cut surfaces with abundant red fluid containing friable soft tissue fragments and a firm white-tan outer surface.  Nodule #2 serially sectioned to reveal variegated cut surfaces with a hemorrhagic friable red fluid-filled center.  Fallopian tube #1 measures 7.7 cm in length and 1.0 cm in diameter, is purple, smooth, glistening, containing one 0.5 x 0.5 x 0.4 cm clear thin-walled peritubular cyst.  Sectioning reveals purple-tan cut surfaces and stellate lumen.  Fallopian tube #2 measures 5.2 cm in length and 0.9 cm in diameter, is purple, smooth, glistening.  Sectioning reveals tan-white cut surfaces with a stellate lumen.  Digital images were taken.  Representative sections submitted as follows:    1-2: Anterior cervix, bisected, red ink indicates superior aspect  3-4: Posterior cervix, bisected, red ink indicates superior aspect  5-6: Anterior lower uterine segment, longitudinal, bisected, red ink indicates superior aspect  7: Anterior uterine cavity, full-thickness  8-9: Representative sections of anterior endometrium myometrium junction  10-17: Representative sections of anterior nodules (14 in decal #2)  18: Posterior lower uterine segment  19: Posterior uterine cavity, full-thickness  20-21: Representative section of posterior endometrium-myometrium junction  22: Representative section of posterior endometrium with underlying submucosal nodule  23-30: Representative sections of posterior nodules  31-33: Representative sections of nodule #1  34-36: Representative sections of nodule #2  37: Fallopian tube #1 fimbriated end, bisected  38: Representative cross-sections of fallopian tube #1  39: Fallopian tube #2 fimbriated end, bisected  40: Representative cross-sections of fallopian tube #2  41-45: Additional  representative sections of nodule #1  46-50: Additional representative sections of nodule #2    Note: The estimated total formalin fixation time based upon information provided by the submitting clinician and the standard processing schedule is under 72 hours. [facility]     POCT Blood Gas (CG8+)   Result Value Ref Range    pH, Art i-STAT 7.315 (L) 7.350 - 7.450    pCO2, Art i-STAT 39.8 36.0 - 44.0 mm HG    pO2, ART i-STAT 171.0 (H) 75.0 - 129.0 mm HG    BE, i-STAT -5 (L) -2 - 3 mmol/L    HCO3, Art i-STAT 20.3 (L) 22.0 - 28.0 mmol/L    CO2, i-STAT 21 21 - 32 mmol/L    O2 Sat, i-STAT 99 (H) 60 - 85 %    SODIUM, I-STAT 138 136 - 145 mmol/l    Potassium, i-STAT 3.8 3.5 - 5.3 mmol/L    Calcium, Ionized i-STAT 1.14 1.12 - 1.32 mmol/L    Hct, i-STAT 30 (L) 34.8 - 46.1 %    Hgb, i-STAT 10.2 (L) 11.5 - 15.4 g/dl    Glucose, i-STAT 104 65 - 140 mg/dl    Specimen Type ARTERIAL

## (undated) DEVICE — SUT PDS II 1 XLH 96 IN LOOPED Z881G

## (undated) DEVICE — GLOVE SRG LF STRL BGL SKNSNS 6.5 PF

## (undated) DEVICE — GLOVE SRG BIOGEL 6.5

## (undated) DEVICE — MEDI-VAC YANK SUCT HNDL W/TPRD BULBOUS TIP: Brand: CARDINAL HEALTH

## (undated) DEVICE — ELECTRODE BLADE MOD  E-Z CLEAN 6.5IN -0014M

## (undated) DEVICE — PREMIUM DRY TRAY LF: Brand: MEDLINE INDUSTRIES, INC.

## (undated) DEVICE — SUT VICRYL 2-0 CT-1 27 IN J259H

## (undated) DEVICE — PENCIL ELECTROSURG E-Z CLEAN -0035H

## (undated) DEVICE — SUT VICRYL 0 REEL 54 IN J287G

## (undated) DEVICE — ENSEAL 20 CM SHAFT, LARGE JAW: Brand: ENSEAL X1

## (undated) DEVICE — TRAY FOLEY 16FR URIMETER SILICONE SURESTEP

## (undated) DEVICE — TOWEL SET X-RAY

## (undated) DEVICE — EXOFIN PRECISION PEN HIGH VISCOSITY TOPICAL SKIN ADHESIVE: Brand: EXOFIN PRECISION PEN, 1G

## (undated) DEVICE — CHLORHEXIDINE 4PCT 4 OZ

## (undated) DEVICE — PACK PBDS STERILE LAP LITHOTOMY RF

## (undated) DEVICE — GLOVE INDICATOR PI UNDERGLOVE SZ 6.5 BLUE

## (undated) DEVICE — SUT STRATAFIX SPIRAL PLUS 3-0 PS-2 30 X 30 CM SXMP2B408

## (undated) DEVICE — SUT VICRYL 0 CT-1 CR/8 27 IN JJ41G

## (undated) DEVICE — ANTIBACTERIAL UNDYED BRAIDED (POLYGLACTIN 910), SYNTHETIC ABSORBABLE SUTURE: Brand: COATED VICRYL

## (undated) DEVICE — INTENDED FOR TISSUE SEPARATION, AND OTHER PROCEDURES THAT REQUIRE A SHARP SURGICAL BLADE TO PUNCTURE OR CUT.: Brand: BARD-PARKER SAFETY BLADES SIZE 15, STERILE

## (undated) DEVICE — TELFA ADHESIVE ISLAND DRESSING: Brand: TELFA